# Patient Record
Sex: FEMALE | Race: WHITE | NOT HISPANIC OR LATINO | URBAN - METROPOLITAN AREA
[De-identification: names, ages, dates, MRNs, and addresses within clinical notes are randomized per-mention and may not be internally consistent; named-entity substitution may affect disease eponyms.]

---

## 2017-08-23 ENCOUNTER — OUTPATIENT (OUTPATIENT)
Dept: OUTPATIENT SERVICES | Facility: HOSPITAL | Age: 77
LOS: 1 days | Discharge: HOME | End: 2017-08-23

## 2017-08-23 DIAGNOSIS — H40.1120 PRIMARY OPEN-ANGLE GLAUCOMA, LEFT EYE, STAGE UNSPECIFIED: ICD-10-CM

## 2017-08-23 DIAGNOSIS — I10 ESSENTIAL (PRIMARY) HYPERTENSION: ICD-10-CM

## 2017-08-23 DIAGNOSIS — Z88.8 ALLERGY STATUS TO OTHER DRUGS, MEDICAMENTS AND BIOLOGICAL SUBSTANCES: ICD-10-CM

## 2017-08-23 DIAGNOSIS — H26.9 UNSPECIFIED CATARACT: ICD-10-CM

## 2017-08-23 DIAGNOSIS — E78.00 PURE HYPERCHOLESTEROLEMIA, UNSPECIFIED: ICD-10-CM

## 2017-08-23 DIAGNOSIS — Z88.0 ALLERGY STATUS TO PENICILLIN: ICD-10-CM

## 2018-06-14 PROBLEM — Z00.00 ENCOUNTER FOR PREVENTIVE HEALTH EXAMINATION: Status: ACTIVE | Noted: 2018-06-14

## 2018-06-22 ENCOUNTER — EMERGENCY (EMERGENCY)
Facility: HOSPITAL | Age: 78
LOS: 0 days | Discharge: HOME | End: 2018-06-22
Attending: EMERGENCY MEDICINE | Admitting: EMERGENCY MEDICINE

## 2018-06-22 VITALS
HEART RATE: 61 BPM | DIASTOLIC BLOOD PRESSURE: 73 MMHG | WEIGHT: 175.05 LBS | RESPIRATION RATE: 20 BRPM | SYSTOLIC BLOOD PRESSURE: 172 MMHG | OXYGEN SATURATION: 97 % | TEMPERATURE: 97 F

## 2018-06-22 DIAGNOSIS — M54.41 LUMBAGO WITH SCIATICA, RIGHT SIDE: ICD-10-CM

## 2018-06-22 DIAGNOSIS — E03.9 HYPOTHYROIDISM, UNSPECIFIED: ICD-10-CM

## 2018-06-22 DIAGNOSIS — Z95.1 PRESENCE OF AORTOCORONARY BYPASS GRAFT: ICD-10-CM

## 2018-06-22 DIAGNOSIS — Z91.018 ALLERGY TO OTHER FOODS: ICD-10-CM

## 2018-06-22 DIAGNOSIS — Z88.1 ALLERGY STATUS TO OTHER ANTIBIOTIC AGENTS STATUS: ICD-10-CM

## 2018-06-22 DIAGNOSIS — I25.10 ATHEROSCLEROTIC HEART DISEASE OF NATIVE CORONARY ARTERY WITHOUT ANGINA PECTORIS: ICD-10-CM

## 2018-06-22 DIAGNOSIS — M25.551 PAIN IN RIGHT HIP: ICD-10-CM

## 2018-06-22 RX ORDER — DEXAMETHASONE 0.5 MG/5ML
10 ELIXIR ORAL ONCE
Qty: 0 | Refills: 0 | Status: COMPLETED | OUTPATIENT
Start: 2018-06-22 | End: 2018-06-22

## 2018-06-22 RX ORDER — IBUPROFEN 200 MG
600 TABLET ORAL ONCE
Qty: 0 | Refills: 0 | Status: COMPLETED | OUTPATIENT
Start: 2018-06-22 | End: 2018-06-22

## 2018-06-22 RX ADMIN — Medication 600 MILLIGRAM(S): at 16:37

## 2018-06-22 RX ADMIN — Medication 10 MILLIGRAM(S): at 18:26

## 2018-06-22 NOTE — ED PROVIDER NOTE - OBJECTIVE STATEMENT
right low back pain radiating to right done right leg, Started 1 week ago while in exercise class, Felt sudden pain, With to chiropractor with some temporary relief. pain now getting  worse , radiating done leg,

## 2018-07-03 ENCOUNTER — APPOINTMENT (OUTPATIENT)
Dept: VASCULAR SURGERY | Facility: CLINIC | Age: 78
End: 2018-07-03
Payer: COMMERCIAL

## 2018-07-03 ENCOUNTER — OUTPATIENT (OUTPATIENT)
Dept: OUTPATIENT SERVICES | Facility: HOSPITAL | Age: 78
LOS: 1 days | Discharge: HOME | End: 2018-07-03

## 2018-07-03 VITALS
SYSTOLIC BLOOD PRESSURE: 120 MMHG | HEIGHT: 61 IN | DIASTOLIC BLOOD PRESSURE: 70 MMHG | BODY MASS INDEX: 30.21 KG/M2 | WEIGHT: 160 LBS

## 2018-07-03 DIAGNOSIS — Z86.39 PERSONAL HISTORY OF OTHER ENDOCRINE, NUTRITIONAL AND METABOLIC DISEASE: ICD-10-CM

## 2018-07-03 DIAGNOSIS — I65.23 OCCLUSION AND STENOSIS OF BILATERAL CAROTID ARTERIES: ICD-10-CM

## 2018-07-03 DIAGNOSIS — Z86.79 PERSONAL HISTORY OF OTHER DISEASES OF THE CIRCULATORY SYSTEM: ICD-10-CM

## 2018-07-03 PROCEDURE — 93880 EXTRACRANIAL BILAT STUDY: CPT

## 2018-07-03 PROCEDURE — 99203 OFFICE O/P NEW LOW 30 MIN: CPT

## 2018-07-15 ENCOUNTER — FORM ENCOUNTER (OUTPATIENT)
Age: 78
End: 2018-07-15

## 2018-07-16 ENCOUNTER — OTHER (OUTPATIENT)
Age: 78
End: 2018-07-16

## 2018-07-16 ENCOUNTER — OUTPATIENT (OUTPATIENT)
Dept: OUTPATIENT SERVICES | Facility: HOSPITAL | Age: 78
LOS: 1 days | Discharge: HOME | End: 2018-07-16

## 2018-07-16 DIAGNOSIS — I65.23 OCCLUSION AND STENOSIS OF BILATERAL CAROTID ARTERIES: ICD-10-CM

## 2018-07-16 LAB
BUN SERPL-MCNC: 20 MG/DL
CREAT SERPL-MCNC: 1 MG/DL

## 2018-07-18 PROBLEM — I25.10 ATHEROSCLEROTIC HEART DISEASE OF NATIVE CORONARY ARTERY WITHOUT ANGINA PECTORIS: Chronic | Status: ACTIVE | Noted: 2018-06-22

## 2018-07-21 ENCOUNTER — OUTPATIENT (OUTPATIENT)
Dept: OUTPATIENT SERVICES | Facility: HOSPITAL | Age: 78
LOS: 1 days | Discharge: HOME | End: 2018-07-21

## 2018-07-21 DIAGNOSIS — M54.31 SCIATICA, RIGHT SIDE: ICD-10-CM

## 2018-07-24 ENCOUNTER — APPOINTMENT (OUTPATIENT)
Dept: VASCULAR SURGERY | Facility: CLINIC | Age: 78
End: 2018-07-24
Payer: COMMERCIAL

## 2018-07-24 PROCEDURE — 99213 OFFICE O/P EST LOW 20 MIN: CPT

## 2018-07-31 ENCOUNTER — FORM ENCOUNTER (OUTPATIENT)
Age: 78
End: 2018-07-31

## 2018-08-01 ENCOUNTER — OUTPATIENT (OUTPATIENT)
Dept: OUTPATIENT SERVICES | Facility: HOSPITAL | Age: 78
LOS: 1 days | Discharge: HOME | End: 2018-08-01

## 2018-08-01 VITALS
HEIGHT: 61 IN | OXYGEN SATURATION: 98 % | DIASTOLIC BLOOD PRESSURE: 80 MMHG | SYSTOLIC BLOOD PRESSURE: 160 MMHG | RESPIRATION RATE: 20 BRPM | TEMPERATURE: 98 F | HEART RATE: 60 BPM | WEIGHT: 170.2 LBS

## 2018-08-01 DIAGNOSIS — I25.810 ATHEROSCLEROSIS OF CORONARY ARTERY BYPASS GRAFT(S) WITHOUT ANGINA PECTORIS: Chronic | ICD-10-CM

## 2018-08-01 DIAGNOSIS — Z96.641 PRESENCE OF RIGHT ARTIFICIAL HIP JOINT: Chronic | ICD-10-CM

## 2018-08-01 DIAGNOSIS — Z90.49 ACQUIRED ABSENCE OF OTHER SPECIFIED PARTS OF DIGESTIVE TRACT: Chronic | ICD-10-CM

## 2018-08-01 DIAGNOSIS — Z01.818 ENCOUNTER FOR OTHER PREPROCEDURAL EXAMINATION: ICD-10-CM

## 2018-08-01 DIAGNOSIS — I65.23 OCCLUSION AND STENOSIS OF BILATERAL CAROTID ARTERIES: ICD-10-CM

## 2018-08-01 DIAGNOSIS — E89.0 POSTPROCEDURAL HYPOTHYROIDISM: Chronic | ICD-10-CM

## 2018-08-01 LAB
ALBUMIN SERPL ELPH-MCNC: 4.5 G/DL — SIGNIFICANT CHANGE UP (ref 3.5–5.2)
ALP SERPL-CCNC: 69 U/L — SIGNIFICANT CHANGE UP (ref 30–115)
ALT FLD-CCNC: 13 U/L — SIGNIFICANT CHANGE UP (ref 0–41)
ANION GAP SERPL CALC-SCNC: 16 MMOL/L — HIGH (ref 7–14)
APPEARANCE UR: CLEAR — SIGNIFICANT CHANGE UP
APTT BLD: 34.2 SEC — SIGNIFICANT CHANGE UP (ref 27–39.2)
AST SERPL-CCNC: 18 U/L — SIGNIFICANT CHANGE UP (ref 0–41)
BASOPHILS # BLD AUTO: 0.06 K/UL — SIGNIFICANT CHANGE UP (ref 0–0.2)
BASOPHILS NFR BLD AUTO: 0.6 % — SIGNIFICANT CHANGE UP (ref 0–1)
BILIRUB SERPL-MCNC: 0.3 MG/DL — SIGNIFICANT CHANGE UP (ref 0.2–1.2)
BILIRUB UR-MCNC: NEGATIVE — SIGNIFICANT CHANGE UP
BLD GP AB SCN SERPL QL: SIGNIFICANT CHANGE UP
BUN SERPL-MCNC: 27 MG/DL — HIGH (ref 10–20)
CALCIUM SERPL-MCNC: 9.7 MG/DL — SIGNIFICANT CHANGE UP (ref 8.5–10.1)
CHLORIDE SERPL-SCNC: 98 MMOL/L — SIGNIFICANT CHANGE UP (ref 98–110)
CO2 SERPL-SCNC: 28 MMOL/L — SIGNIFICANT CHANGE UP (ref 17–32)
COLOR SPEC: YELLOW — SIGNIFICANT CHANGE UP
CREAT SERPL-MCNC: 1.1 MG/DL — SIGNIFICANT CHANGE UP (ref 0.7–1.5)
DIFF PNL FLD: ABNORMAL
EOSINOPHIL # BLD AUTO: 0.2 K/UL — SIGNIFICANT CHANGE UP (ref 0–0.7)
EOSINOPHIL NFR BLD AUTO: 2.1 % — SIGNIFICANT CHANGE UP (ref 0–8)
GLUCOSE SERPL-MCNC: 85 MG/DL — SIGNIFICANT CHANGE UP (ref 70–99)
GLUCOSE UR QL: NEGATIVE MG/DL — SIGNIFICANT CHANGE UP
HCT VFR BLD CALC: 35.6 % — LOW (ref 37–47)
HGB BLD-MCNC: 12 G/DL — SIGNIFICANT CHANGE UP (ref 12–16)
IMM GRANULOCYTES NFR BLD AUTO: 0.2 % — SIGNIFICANT CHANGE UP (ref 0.1–0.3)
INR BLD: 0.96 RATIO — SIGNIFICANT CHANGE UP (ref 0.65–1.3)
KETONES UR-MCNC: NEGATIVE — SIGNIFICANT CHANGE UP
LEUKOCYTE ESTERASE UR-ACNC: NEGATIVE — SIGNIFICANT CHANGE UP
LYMPHOCYTES # BLD AUTO: 2.81 K/UL — SIGNIFICANT CHANGE UP (ref 1.2–3.4)
LYMPHOCYTES # BLD AUTO: 29.6 % — SIGNIFICANT CHANGE UP (ref 20.5–51.1)
MCHC RBC-ENTMCNC: 29.9 PG — SIGNIFICANT CHANGE UP (ref 27–31)
MCHC RBC-ENTMCNC: 33.7 G/DL — SIGNIFICANT CHANGE UP (ref 32–37)
MCV RBC AUTO: 88.8 FL — SIGNIFICANT CHANGE UP (ref 81–99)
MONOCYTES # BLD AUTO: 0.56 K/UL — SIGNIFICANT CHANGE UP (ref 0.1–0.6)
MONOCYTES NFR BLD AUTO: 5.9 % — SIGNIFICANT CHANGE UP (ref 1.7–9.3)
NEUTROPHILS # BLD AUTO: 5.84 K/UL — SIGNIFICANT CHANGE UP (ref 1.4–6.5)
NEUTROPHILS NFR BLD AUTO: 61.6 % — SIGNIFICANT CHANGE UP (ref 42.2–75.2)
NITRITE UR-MCNC: NEGATIVE — SIGNIFICANT CHANGE UP
NRBC # BLD: 0 /100 WBCS — SIGNIFICANT CHANGE UP (ref 0–0)
PH UR: 6 — SIGNIFICANT CHANGE UP (ref 5–8)
PLATELET # BLD AUTO: 249 K/UL — SIGNIFICANT CHANGE UP (ref 130–400)
POTASSIUM SERPL-MCNC: 4.3 MMOL/L — SIGNIFICANT CHANGE UP (ref 3.5–5)
POTASSIUM SERPL-SCNC: 4.3 MMOL/L — SIGNIFICANT CHANGE UP (ref 3.5–5)
PROT SERPL-MCNC: 7 G/DL — SIGNIFICANT CHANGE UP (ref 6–8)
PROT UR-MCNC: NEGATIVE MG/DL — SIGNIFICANT CHANGE UP
PROTHROM AB SERPL-ACNC: 10.4 SEC — SIGNIFICANT CHANGE UP (ref 9.95–12.87)
RBC # BLD: 4.01 M/UL — LOW (ref 4.2–5.4)
RBC # FLD: 12.9 % — SIGNIFICANT CHANGE UP (ref 11.5–14.5)
SODIUM SERPL-SCNC: 142 MMOL/L — SIGNIFICANT CHANGE UP (ref 135–146)
SP GR SPEC: 1.02 — SIGNIFICANT CHANGE UP (ref 1.01–1.03)
TYPE + AB SCN PNL BLD: SIGNIFICANT CHANGE UP
UROBILINOGEN FLD QL: 0.2 MG/DL — SIGNIFICANT CHANGE UP (ref 0.2–0.2)
WBC # BLD: 9.49 K/UL — SIGNIFICANT CHANGE UP (ref 4.8–10.8)
WBC # FLD AUTO: 9.49 K/UL — SIGNIFICANT CHANGE UP (ref 4.8–10.8)
WBC UR QL: SIGNIFICANT CHANGE UP /HPF

## 2018-08-01 NOTE — H&P PST ADULT - PMH
CAD (coronary artery disease)    CAD (coronary artery disease) of bypass graft    Glaucoma    HTN (hypertension)    Hypothyroid CAD (coronary artery disease)    CAD (coronary artery disease) of bypass graft    Glaucoma    HTN (hypertension)    Hypothyroid    Macular degeneration

## 2018-08-01 NOTE — H&P PST ADULT - FAMILY HISTORY
Father  Still living? No  CAD (coronary artery disease) of bypass graft, Age at diagnosis: Age Unknown

## 2018-08-01 NOTE — H&P PST ADULT - PSH
CAD (coronary artery disease) of bypass graft  3v avr  History of hip replacement, total, right    S/P cholecystectomy    S/P partial thyroidectomy

## 2018-08-01 NOTE — H&P PST ADULT - HISTORY OF PRESENT ILLNESS
79 y/o female with h/o carotid stenosis scheduled for right cea with patch  reports no c/o cp,sob,palpitations,cough or dysuria  1-2 fos without sob

## 2018-08-06 ENCOUNTER — INPATIENT (INPATIENT)
Facility: HOSPITAL | Age: 78
LOS: 1 days | Discharge: HOME | End: 2018-08-08
Attending: SURGERY | Admitting: SURGERY
Payer: COMMERCIAL

## 2018-08-06 ENCOUNTER — APPOINTMENT (OUTPATIENT)
Dept: VASCULAR SURGERY | Facility: HOSPITAL | Age: 78
End: 2018-08-06
Payer: COMMERCIAL

## 2018-08-06 ENCOUNTER — RESULT REVIEW (OUTPATIENT)
Age: 78
End: 2018-08-06

## 2018-08-06 VITALS
HEIGHT: 61 IN | TEMPERATURE: 98 F | HEART RATE: 64 BPM | RESPIRATION RATE: 18 BRPM | WEIGHT: 160.94 LBS | DIASTOLIC BLOOD PRESSURE: 75 MMHG | SYSTOLIC BLOOD PRESSURE: 145 MMHG

## 2018-08-06 DIAGNOSIS — Z90.49 ACQUIRED ABSENCE OF OTHER SPECIFIED PARTS OF DIGESTIVE TRACT: Chronic | ICD-10-CM

## 2018-08-06 DIAGNOSIS — I25.810 ATHEROSCLEROSIS OF CORONARY ARTERY BYPASS GRAFT(S) WITHOUT ANGINA PECTORIS: Chronic | ICD-10-CM

## 2018-08-06 DIAGNOSIS — E89.0 POSTPROCEDURAL HYPOTHYROIDISM: Chronic | ICD-10-CM

## 2018-08-06 DIAGNOSIS — Z96.641 PRESENCE OF RIGHT ARTIFICIAL HIP JOINT: Chronic | ICD-10-CM

## 2018-08-06 DIAGNOSIS — I97.638 POSTPROCEDURAL HEMATOMA OF A CIRCULATORY SYSTEM ORGAN OR STRUCTURE FOLLOWING OTHER CIRCULATORY SYSTEM PROCEDURE: ICD-10-CM

## 2018-08-06 LAB
ANION GAP SERPL CALC-SCNC: 12 MMOL/L — SIGNIFICANT CHANGE UP (ref 7–14)
ANION GAP SERPL CALC-SCNC: 16 MMOL/L — HIGH (ref 7–14)
APTT BLD: >200 SEC — CRITICAL HIGH (ref 27–39.2)
BASOPHILS # BLD AUTO: 0.03 K/UL — SIGNIFICANT CHANGE UP (ref 0–0.2)
BASOPHILS NFR BLD AUTO: 0.4 % — SIGNIFICANT CHANGE UP (ref 0–1)
BUN SERPL-MCNC: 19 MG/DL — SIGNIFICANT CHANGE UP (ref 10–20)
BUN SERPL-MCNC: 20 MG/DL — SIGNIFICANT CHANGE UP (ref 10–20)
CALCIUM SERPL-MCNC: 8.4 MG/DL — LOW (ref 8.5–10.1)
CALCIUM SERPL-MCNC: 8.5 MG/DL — SIGNIFICANT CHANGE UP (ref 8.5–10.1)
CHLORIDE SERPL-SCNC: 102 MMOL/L — SIGNIFICANT CHANGE UP (ref 98–110)
CHLORIDE SERPL-SCNC: 97 MMOL/L — LOW (ref 98–110)
CK SERPL-CCNC: 74 U/L — SIGNIFICANT CHANGE UP (ref 0–225)
CO2 SERPL-SCNC: 25 MMOL/L — SIGNIFICANT CHANGE UP (ref 17–32)
CO2 SERPL-SCNC: 25 MMOL/L — SIGNIFICANT CHANGE UP (ref 17–32)
CREAT SERPL-MCNC: 0.9 MG/DL — SIGNIFICANT CHANGE UP (ref 0.7–1.5)
CREAT SERPL-MCNC: 0.9 MG/DL — SIGNIFICANT CHANGE UP (ref 0.7–1.5)
EOSINOPHIL # BLD AUTO: 0.2 K/UL — SIGNIFICANT CHANGE UP (ref 0–0.7)
EOSINOPHIL NFR BLD AUTO: 2.8 % — SIGNIFICANT CHANGE UP (ref 0–8)
GLUCOSE SERPL-MCNC: 113 MG/DL — HIGH (ref 70–99)
GLUCOSE SERPL-MCNC: 98 MG/DL — SIGNIFICANT CHANGE UP (ref 70–99)
HCT VFR BLD CALC: 29.9 % — LOW (ref 37–47)
HCT VFR BLD CALC: 30.2 % — LOW (ref 37–47)
HGB BLD-MCNC: 10.4 G/DL — LOW (ref 12–16)
HGB BLD-MCNC: 10.4 G/DL — LOW (ref 12–16)
IMM GRANULOCYTES NFR BLD AUTO: 0.3 % — SIGNIFICANT CHANGE UP (ref 0.1–0.3)
INR BLD: 1.15 RATIO — SIGNIFICANT CHANGE UP (ref 0.65–1.3)
LYMPHOCYTES # BLD AUTO: 2.73 K/UL — SIGNIFICANT CHANGE UP (ref 1.2–3.4)
LYMPHOCYTES # BLD AUTO: 37.6 % — SIGNIFICANT CHANGE UP (ref 20.5–51.1)
MAGNESIUM SERPL-MCNC: 1.9 MG/DL — SIGNIFICANT CHANGE UP (ref 1.8–2.4)
MAGNESIUM SERPL-MCNC: 2 MG/DL — SIGNIFICANT CHANGE UP (ref 1.8–2.4)
MCHC RBC-ENTMCNC: 30.2 PG — SIGNIFICANT CHANGE UP (ref 27–31)
MCHC RBC-ENTMCNC: 30.3 PG — SIGNIFICANT CHANGE UP (ref 27–31)
MCHC RBC-ENTMCNC: 34.4 G/DL — SIGNIFICANT CHANGE UP (ref 32–37)
MCHC RBC-ENTMCNC: 34.8 G/DL — SIGNIFICANT CHANGE UP (ref 32–37)
MCV RBC AUTO: 87.2 FL — SIGNIFICANT CHANGE UP (ref 81–99)
MCV RBC AUTO: 87.8 FL — SIGNIFICANT CHANGE UP (ref 81–99)
MONOCYTES # BLD AUTO: 0.44 K/UL — SIGNIFICANT CHANGE UP (ref 0.1–0.6)
MONOCYTES NFR BLD AUTO: 6.1 % — SIGNIFICANT CHANGE UP (ref 1.7–9.3)
NEUTROPHILS # BLD AUTO: 3.84 K/UL — SIGNIFICANT CHANGE UP (ref 1.4–6.5)
NEUTROPHILS NFR BLD AUTO: 52.8 % — SIGNIFICANT CHANGE UP (ref 42.2–75.2)
NRBC # BLD: 0 /100 WBCS — SIGNIFICANT CHANGE UP (ref 0–0)
PLATELET # BLD AUTO: 193 K/UL — SIGNIFICANT CHANGE UP (ref 130–400)
PLATELET # BLD AUTO: 199 K/UL — SIGNIFICANT CHANGE UP (ref 130–400)
POTASSIUM SERPL-MCNC: 3.4 MMOL/L — LOW (ref 3.5–5)
POTASSIUM SERPL-MCNC: 4.3 MMOL/L — SIGNIFICANT CHANGE UP (ref 3.5–5)
POTASSIUM SERPL-SCNC: 3.4 MMOL/L — LOW (ref 3.5–5)
POTASSIUM SERPL-SCNC: 4.3 MMOL/L — SIGNIFICANT CHANGE UP (ref 3.5–5)
PROTHROM AB SERPL-ACNC: 12.4 SEC — SIGNIFICANT CHANGE UP (ref 9.95–12.87)
RBC # BLD: 3.43 M/UL — LOW (ref 4.2–5.4)
RBC # BLD: 3.44 M/UL — LOW (ref 4.2–5.4)
RBC # FLD: 13.2 % — SIGNIFICANT CHANGE UP (ref 11.5–14.5)
RBC # FLD: 13.3 % — SIGNIFICANT CHANGE UP (ref 11.5–14.5)
SODIUM SERPL-SCNC: 138 MMOL/L — SIGNIFICANT CHANGE UP (ref 135–146)
SODIUM SERPL-SCNC: 139 MMOL/L — SIGNIFICANT CHANGE UP (ref 135–146)
TROPONIN T SERPL-MCNC: <0.01 NG/ML — SIGNIFICANT CHANGE UP
WBC # BLD: 10.22 K/UL — SIGNIFICANT CHANGE UP (ref 4.8–10.8)
WBC # BLD: 7.26 K/UL — SIGNIFICANT CHANGE UP (ref 4.8–10.8)
WBC # FLD AUTO: 10.22 K/UL — SIGNIFICANT CHANGE UP (ref 4.8–10.8)
WBC # FLD AUTO: 7.26 K/UL — SIGNIFICANT CHANGE UP (ref 4.8–10.8)

## 2018-08-06 PROCEDURE — 99233 SBSQ HOSP IP/OBS HIGH 50: CPT

## 2018-08-06 PROCEDURE — 35301 RECHANNELING OF ARTERY: CPT | Mod: RT

## 2018-08-06 PROCEDURE — 35800 EXPLORE NECK VESSELS: CPT | Mod: 78,RT

## 2018-08-06 RX ORDER — LOSARTAN POTASSIUM 100 MG/1
100 TABLET, FILM COATED ORAL DAILY
Qty: 0 | Refills: 0 | Status: DISCONTINUED | OUTPATIENT
Start: 2018-08-06 | End: 2018-08-06

## 2018-08-06 RX ORDER — ATORVASTATIN CALCIUM 80 MG/1
80 TABLET, FILM COATED ORAL AT BEDTIME
Qty: 0 | Refills: 0 | Status: DISCONTINUED | OUTPATIENT
Start: 2018-08-06 | End: 2018-08-06

## 2018-08-06 RX ORDER — BRIMONIDINE TARTRATE 2 MG/MG
1 SOLUTION/ DROPS OPHTHALMIC THREE TIMES A DAY
Qty: 0 | Refills: 0 | Status: DISCONTINUED | OUTPATIENT
Start: 2018-08-06 | End: 2018-08-06

## 2018-08-06 RX ORDER — ACETAMINOPHEN 500 MG
650 TABLET ORAL EVERY 4 HOURS
Qty: 0 | Refills: 0 | Status: DISCONTINUED | OUTPATIENT
Start: 2018-08-06 | End: 2018-08-06

## 2018-08-06 RX ORDER — HYDROMORPHONE HYDROCHLORIDE 2 MG/ML
1 INJECTION INTRAMUSCULAR; INTRAVENOUS; SUBCUTANEOUS
Qty: 0 | Refills: 0 | Status: DISCONTINUED | OUTPATIENT
Start: 2018-08-06 | End: 2018-08-06

## 2018-08-06 RX ORDER — SENNA PLUS 8.6 MG/1
2 TABLET ORAL AT BEDTIME
Qty: 0 | Refills: 0 | Status: DISCONTINUED | OUTPATIENT
Start: 2018-08-06 | End: 2018-08-08

## 2018-08-06 RX ORDER — SODIUM CHLORIDE 9 MG/ML
1000 INJECTION, SOLUTION INTRAVENOUS
Qty: 0 | Refills: 0 | Status: DISCONTINUED | OUTPATIENT
Start: 2018-08-06 | End: 2018-08-06

## 2018-08-06 RX ORDER — OXYCODONE HYDROCHLORIDE 5 MG/1
5 TABLET ORAL EVERY 4 HOURS
Qty: 0 | Refills: 0 | Status: DISCONTINUED | OUTPATIENT
Start: 2018-08-06 | End: 2018-08-08

## 2018-08-06 RX ORDER — ONDANSETRON 8 MG/1
4 TABLET, FILM COATED ORAL ONCE
Qty: 0 | Refills: 0 | Status: DISCONTINUED | OUTPATIENT
Start: 2018-08-06 | End: 2018-08-06

## 2018-08-06 RX ORDER — LATANOPROST 0.05 MG/ML
1 SOLUTION/ DROPS OPHTHALMIC; TOPICAL AT BEDTIME
Qty: 0 | Refills: 0 | Status: DISCONTINUED | OUTPATIENT
Start: 2018-08-06 | End: 2018-08-06

## 2018-08-06 RX ORDER — OXYCODONE HYDROCHLORIDE 5 MG/1
10 TABLET ORAL EVERY 6 HOURS
Qty: 0 | Refills: 0 | Status: DISCONTINUED | OUTPATIENT
Start: 2018-08-06 | End: 2018-08-06

## 2018-08-06 RX ORDER — OXYCODONE HYDROCHLORIDE 5 MG/1
10 TABLET ORAL EVERY 6 HOURS
Qty: 0 | Refills: 0 | Status: DISCONTINUED | OUTPATIENT
Start: 2018-08-06 | End: 2018-08-08

## 2018-08-06 RX ORDER — ASPIRIN/CALCIUM CARB/MAGNESIUM 324 MG
81 TABLET ORAL DAILY
Qty: 0 | Refills: 0 | Status: DISCONTINUED | OUTPATIENT
Start: 2018-08-06 | End: 2018-08-06

## 2018-08-06 RX ORDER — METOPROLOL TARTRATE 50 MG
5 TABLET ORAL EVERY 4 HOURS
Qty: 0 | Refills: 0 | Status: DISCONTINUED | OUTPATIENT
Start: 2018-08-06 | End: 2018-08-06

## 2018-08-06 RX ORDER — ASPIRIN/CALCIUM CARB/MAGNESIUM 324 MG
81 TABLET ORAL DAILY
Qty: 0 | Refills: 0 | Status: DISCONTINUED | OUTPATIENT
Start: 2018-08-08 | End: 2018-08-08

## 2018-08-06 RX ORDER — METOPROLOL TARTRATE 50 MG
25 TABLET ORAL DAILY
Qty: 0 | Refills: 0 | Status: DISCONTINUED | OUTPATIENT
Start: 2018-08-06 | End: 2018-08-06

## 2018-08-06 RX ORDER — MEPERIDINE HYDROCHLORIDE 50 MG/ML
12.5 INJECTION INTRAMUSCULAR; INTRAVENOUS; SUBCUTANEOUS
Qty: 0 | Refills: 0 | Status: DISCONTINUED | OUTPATIENT
Start: 2018-08-06 | End: 2018-08-07

## 2018-08-06 RX ORDER — LEVOTHYROXINE SODIUM 125 MCG
125 TABLET ORAL DAILY
Qty: 0 | Refills: 0 | Status: DISCONTINUED | OUTPATIENT
Start: 2018-08-06 | End: 2018-08-08

## 2018-08-06 RX ORDER — SODIUM CHLORIDE 9 MG/ML
1000 INJECTION, SOLUTION INTRAVENOUS
Qty: 0 | Refills: 0 | Status: DISCONTINUED | OUTPATIENT
Start: 2018-08-06 | End: 2018-08-07

## 2018-08-06 RX ORDER — METOPROLOL TARTRATE 50 MG
25 TABLET ORAL DAILY
Qty: 0 | Refills: 0 | Status: DISCONTINUED | OUTPATIENT
Start: 2018-08-06 | End: 2018-08-08

## 2018-08-06 RX ORDER — LATANOPROST 0.05 MG/ML
1 SOLUTION/ DROPS OPHTHALMIC; TOPICAL AT BEDTIME
Qty: 0 | Refills: 0 | Status: DISCONTINUED | OUTPATIENT
Start: 2018-08-06 | End: 2018-08-08

## 2018-08-06 RX ORDER — LEVOTHYROXINE SODIUM 125 MCG
125 TABLET ORAL DAILY
Qty: 0 | Refills: 0 | Status: DISCONTINUED | OUTPATIENT
Start: 2018-08-06 | End: 2018-08-06

## 2018-08-06 RX ORDER — BRIMONIDINE TARTRATE 2 MG/MG
1 SOLUTION/ DROPS OPHTHALMIC THREE TIMES A DAY
Qty: 0 | Refills: 0 | Status: DISCONTINUED | OUTPATIENT
Start: 2018-08-06 | End: 2018-08-08

## 2018-08-06 RX ORDER — HYDROMORPHONE HYDROCHLORIDE 2 MG/ML
0.5 INJECTION INTRAMUSCULAR; INTRAVENOUS; SUBCUTANEOUS
Qty: 0 | Refills: 0 | Status: DISCONTINUED | OUTPATIENT
Start: 2018-08-06 | End: 2018-08-07

## 2018-08-06 RX ORDER — SENNA PLUS 8.6 MG/1
2 TABLET ORAL AT BEDTIME
Qty: 0 | Refills: 0 | Status: DISCONTINUED | OUTPATIENT
Start: 2018-08-06 | End: 2018-08-06

## 2018-08-06 RX ORDER — LOSARTAN POTASSIUM 100 MG/1
100 TABLET, FILM COATED ORAL DAILY
Qty: 0 | Refills: 0 | Status: DISCONTINUED | OUTPATIENT
Start: 2018-08-06 | End: 2018-08-08

## 2018-08-06 RX ORDER — ACETAMINOPHEN 500 MG
650 TABLET ORAL EVERY 4 HOURS
Qty: 0 | Refills: 0 | Status: DISCONTINUED | OUTPATIENT
Start: 2018-08-06 | End: 2018-08-08

## 2018-08-06 RX ORDER — ONDANSETRON 8 MG/1
4 TABLET, FILM COATED ORAL EVERY 8 HOURS
Qty: 0 | Refills: 0 | Status: DISCONTINUED | OUTPATIENT
Start: 2018-08-06 | End: 2018-08-07

## 2018-08-06 RX ORDER — ATORVASTATIN CALCIUM 80 MG/1
80 TABLET, FILM COATED ORAL AT BEDTIME
Qty: 0 | Refills: 0 | Status: DISCONTINUED | OUTPATIENT
Start: 2018-08-06 | End: 2018-08-08

## 2018-08-06 RX ORDER — HYDROMORPHONE HYDROCHLORIDE 2 MG/ML
1 INJECTION INTRAMUSCULAR; INTRAVENOUS; SUBCUTANEOUS
Qty: 0 | Refills: 0 | Status: DISCONTINUED | OUTPATIENT
Start: 2018-08-06 | End: 2018-08-07

## 2018-08-06 RX ORDER — HYDROMORPHONE HYDROCHLORIDE 2 MG/ML
0.5 INJECTION INTRAMUSCULAR; INTRAVENOUS; SUBCUTANEOUS
Qty: 0 | Refills: 0 | Status: DISCONTINUED | OUTPATIENT
Start: 2018-08-06 | End: 2018-08-06

## 2018-08-06 RX ORDER — OXYCODONE HYDROCHLORIDE 5 MG/1
5 TABLET ORAL EVERY 4 HOURS
Qty: 0 | Refills: 0 | Status: DISCONTINUED | OUTPATIENT
Start: 2018-08-06 | End: 2018-08-06

## 2018-08-06 RX ORDER — METOPROLOL TARTRATE 50 MG
5 TABLET ORAL EVERY 4 HOURS
Qty: 0 | Refills: 0 | Status: DISCONTINUED | OUTPATIENT
Start: 2018-08-06 | End: 2018-08-08

## 2018-08-06 RX ADMIN — ATORVASTATIN CALCIUM 80 MILLIGRAM(S): 80 TABLET, FILM COATED ORAL at 21:08

## 2018-08-06 RX ADMIN — LATANOPROST 1 DROP(S): 0.05 SOLUTION/ DROPS OPHTHALMIC; TOPICAL at 21:11

## 2018-08-06 RX ADMIN — BRIMONIDINE TARTRATE 1 DROP(S): 2 SOLUTION/ DROPS OPHTHALMIC at 22:03

## 2018-08-06 RX ADMIN — SENNA PLUS 2 TABLET(S): 8.6 TABLET ORAL at 21:08

## 2018-08-06 RX ADMIN — HYDROMORPHONE HYDROCHLORIDE 0.5 MILLIGRAM(S): 2 INJECTION INTRAMUSCULAR; INTRAVENOUS; SUBCUTANEOUS at 20:38

## 2018-08-06 RX ADMIN — HYDROMORPHONE HYDROCHLORIDE 0.5 MILLIGRAM(S): 2 INJECTION INTRAMUSCULAR; INTRAVENOUS; SUBCUTANEOUS at 14:40

## 2018-08-06 RX ADMIN — SODIUM CHLORIDE 100 MILLILITER(S): 9 INJECTION, SOLUTION INTRAVENOUS at 13:53

## 2018-08-06 RX ADMIN — SODIUM CHLORIDE 100 MILLILITER(S): 9 INJECTION, SOLUTION INTRAVENOUS at 20:15

## 2018-08-06 RX ADMIN — HYDROMORPHONE HYDROCHLORIDE 0.5 MILLIGRAM(S): 2 INJECTION INTRAMUSCULAR; INTRAVENOUS; SUBCUTANEOUS at 15:12

## 2018-08-06 RX ADMIN — HYDROMORPHONE HYDROCHLORIDE 0.5 MILLIGRAM(S): 2 INJECTION INTRAMUSCULAR; INTRAVENOUS; SUBCUTANEOUS at 20:55

## 2018-08-06 RX ADMIN — SODIUM CHLORIDE 100 MILLILITER(S): 9 INJECTION, SOLUTION INTRAVENOUS at 15:43

## 2018-08-06 RX ADMIN — SODIUM CHLORIDE 100 MILLILITER(S): 9 INJECTION, SOLUTION INTRAVENOUS at 19:23

## 2018-08-06 NOTE — CONSULT NOTE ADULT - ASSESSMENT
ASSESSMENT:  78y Female with history of symptomatic b/l carotid stenosis R>L s/p right CEA. Patient tolerated procedure well    PLAN:   Neurologic: Continue to neurovascualr checks, pain control prn. Patient complaining of tooth pain, likely secondary to nerve retraction as per primary team, will continue to monitor.   Respiratory: Sating well on nasal cannula post operatively, no previous respiratory diagnoses, wean to room air as tolerated   Cardiovascular: Currently normotensive in sinus rhythm. systolic BP goal of 100-150, begin ASA and home meds tonight. hold HSQ   Gastrointestinal/Nutrition: Clear liquid diet for evening, advance in am  Renal/Genitourinary: No scott during case, monitor urine output and electrolyte status, replete prn  Hematologic: Holding HSQ, minimal EBL intraop but oozing at site post operatively, will continue to monitor and trend H&H  Infectious Disease: no active diagnosis, WNL  Lines/Tubes: left radial marcelino, b/l peripheral ivs  Endocrine: begin home synthroid  Disposition: ICU      Critical Care Diagnoses: S/P right CEA for b/l carotid stenosis ASSESSMENT:  78y Female with history of b/l carotid stenosis R>L s/p right CEA. Patient tolerated procedure well    PLAN:   Neurologic: Continue to neurovascualr checks, pain control prn. Patient complaining of tooth pain, likely secondary to nerve retraction as per primary team, will continue to monitor.   Respiratory: Sating well on nasal cannula post operatively, no previous respiratory diagnoses, wean to room air as tolerated   Cardiovascular: Currently normotensive in sinus rhythm. systolic BP goal of 100-150, begin ASA and home meds tonight. hold HSQ   Gastrointestinal/Nutrition: Clear liquid diet for evening, advance in am  Renal/Genitourinary: No scott during case, monitor urine output and electrolyte status, replete prn  Hematologic: Holding HSQ, minimal EBL intraop but oozing at site post operatively, will continue to monitor and trend H&H  Infectious Disease: no active diagnosis, WNL  Lines/Tubes: left radial marcelino, b/l peripheral ivs  Endocrine: begin home synthroid  Disposition: ICU      Critical Care Diagnoses: S/P right CEA for b/l carotid stenosis

## 2018-08-06 NOTE — BRIEF OPERATIVE NOTE - PROCEDURE
<<-----Click on this checkbox to enter Procedure Carotid endarterectomy  08/06/2018    Active  DAYDAY

## 2018-08-06 NOTE — CHART NOTE - NSCHARTNOTEFT_GEN_A_CORE
PACU ANESTHESIA ADMISSION NOTE      Procedure: Carotid endarterectomy    Post op diagnosis:  Carotid stenosis, asymptomatic, right      ____  Intubated  TV:______       Rate: ______      FiO2: ______    _x___  Patent Airway    _x___  Full return of protective reflexes    _x___  Full recovery from anesthesia / back to baseline status    Vitals  SPO2:-99% on 3 lnc  HR:-69  RR:-12  B.P:-154/60  TEMP:-97.7    Mental Status:  _x___ Awake   ___x_ Alert   _____ Drowsy   _____ Sedated    Nausea/Vomiting:  _x___  NO       ______Yes,   See Post - Op Orders         Pain Scale (0-10):  __0___    Treatment: _x___ None    __x__ See Post - Op/PCA Orders    Post - Operative Fluids:   ___ Oral   ____x See Post - Op Orders    Plan: Discharge:   ____Home       ____Floor     ___x__Critical Care    _____  Other:_________________    Comments:  Report endorsed to RN in PACU  Vitals stable  No anesthesia issues or complications noted.  Discharge to patient to ICU when criteria met.  ICU report endorsed to  ([id number])

## 2018-08-06 NOTE — CHART NOTE - NSCHARTNOTEFT_GEN_A_CORE
78y Female    for exploration of carotid wound post CEA today, c/o throat swelling without breathing issue    erythromycin (Rash)  latex (Rash)  parsley (Headache)  penicillins (Rash)      HPI:      PAST MEDICAL & SURGICAL HISTORY:  Macular degeneration  CAD (coronary artery disease) of bypass graft  Hypothyroid  HTN (hypertension)  Glaucoma  CAD (coronary artery disease)  History of hip replacement, total, right  S/P partial thyroidectomy  S/P cholecystectomy  CAD (coronary artery disease) of bypass graft: 3v avr        MEDICATIONS  (STANDING):  aspirin enteric coated 81 milliGRAM(s) Oral daily  atorvastatin 80 milliGRAM(s) Oral at bedtime  brimonidine 0.2% Ophthalmic Solution 1 Drop(s) Both EYES three times a day  lactated ringers. 1000 milliLiter(s) (100 mL/Hr) IV Continuous <Continuous>  lactated ringers. 1000 milliLiter(s) (100 mL/Hr) IV Continuous <Continuous>  latanoprost 0.005% Ophthalmic Solution 1 Drop(s) Both EYES at bedtime  levothyroxine 125 MICROGram(s) Oral daily  losartan 100 milliGRAM(s) Oral daily  metoprolol succinate ER 25 milliGRAM(s) Oral daily  senna 2 Tablet(s) Oral at bedtime    MEDICATIONS  (PRN):  acetaminophen   Tablet. 650 milliGRAM(s) Oral every 4 hours PRN Mild Pain (1 - 3)  HYDROmorphone  Injectable 0.5 milliGRAM(s) IV Push every 10 minutes PRN Moderate Pain (4 - 6)  HYDROmorphone  Injectable 1 milliGRAM(s) IV Push every 10 minutes PRN Severe Pain (7 - 10)  metoprolol tartrate Injectable 5 milliGRAM(s) IV Push every 4 hours PRN SBP > 150 mmHg  ondansetron Injectable 4 milliGRAM(s) IV Push once PRN Nausea and/or Vomiting  oxyCODONE    IR 5 milliGRAM(s) Oral every 4 hours PRN Mild Pain (1 - 3)  oxyCODONE    IR 10 milliGRAM(s) Oral every 6 hours PRN Severe Pain (7 - 10)    Home Medications:  Alphagan 0.2% ophthalmic solution: 1 drop(s) to each affected eye 3 times a day (06 Aug 2018 09:40)  aspirin 81 mg oral tablet: 1 tab(s) orally once a day (06 Aug 2018 09:40)  Crestor 20 mg oral tablet: 1 tab(s) orally once a day (at bedtime) (06 Aug 2018 09:40)  levothyroxine 125 mcg (0.125 mg) oral tablet: 1 tab(s) orally once a day (06 Aug 2018 09:40)  metoprolol succinate 25 mg oral tablet, extended release: 1 tab(s) orally once a day (06 Aug 2018 09:40)  valsartan 160 mg oral tablet: 1 tab(s) orally once a day (06 Aug 2018 09:40)  Xalatan 0.005% ophthalmic solution: 1 drop(s) to each affected eye once a day (in the evening) (06 Aug 2018 09:40)      Allergies    erythromycin (Rash)  latex (Rash)  parsley (Headache)  penicillins (Rash)    Intolerances        SOCIAL HISTORY: reviewed    FAMILY HISTORY:  CAD (coronary artery disease) of bypass graft (Father)      Vital Signs Last 24 Hrs  T(C): 36.4 (06 Aug 2018 17:00), Max: 36.8 (06 Aug 2018 13:52)  T(F): 97.5 (06 Aug 2018 17:00), Max: 98.2 (06 Aug 2018 13:52)  HR: 56 (06 Aug 2018 17:00) (56 - 92)  BP: 122/60 (06 Aug 2018 17:00) (122/60 - 154/60)  BP(mean): --  RR: 19 (06 Aug 2018 17:00) (12 - 22)  SpO2: 100% (06 Aug 2018 17:00) (97% - 100%)    NPO: ____ Yes  __x__ No    Exam:  Drug Dosing Weight  Height (cm): 154.94 (06 Aug 2018 10:45)  Weight (kg): 73 (06 Aug 2018 10:45)  BMI (kg/m2): 30.4 (06 Aug 2018 10:45)  BSA (m2): 1.72 (06 Aug 2018 10:45)    MP: II  Teeth: Intact  Mouth opening:     LABS:                        10.4   7.26  )-----------( 199      ( 06 Aug 2018 14:10 )             29.9                         12.0   9.49  )-----------( 249      ( 01 Aug 2018 19:04 )             35.6     CARDIAC MARKERS ( 06 Aug 2018 14:10 )  x     / <0.01 ng/mL / 74 U/L / x     / x              139  |  102  |  20  ----------------------------<  98  3.4<L>   |  25  |  0.9      142  |  98  |  27<H>  ----------------------------<  85  4.3   |  28  |  1.1    Ca    8.5      06 Aug 2018 14:10  Ca    9.7      01 Aug 2018 19:04  Mg     2.0         TPro  7.0  /  Alb  4.5  /  TBili  0.3  /  DBili  x   /  AST  18  /  ALT  13  /  AlkPhos  69      PT/INR - ( 06 Aug 2018 14:10 )   PT: 12.40 sec;   INR: 1.15 ratio         PTT - ( 06 Aug 2018 14:10 )  PTT:>200.0 sec  Urinalysis Basic - ( 01 Aug 2018 19:04 )    Color: Yellow / Appearance: Clear / S.020 / pH: x  Gluc: x / Ketone: Negative  / Bili: Negative / Urobili: 0.2 mg/dL   Blood: x / Protein: Negative mg/dL / Nitrite: Negative   Leuk Esterase: Negative / RBC: x / WBC 1-2 /HPF   Sq Epi: x / Non Sq Epi: x / Bacteria: x        RADIOLOGY & ADDITIONAL STUDIES:      ASA _IIIE___,  R/B/A discussed with: __x_ patient, ____ guardian, Understand and Accepts,  Question Answered.  Level emergency, note completed intra op

## 2018-08-06 NOTE — BRIEF OPERATIVE NOTE - PROCEDURE
<<-----Click on this checkbox to enter Procedure Neck hematoma drainage  08/06/2018    Active  DAYDAY

## 2018-08-06 NOTE — PRE-ANESTHESIA EVALUATION ADULT - NSANTHOSAYNRD_GEN_A_CORE
info provided/No. DEXTER screening performed.  STOP BANG Legend: 0-2 = LOW Risk; 3-4 = INTERMEDIATE Risk; 5-8 = HIGH Risk

## 2018-08-06 NOTE — ASU PATIENT PROFILE, ADULT - PMH
CAD (coronary artery disease)    CAD (coronary artery disease) of bypass graft    Glaucoma    HTN (hypertension)    Hypothyroid    Macular degeneration

## 2018-08-06 NOTE — CONSULT NOTE ADULT - SUBJECTIVE AND OBJECTIVE BOX
SICU Consultation Note  =====================================================  HPI: 78y Female with PMH below notable for history of symptomatic carotid stenosis, imaging performed revealed bilateral disease with greater than 70% occlusion b/l with R>L disease. Patient presented today for right sided CEA. Tolerated procedure well and is stable post operatively.    Surgery Information  OR time:  1.5h    EBL: 50         IV Fluids: 1600      Blood Products: none  UOP: NR - no sonia          PAST MEDICAL & SURGICAL HISTORY:  Macular degeneration  CAD (coronary artery disease) of bypass graft  Hypothyroid  HTN (hypertension)  Glaucoma  CAD (coronary artery disease)  History of hip replacement, total, right  S/P partial thyroidectomy  S/P cholecystectomy  CAD (coronary artery disease) of bypass graft: 3v avr    Home Meds: Home Medications:  Alphagan 0.2% ophthalmic solution: 1 drop(s) to each affected eye 3 times a day (06 Aug 2018 09:40)  aspirin 81 mg oral tablet: 1 tab(s) orally once a day (06 Aug 2018 09:40)  Crestor 20 mg oral tablet: 1 tab(s) orally once a day (at bedtime) (06 Aug 2018 09:40)  levothyroxine 125 mcg (0.125 mg) oral tablet: 1 tab(s) orally once a day (06 Aug 2018 09:40)  metoprolol succinate 25 mg oral tablet, extended release: 1 tab(s) orally once a day (06 Aug 2018 09:40)  valsartan 160 mg oral tablet: 1 tab(s) orally once a day (06 Aug 2018 09:40)  Xalatan 0.005% ophthalmic solution: 1 drop(s) to each affected eye once a day (in the evening) (06 Aug 2018 09:40)    Allergies: Allergies    erythromycin (Rash)  latex (Rash)  parsley (Headache)  penicillins (Rash)        Soc:   Advanced Directives: Presumed Full Code     ROS:    REVIEW OF SYSTEMS    [X] A ten-point review of systems was otherwise negative except as noted.  [ ] Due to altered mental status/intubation, subjective information were not able to be obtained from the patient. History was obtained, to the extent possible, from review of the chart and collateral sources of information.      CURRENT MEDICATIONS:   --------------------------------------------------------------------------------------  Neurologic Medications  acetaminophen   Tablet. 650 milliGRAM(s) Oral every 4 hours PRN Mild Pain (1 - 3)  HYDROmorphone  Injectable 0.5 milliGRAM(s) IV Push every 10 minutes PRN Moderate Pain (4 - 6)  HYDROmorphone  Injectable 1 milliGRAM(s) IV Push every 10 minutes PRN Severe Pain (7 - 10)  ondansetron Injectable 4 milliGRAM(s) IV Push once PRN Nausea and/or Vomiting  oxyCODONE    IR 5 milliGRAM(s) Oral every 4 hours PRN Mild Pain (1 - 3)  oxyCODONE    IR 10 milliGRAM(s) Oral every 6 hours PRN Severe Pain (7 - 10)    Respiratory Medications    Cardiovascular Medications  losartan 100 milliGRAM(s) Oral daily  metoprolol succinate ER 25 milliGRAM(s) Oral daily  metoprolol tartrate Injectable 5 milliGRAM(s) IV Push every 4 hours PRN SBP > 150 mmHg    Gastrointestinal Medications  lactated ringers. 1000 milliLiter(s) IV Continuous <Continuous>  senna 2 Tablet(s) Oral at bedtime    Genitourinary Medications    Hematologic/Oncologic Medications  aspirin enteric coated 81 milliGRAM(s) Oral daily    Antimicrobial/Immunologic Medications    Endocrine/Metabolic Medications  atorvastatin 80 milliGRAM(s) Oral at bedtime  levothyroxine 125 MICROGram(s) Oral daily    Topical/Other Medications  brimonidine 0.2% Ophthalmic Solution 1 Drop(s) Both EYES three times a day  latanoprost 0.005% Ophthalmic Solution 1 Drop(s) Both EYES at bedtime    --------------------------------------------------------------------------------------    VITAL SIGNS, INS/OUTS (last 24 hours):  --------------------------------------------------------------------------------------  ICU Vital Signs Last 24 Hrs  T(C): 36.8 (06 Aug 2018 13:52), Max: 36.8 (06 Aug 2018 13:52)  T(F): 98.2 (06 Aug 2018 13:52), Max: 98.2 (06 Aug 2018 13:52)  HR: 77 (06 Aug 2018 14:22) (64 - 92)  BP: 125/80 (06 Aug 2018 14:22) (125/80 - 154/60)  BP(mean): --  ABP: 148/57 (06 Aug 2018 14:22) (148/57 - 166/60)  ABP(mean): --  RR: 22 (06 Aug 2018 14:22) (15 - 22)  SpO2: 97% (06 Aug 2018 14:22) (97% - 100%)    I&O's Summary    --------------------------------------------------------------------------------------    EXAM:  General/Neuro NAD, A+Ox3  RASS: 0  GCS: 15  Exam: Normal, NAD, alert, oriented x 3, no focal deficits. PERRLA    Right sided dressing was saturated and changed, is currently CDI    Respiratory  Exam: Lungs clear to auscultation, Normal expansion/effort.        Cardiovascular  Exam: S1, S2.  Regular rate and rhythm.  no Peripheral edema        GI  Exam: Abdomen soft, Non-tender, Non-distended  Current Diet:  Clears      Tubes/Lines/Drains  ***  [x] Peripheral IV  [X] Arterial Line		[] R	[X] L	[] Fem	[X] Rad	[] Ax	Date Placed:  8/06    Extremities  Exam: Extremities warm, pink, well-perfused.        Derm:  Exam: Good skin turgor, no skin breakdown.      :   Exam: WNL    LABS  --------------------------------------------------------------------------------------  Labs:  CAPILLARY BLOOD GLUCOSE                          10.4   7.26  )-----------( 199      ( 06 Aug 2018 14:10 )             29.9       Auto Immature Granulocyte %: 0.3 % (08-06-18 @ 14:10)  Auto Neutrophil %: 52.8 % (08-06-18 @ 14:10)          --------------------------------------------------------------------------------------      IMAGING RESULTS < from: CT Angio Neck w/ IV Cont (07.16.18 @ 11:39) >  1.  Right: Short segment severe stenosis (greater than 70%) origin of the   internal carotid artery and external carotid artery and distal common   carotid artery. Mild vascular calcifications and soft tissue surrounding   the lumen representing noncalcified plaque and/or thrombus.    2.  Left: Short segment severe stenosis (greater than 70%) origin of the   internal carotid artery with extensive vascular calcifications.    < end of copied text > SICU Consultation Note  =====================================================  HPI: 78y Female with PMH below notable for b/l carotid stenosis, imaging performed revealed bilateral disease with greater than 70% occlusion b/l with R>L disease. Patient presented today for right sided CEA. Tolerated procedure well and is stable post operatively.    Surgery Information  OR time:  1.5h    EBL: 50         IV Fluids: 1600      Blood Products: none  UOP: NR - no sonia          PAST MEDICAL & SURGICAL HISTORY:  Macular degeneration  CAD (coronary artery disease) of bypass graft  Hypothyroid  HTN (hypertension)  Glaucoma  CAD (coronary artery disease)  History of hip replacement, total, right  S/P partial thyroidectomy  S/P cholecystectomy  CAD (coronary artery disease) of bypass graft: 3v avr    Home Meds: Home Medications:  Alphagan 0.2% ophthalmic solution: 1 drop(s) to each affected eye 3 times a day (06 Aug 2018 09:40)  aspirin 81 mg oral tablet: 1 tab(s) orally once a day (06 Aug 2018 09:40)  Crestor 20 mg oral tablet: 1 tab(s) orally once a day (at bedtime) (06 Aug 2018 09:40)  levothyroxine 125 mcg (0.125 mg) oral tablet: 1 tab(s) orally once a day (06 Aug 2018 09:40)  metoprolol succinate 25 mg oral tablet, extended release: 1 tab(s) orally once a day (06 Aug 2018 09:40)  valsartan 160 mg oral tablet: 1 tab(s) orally once a day (06 Aug 2018 09:40)  Xalatan 0.005% ophthalmic solution: 1 drop(s) to each affected eye once a day (in the evening) (06 Aug 2018 09:40)    Allergies: Allergies    erythromycin (Rash)  latex (Rash)  parsley (Headache)  penicillins (Rash)        Soc:   Advanced Directives: Presumed Full Code     ROS:    REVIEW OF SYSTEMS    [X] A ten-point review of systems was otherwise negative except as noted.  [ ] Due to altered mental status/intubation, subjective information were not able to be obtained from the patient. History was obtained, to the extent possible, from review of the chart and collateral sources of information.      CURRENT MEDICATIONS:   --------------------------------------------------------------------------------------  Neurologic Medications  acetaminophen   Tablet. 650 milliGRAM(s) Oral every 4 hours PRN Mild Pain (1 - 3)  HYDROmorphone  Injectable 0.5 milliGRAM(s) IV Push every 10 minutes PRN Moderate Pain (4 - 6)  HYDROmorphone  Injectable 1 milliGRAM(s) IV Push every 10 minutes PRN Severe Pain (7 - 10)  ondansetron Injectable 4 milliGRAM(s) IV Push once PRN Nausea and/or Vomiting  oxyCODONE    IR 5 milliGRAM(s) Oral every 4 hours PRN Mild Pain (1 - 3)  oxyCODONE    IR 10 milliGRAM(s) Oral every 6 hours PRN Severe Pain (7 - 10)    Respiratory Medications    Cardiovascular Medications  losartan 100 milliGRAM(s) Oral daily  metoprolol succinate ER 25 milliGRAM(s) Oral daily  metoprolol tartrate Injectable 5 milliGRAM(s) IV Push every 4 hours PRN SBP > 150 mmHg    Gastrointestinal Medications  lactated ringers. 1000 milliLiter(s) IV Continuous <Continuous>  senna 2 Tablet(s) Oral at bedtime    Genitourinary Medications    Hematologic/Oncologic Medications  aspirin enteric coated 81 milliGRAM(s) Oral daily    Antimicrobial/Immunologic Medications    Endocrine/Metabolic Medications  atorvastatin 80 milliGRAM(s) Oral at bedtime  levothyroxine 125 MICROGram(s) Oral daily    Topical/Other Medications  brimonidine 0.2% Ophthalmic Solution 1 Drop(s) Both EYES three times a day  latanoprost 0.005% Ophthalmic Solution 1 Drop(s) Both EYES at bedtime    --------------------------------------------------------------------------------------    VITAL SIGNS, INS/OUTS (last 24 hours):  --------------------------------------------------------------------------------------  ICU Vital Signs Last 24 Hrs  T(C): 36.8 (06 Aug 2018 13:52), Max: 36.8 (06 Aug 2018 13:52)  T(F): 98.2 (06 Aug 2018 13:52), Max: 98.2 (06 Aug 2018 13:52)  HR: 77 (06 Aug 2018 14:22) (64 - 92)  BP: 125/80 (06 Aug 2018 14:22) (125/80 - 154/60)  BP(mean): --  ABP: 148/57 (06 Aug 2018 14:22) (148/57 - 166/60)  ABP(mean): --  RR: 22 (06 Aug 2018 14:22) (15 - 22)  SpO2: 97% (06 Aug 2018 14:22) (97% - 100%)    I&O's Summary    --------------------------------------------------------------------------------------    EXAM:  General/Neuro NAD, A+Ox3  RASS: 0  GCS: 15  Exam: Normal, NAD, alert, oriented x 3, no focal deficits. PERRLA    Right sided dressing was saturated and changed, is currently CDI    Respiratory  Exam: Lungs clear to auscultation, Normal expansion/effort.        Cardiovascular  Exam: S1, S2.  Regular rate and rhythm.  no Peripheral edema        GI  Exam: Abdomen soft, Non-tender, Non-distended  Current Diet:  Clears      Tubes/Lines/Drains  ***  [x] Peripheral IV  [X] Arterial Line		[] R	[X] L	[] Fem	[X] Rad	[] Ax	Date Placed:  8/06    Extremities  Exam: Extremities warm, pink, well-perfused.        Derm:  Exam: Good skin turgor, no skin breakdown.      :   Exam: WNL    LABS  --------------------------------------------------------------------------------------  Labs:  CAPILLARY BLOOD GLUCOSE                          10.4   7.26  )-----------( 199      ( 06 Aug 2018 14:10 )             29.9       Auto Immature Granulocyte %: 0.3 % (08-06-18 @ 14:10)  Auto Neutrophil %: 52.8 % (08-06-18 @ 14:10)          --------------------------------------------------------------------------------------      IMAGING RESULTS < from: CT Angio Neck w/ IV Cont (07.16.18 @ 11:39) >  1.  Right: Short segment severe stenosis (greater than 70%) origin of the   internal carotid artery and external carotid artery and distal common   carotid artery. Mild vascular calcifications and soft tissue surrounding   the lumen representing noncalcified plaque and/or thrombus.    2.  Left: Short segment severe stenosis (greater than 70%) origin of the   internal carotid artery with extensive vascular calcifications.    < end of copied text >

## 2018-08-06 NOTE — BRIEF OPERATIVE NOTE - POST-OP DX
Carotid stenosis, asymptomatic, right  08/06/2018    Active  Junior Bonilla  Hematoma of neck, sequela  08/06/2018    Active  Junior Bonilla

## 2018-08-07 LAB
ANION GAP SERPL CALC-SCNC: 22 MMOL/L — HIGH (ref 7–14)
BASOPHILS # BLD AUTO: 0.02 K/UL — SIGNIFICANT CHANGE UP (ref 0–0.2)
BASOPHILS NFR BLD AUTO: 0.2 % — SIGNIFICANT CHANGE UP (ref 0–1)
BUN SERPL-MCNC: 18 MG/DL — SIGNIFICANT CHANGE UP (ref 10–20)
CALCIUM SERPL-MCNC: 7.9 MG/DL — LOW (ref 8.5–10.1)
CHLORIDE SERPL-SCNC: 97 MMOL/L — LOW (ref 98–110)
CK MB CFR SERPL CALC: 2.9 NG/ML — SIGNIFICANT CHANGE UP (ref 0.6–6.3)
CK MB CFR SERPL CALC: 2.9 NG/ML — SIGNIFICANT CHANGE UP (ref 0.6–6.3)
CK SERPL-CCNC: 129 U/L — SIGNIFICANT CHANGE UP (ref 0–225)
CK SERPL-CCNC: 132 U/L — SIGNIFICANT CHANGE UP (ref 0–225)
CO2 SERPL-SCNC: 20 MMOL/L — SIGNIFICANT CHANGE UP (ref 17–32)
CREAT SERPL-MCNC: 0.9 MG/DL — SIGNIFICANT CHANGE UP (ref 0.7–1.5)
EOSINOPHIL # BLD AUTO: 0 K/UL — SIGNIFICANT CHANGE UP (ref 0–0.7)
EOSINOPHIL NFR BLD AUTO: 0 % — SIGNIFICANT CHANGE UP (ref 0–8)
GLUCOSE SERPL-MCNC: 147 MG/DL — HIGH (ref 70–99)
HCT VFR BLD CALC: 30.4 % — LOW (ref 37–47)
HGB BLD-MCNC: 10.3 G/DL — LOW (ref 12–16)
IMM GRANULOCYTES NFR BLD AUTO: 0.3 % — SIGNIFICANT CHANGE UP (ref 0.1–0.3)
LYMPHOCYTES # BLD AUTO: 0.49 K/UL — LOW (ref 1.2–3.4)
LYMPHOCYTES # BLD AUTO: 5.1 % — LOW (ref 20.5–51.1)
MCHC RBC-ENTMCNC: 30 PG — SIGNIFICANT CHANGE UP (ref 27–31)
MCHC RBC-ENTMCNC: 33.9 G/DL — SIGNIFICANT CHANGE UP (ref 32–37)
MCV RBC AUTO: 88.6 FL — SIGNIFICANT CHANGE UP (ref 81–99)
MONOCYTES # BLD AUTO: 0.09 K/UL — LOW (ref 0.1–0.6)
MONOCYTES NFR BLD AUTO: 0.9 % — LOW (ref 1.7–9.3)
NEUTROPHILS # BLD AUTO: 9.03 K/UL — HIGH (ref 1.4–6.5)
NEUTROPHILS NFR BLD AUTO: 93.5 % — HIGH (ref 42.2–75.2)
NRBC # BLD: 0 /100 WBCS — SIGNIFICANT CHANGE UP (ref 0–0)
PHOSPHATE SERPL-MCNC: 4.1 MG/DL — SIGNIFICANT CHANGE UP (ref 2.1–4.9)
PLATELET # BLD AUTO: 188 K/UL — SIGNIFICANT CHANGE UP (ref 130–400)
POTASSIUM SERPL-MCNC: 4.3 MMOL/L — SIGNIFICANT CHANGE UP (ref 3.5–5)
POTASSIUM SERPL-SCNC: 4.3 MMOL/L — SIGNIFICANT CHANGE UP (ref 3.5–5)
RBC # BLD: 3.43 M/UL — LOW (ref 4.2–5.4)
RBC # FLD: 13 % — SIGNIFICANT CHANGE UP (ref 11.5–14.5)
SODIUM SERPL-SCNC: 139 MMOL/L — SIGNIFICANT CHANGE UP (ref 135–146)
TROPONIN T SERPL-MCNC: <0.01 NG/ML — SIGNIFICANT CHANGE UP
TROPONIN T SERPL-MCNC: <0.01 NG/ML — SIGNIFICANT CHANGE UP
WBC # BLD: 9.66 K/UL — SIGNIFICANT CHANGE UP (ref 4.8–10.8)
WBC # FLD AUTO: 9.66 K/UL — SIGNIFICANT CHANGE UP (ref 4.8–10.8)

## 2018-08-07 PROCEDURE — 99233 SBSQ HOSP IP/OBS HIGH 50: CPT

## 2018-08-07 RX ORDER — DIPHENHYDRAMINE HCL 50 MG
50 CAPSULE ORAL ONCE
Qty: 0 | Refills: 0 | Status: COMPLETED | OUTPATIENT
Start: 2018-08-07 | End: 2018-08-07

## 2018-08-07 RX ORDER — BENZOCAINE AND MENTHOL 5; 1 G/100ML; G/100ML
1 LIQUID ORAL ONCE
Qty: 0 | Refills: 0 | Status: COMPLETED | OUTPATIENT
Start: 2018-08-07 | End: 2018-08-07

## 2018-08-07 RX ADMIN — Medication 50 MILLIGRAM(S): at 06:11

## 2018-08-07 RX ADMIN — ATORVASTATIN CALCIUM 80 MILLIGRAM(S): 80 TABLET, FILM COATED ORAL at 21:32

## 2018-08-07 RX ADMIN — BRIMONIDINE TARTRATE 1 DROP(S): 2 SOLUTION/ DROPS OPHTHALMIC at 21:32

## 2018-08-07 RX ADMIN — BENZOCAINE AND MENTHOL 1 LOZENGE: 5; 1 LIQUID ORAL at 05:55

## 2018-08-07 RX ADMIN — Medication 100 MILLIGRAM(S): at 17:37

## 2018-08-07 RX ADMIN — SENNA PLUS 2 TABLET(S): 8.6 TABLET ORAL at 21:32

## 2018-08-07 RX ADMIN — BRIMONIDINE TARTRATE 1 DROP(S): 2 SOLUTION/ DROPS OPHTHALMIC at 05:54

## 2018-08-07 RX ADMIN — Medication 125 MICROGRAM(S): at 06:43

## 2018-08-07 RX ADMIN — LATANOPROST 1 DROP(S): 0.05 SOLUTION/ DROPS OPHTHALMIC; TOPICAL at 21:32

## 2018-08-07 NOTE — PROGRESS NOTE ADULT - ASSESSMENT
78y Female with history of b/l carotid stenosis R>L s/p right CEA. Patient tolerated procedure well    PLAN:   Neurologic: Continue to neurovascualr checks, pain control prn. Patient complaining of tooth pain, likely secondary to nerve retraction as per primary team, will continue to monitor.   Respiratory: wean to RA from NC as tolerated  Cardiovascular: Currently normotensive in sinus rhythm. systolic BP goal of 100-150, ASA, home meds hold HSQ   Gastrointestinal/Nutrition: Clear liquid diet overnight, plan to advance this morning  Renal/Genitourinary: No scott during case, monitor urine output and electrolyte status, replete prn  Hematologic: Holding HSQ, minimal EBL intraop but oozing at site post operatively, will continue to monitor and trend H&H  Infectious Disease: no active diagnosis, WNL  Lines/Tubes: left radial marcelino, b/l peripheral ivs  Endocrine: begin home synthroid  Disposition: ICU      Critical Care Diagnoses: S/P right CEA for b/l carotid stenosis 78y Female with history of b/l carotid stenosis R>L s/p right CEA. with return to OR for hematoma evacuation and placement of drain    PLAN:   Neurologic: Continue to neurovascualr checks, pain control prn. Patient initially complaining of tooth pain, likely secondary to nerve retraction resolved after return to OR  Respiratory: wean to RA from NC as tolerated  Cardiovascular: Currently normotensive in sinus rhythm. systolic BP goal of 100-150, ASA, home meds hold HSQ   Gastrointestinal/Nutrition: Clear liquid diet overnight, advance  as tolerated as per primary team  Renal/Genitourinary: No scott during case, monitor urine output and electrolyte status, replete prn  Hematologic: Holding HSQ, approximatley 100cc blood loss between two cases, continue to trend h&h  Infectious Disease: no active diagnosis, WNL. periop clinda  Lines/Tubes: left radial marcelino, b/l peripheral ivs  Endocrine: on home synthroid  Disposition: ICU, likely downgrade vs D/C today as per primary team 78y Female with history of b/l carotid stenosis R>L s/p right CEA. with return to OR for hematoma evacuation and placement of drain    PLAN:   Neurologic: Continue to neurovascualr checks, pain control prn. Patient initially complaining of tooth pain, likely secondary to nerve retraction resolved after return to OR  Respiratory: wean to RA from NC as tolerated  Cardiovascular: Currently normotensive in sinus rhythm. systolic BP goal of 100-150, ASA, home meds hold HSQ   Gastrointestinal/Nutrition: Clear liquid diet overnight, advance  as tolerated as per primary team  Renal/Genitourinary: No scott during case, monitor urine output and electrolyte status, replete prn  Hematologic: Holding HSQ, approximatley 100cc blood loss between two cases, continue to trend h&h  Infectious Disease: no active diagnosis, WNL. periop clinda  Lines/Tubes: left radial marcelino, b/l peripheral ivs  Endocrine: on home synthroid  Disposition: downgrade 78y Female with history of b/l carotid stenosis R>L s/p right CEA. with return to OR for hematoma evacuation and placement of drain    PLAN:   Neurologic: Continue to neurovascualr checks, pain control prn. Patient initially complaining of tooth pain, likely secondary to nerve retraction resolved after return to OR  Respiratory: wean to RA from NC as tolerated  Cardiovascular: Currently normotensive in sinus rhythm. systolic BP goal of 100-150, holding ASA and HSQ as per primary and in light of requiring take back  Gastrointestinal/Nutrition: Clear liquid diet overnight, advance  as tolerated as per primary team  Renal/Genitourinary: No scott during case, monitor urine output and electrolyte status, replete prn  Hematologic: Holding HSQ, approximatley 100cc blood loss between two cases, continue to trend h&h  Infectious Disease: no active diagnosis, WNL. periop clinda  Lines/Tubes: left radial marcelino, b/l peripheral ivs  Endocrine: on home synthroid  Disposition: downgrade

## 2018-08-07 NOTE — PROGRESS NOTE ADULT - SUBJECTIVE AND OBJECTIVE BOX
SHAE HENRIQUEZ  [id number]  78y Female    Indication for ICU admission: postop right CEA for neurovascular monitoring  Admit Date:08-06-18  ICU Date: 8/06/18  OR Date:8/06/18     erythromycin (Rash)  latex (Rash)  parsley (Headache)  penicillins (Rash)    PAST MEDICAL & SURGICAL HISTORY:  Macular degeneration  CAD (coronary artery disease) of bypass graft  Hypothyroid  HTN (hypertension)  Glaucoma  CAD (coronary artery disease)  History of hip replacement, total, right  S/P partial thyroidectomy  S/P cholecystectomy  CAD (coronary artery disease) of bypass graft: 3v avr    Home Medications:  Alphagan 0.2% ophthalmic solution: 1 drop(s) to each affected eye 3 times a day (06 Aug 2018 09:40)  aspirin 81 mg oral tablet: 1 tab(s) orally once a day (06 Aug 2018 09:40)  Crestor 20 mg oral tablet: 1 tab(s) orally once a day (at bedtime) (06 Aug 2018 09:40)  levothyroxine 125 mcg (0.125 mg) oral tablet: 1 tab(s) orally once a day (06 Aug 2018 09:40)  metoprolol succinate 25 mg oral tablet, extended release: 1 tab(s) orally once a day (06 Aug 2018 09:40)  valsartan 160 mg oral tablet: 1 tab(s) orally once a day (06 Aug 2018 09:40)  Xalatan 0.005% ophthalmic solution: 1 drop(s) to each affected eye once a day (in the evening) (06 Aug 2018 09:40)        24HRS EVENT:  POD#1 R CEA with   OR time 1.5h/ 50ml EBL/ 1600 IVF/ UOP not recorded    Neuro: pain control with tylenol, oxycodone,   Resp: On nasal cannula, wean off as tolerated  CVS: Noted in PACU to have saturated through dressing with noted slow ooze, not controlled by holding pressure or surgicel placed on suture line and steri-strips. Slowly expanding right neck hematoma and difficulty swallowing, brought back to OR by vascular surgery for neck exploration.  On metoprolol, losartan, statin. Trend troponins, first set negative  GI: CLD held for OR takeback. Difficulty swallowing with slowly expanding neck hematoma. Bowel regimen: senna  /Renal: pt voiding, no scott placed. resolved Hypokalemia  Heme: ASA, holding SQH  ID:  Endo: synthroid           DVT PTX: aspirin, SCDs. Holding SQH    GI PTX:    ***Tubes/Lines/Drains  ***  Peripheral IV  Arterial Line	Left radial  8/06           REVIEW OF SYSTEMS    [] A ten-point review of systems was otherwise negative except as noted. SHAE HENRIQUEZ  [id number]  78y Female    Indication for ICU admission: postop right CEA for neurovascular monitoring  Admit Date:08-06-18  ICU Date: 8/06/18  OR Date:8/06/18     erythromycin (Rash)  latex (Rash)  parsley (Headache)  penicillins (Rash)    PAST MEDICAL & SURGICAL HISTORY:  Macular degeneration  CAD (coronary artery disease) of bypass graft  Hypothyroid  HTN (hypertension)  Glaucoma  CAD (coronary artery disease)  History of hip replacement, total, right  S/P partial thyroidectomy  S/P cholecystectomy  CAD (coronary artery disease) of bypass graft: 3v avr    Home Medications:  Alphagan 0.2% ophthalmic solution: 1 drop(s) to each affected eye 3 times a day (06 Aug 2018 09:40)  aspirin 81 mg oral tablet: 1 tab(s) orally once a day (06 Aug 2018 09:40)  Crestor 20 mg oral tablet: 1 tab(s) orally once a day (at bedtime) (06 Aug 2018 09:40)  levothyroxine 125 mcg (0.125 mg) oral tablet: 1 tab(s) orally once a day (06 Aug 2018 09:40)  metoprolol succinate 25 mg oral tablet, extended release: 1 tab(s) orally once a day (06 Aug 2018 09:40)  valsartan 160 mg oral tablet: 1 tab(s) orally once a day (06 Aug 2018 09:40)  Xalatan 0.005% ophthalmic solution: 1 drop(s) to each affected eye once a day (in the evening) (06 Aug 2018 09:40)        24HRS EVENT:  POD#1 R CEA with return to OR for reexploration  OR time 1.5h/ 50ml EBL/ 1600 IVF/ UOP not recorded  OR2, time 1h EBL 50cc    Neuro: pain control with tylenol, oxycodone,   Resp: On nasal cannula, wean off as tolerated  CVS: Noted in PACU to have saturated through dressing with noted slow ooze, not controlled by holding pressure or surgicel placed on suture line and steri-strips. Slowly expanding right neck hematoma and difficulty swallowing, brought back to OR by vascular surgery for neck exploration.  On metoprolol, losartan, statin. Trend troponins,  negative x2  GI: CLD held for OR takeback. Difficulty swallowing with slowly expanding neck hematoma. Bowel regimen: senna  /Renal: pt voiding, no scott placed. resolved Hypokalemia  Heme: ASA, holding SQH  ID: periop clinda  Endo: synthroid           DVT PTX: aspirin, SCDs. Holding SQH    GI PTX:    ***Tubes/Lines/Drains  ***  Peripheral IV  Arterial Line	Left radial  8/06           REVIEW OF SYSTEMS    [X] A ten-point review of systems was otherwise negative except as noted.        Daily Height in cm: 154.94 (06 Aug 2018 10:45)    Daily     Diet, Clear Liquid (08-06-18 @ 19:56)      CURRENT MEDS:  Neurologic Medications  acetaminophen   Tablet. 650 milliGRAM(s) Oral every 4 hours PRN Mild Pain (1 - 3)  oxyCODONE    IR 10 milliGRAM(s) Oral every 6 hours PRN Severe Pain (7 - 10)  oxyCODONE    IR 5 milliGRAM(s) Oral every 4 hours PRN Mild Pain (1 - 3)    Respiratory Medications    Cardiovascular Medications  losartan 100 milliGRAM(s) Oral daily  metoprolol succinate ER 25 milliGRAM(s) Oral daily  metoprolol tartrate Injectable 5 milliGRAM(s) IV Push every 4 hours PRN SBP > 150 mmHg    Gastrointestinal Medications  lactated ringers. 1000 milliLiter(s) IV Continuous <Continuous>  senna 2 Tablet(s) Oral at bedtime    Genitourinary Medications    Hematologic/Oncologic Medications    Antimicrobial/Immunologic Medications  clindamycin IVPB 600 milliGRAM(s) IV Intermittent every 12 hours    Endocrine/Metabolic Medications  atorvastatin 80 milliGRAM(s) Oral at bedtime  levothyroxine 125 MICROGram(s) Oral daily    Topical/Other Medications  brimonidine 0.2% Ophthalmic Solution 1 Drop(s) Both EYES three times a day  latanoprost 0.005% Ophthalmic Solution 1 Drop(s) Both EYES at bedtime      ICU Vital Signs Last 24 Hrs  T(C): 36.9 (07 Aug 2018 04:00), Max: 37.1 (06 Aug 2018 22:30)  T(F): 98.4 (07 Aug 2018 04:00), Max: 98.7 (06 Aug 2018 22:30)  HR: 64 (07 Aug 2018 06:30) (56 - 92)  BP: 114/51 (06 Aug 2018 22:00) (108/67 - 154/60)  BP(mean): --  ABP: 112/54 (07 Aug 2018 06:30) (112/54 - 166/60)  ABP(mean): 78 (07 Aug 2018 06:30) (70 - 90)  RR: 13 (07 Aug 2018 06:30) (12 - 23)  SpO2: 99% (07 Aug 2018 06:30) (96% - 100%)      Adult Advanced Hemodynamics Last 24 Hrs  CVP(mm Hg): --  CVP(cm H2O): --  CO: --  CI: --  PA: --  PA(mean): --  PCWP: --  SVR: --  SVRI: --  PVR: --  PVRI: --          I&O's Summary    06 Aug 2018 07:01  -  07 Aug 2018 07:00  --------------------------------------------------------  IN: 1660 mL / OUT: 410 mL / NET: 1250 mL      I&O's Detail    06 Aug 2018 07:01  -  07 Aug 2018 07:00  --------------------------------------------------------  IN:    lactated ringers.: 1200 mL    lactated ringers.: 100 mL    lactated ringers.: 200 mL    lactated ringers.: 100 mL    Oral Fluid: 60 mL  Total IN: 1660 mL    OUT:    Bulb: 10 mL    Voided: 400 mL  Total OUT: 410 mL    Total NET: 1250 mL          PHYSICAL EXAM:    General/Neuro NAD  RASS:  0           GCS:     = E 4 / V 5  / M 6     Deficits:     alert & oriented x 3, no focal deficits  Pupils: Reactive bl  Right sided neck dressign CDI, sanguinous drainage from MEDINA, no hematoma at site but bruising seen around operative site    Lungs:      clear to auscultation, Normal expansion/effort.     Cardiovascular : S1, S2.  Regular rate and rhythm.  no Peripheral edema   Cardiac Rhythm: Normal Sinus Rhythm    GI: Abdomen soft, Non-tender, Non-distended.      Extremities: Extremities warm, pink, well-perfused.     Derm: Good skin turgor, no skin breakdown.      :       Freely voiding      Imaging:  CXR WNL, pending official read    LABS:  CAPILLARY BLOOD GLUCOSE                              10.3   9.66  )-----------( 188      ( 07 Aug 2018 00:29 )             30.4       08-07    139  |  97<L>  |  18  ----------------------------<  147<H>  4.3   |  20  |  0.9    Ca    7.9<L>      07 Aug 2018 00:29  Phos  4.1     08-07  Mg     1.9     08-06        PT/INR - ( 06 Aug 2018 14:10 )   PT: 12.40 sec;   INR: 1.15 ratio         PTT - ( 06 Aug 2018 14:10 )  PTT:>200.0 sec  CARDIAC MARKERS ( 07 Aug 2018 02:03 )  x     / <0.01 ng/mL / 129 U/L / x     / 2.9 ng/mL  CARDIAC MARKERS ( 06 Aug 2018 14:10 )  x     / <0.01 ng/mL / 74 U/L / x     / x

## 2018-08-07 NOTE — PROGRESS NOTE ADULT - SUBJECTIVE AND OBJECTIVE BOX
VASCULAR SURGERY PROGRESS NOTE    Post-Op Day # 1  Procedure: S/p right carotid endarterectomy 8/6, s/p return to OR for neck exploration 8/6  Events of past 24 hours: Patient was found to have saturated dressing, difficulty swallowing, slowly expanding right neck hematoma in the PACU. Brought back to OR for neck exploration. Patient reported feeling better s/p neck exploration. Patient seen and evaluated bedside this morning. States she is having neck swelling similar to previous allergic reactions. Denies itching, difficulty breathing, wheezing, stridor, or recent allergen exposure. Benadryl ordered for patient, continued monitoring in ICU. Dressings are CDI, MEDINA drain in place - 20cc output since OR.     PAST MEDICAL & SURGICAL HISTORY:  Macular degeneration  CAD (coronary artery disease) of bypass graft  Hypothyroid  HTN (hypertension)  Glaucoma  CAD (coronary artery disease)  History of hip replacement, total, right  S/P partial thyroidectomy  S/P cholecystectomy  CAD (coronary artery disease) of bypass graft: 3v avr    Vital Signs Last 24 Hrs  T(C): 36.9 (07 Aug 2018 04:00), Max: 37.1 (06 Aug 2018 22:30)  T(F): 98.4 (07 Aug 2018 04:00), Max: 98.7 (06 Aug 2018 22:30)  HR: 60 (07 Aug 2018 05:30) (56 - 92)  BP: 114/51 (06 Aug 2018 22:00) (108/67 - 154/60)  BP(mean): --  RR: 18 (07 Aug 2018 05:30) (12 - 23)  SpO2: 98% (07 Aug 2018 05:30) (96% - 100%)    Pain (0-10): 0           Pain Control Adequate: [X] YES [] N  Diet: Clear liquid    I&O's Detail    06 Aug 2018 07:01  -  07 Aug 2018 06:09  --------------------------------------------------------  IN:    lactated ringers.: 1100 mL    lactated ringers.: 100 mL    lactated ringers.: 200 mL    lactated ringers.: 100 mL    Oral Fluid: 60 mL  Total IN: 1560 mL    OUT:    Bulb: 10 mL    Voided: 400 mL  Total OUT: 410 mL    Total NET: 1150 mL    PHYSICAL EXAM    Appearance: Normal	  HEENT:   Normal oral mucosa, PERRL, EOMI	  Neck: Supple, dressing in place, no evidence of hematoma or saturation. MEDINA drain in place.   Cardiovascular: Normal S1 S2, No JVD, No murmurs,   Respiratory: Lungs clear to auscultation/Decreased Breath Sounds/No Rales, Rhonchi, Wheezing	  Gastrointestinal:  Soft, Non-tender, + BS	  Skin: No rashes, No ecchymoses, No cyanosis  Extremities: Normal range of motion, No clubbing, cyanosis or edema  Neurologic: Non-focal  Psychiatry: A & O x 3, Mood & affect appropriate  PULSES: Intact    MEDICATIONS:   MEDICATIONS  (STANDING):  atorvastatin 80 milliGRAM(s) Oral at bedtime  brimonidine 0.2% Ophthalmic Solution 1 Drop(s) Both EYES three times a day  diphenhydrAMINE   Injectable 50 milliGRAM(s) IV Push once  lactated ringers. 1000 milliLiter(s) (100 mL/Hr) IV Continuous <Continuous>  latanoprost 0.005% Ophthalmic Solution 1 Drop(s) Both EYES at bedtime  levothyroxine 125 MICROGram(s) Oral daily  losartan 100 milliGRAM(s) Oral daily  metoprolol succinate ER 25 milliGRAM(s) Oral daily  senna 2 Tablet(s) Oral at bedtime    MEDICATIONS  (PRN):  acetaminophen   Tablet. 650 milliGRAM(s) Oral every 4 hours PRN Mild Pain (1 - 3)  metoprolol tartrate Injectable 5 milliGRAM(s) IV Push every 4 hours PRN SBP > 150 mmHg  oxyCODONE    IR 10 milliGRAM(s) Oral every 6 hours PRN Severe Pain (7 - 10)  oxyCODONE    IR 5 milliGRAM(s) Oral every 4 hours PRN Mild Pain (1 - 3)    LAB/STUDIES:                        10.3   9.66  )-----------( 188      ( 07 Aug 2018 00:29 )             30.4     08-07    139  |  97<L>  |  18  ----------------------------<  147<H>  4.3   |  20  |  0.9    Ca    7.9<L>      07 Aug 2018 00:29  Phos  4.1     08-07  Mg     1.9     08-06      PT/INR - ( 06 Aug 2018 14:10 )   PT: 12.40 sec;   INR: 1.15 ratio    PTT - ( 06 Aug 2018 14:10 )  PTT:>200.0 sec    CARDIAC MARKERS ( 07 Aug 2018 02:03 )  x     / <0.01 ng/mL / 129 U/L / x     / 2.9 ng/mL  CARDIAC MARKERS ( 06 Aug 2018 14:10 )  x     / <0.01 ng/mL / 74 U/L / x     / x        ASSESSMENT/PLAN:  ICU for neurovascular monitoring  Pain control   Room air oxygen when able  Clears, advance diet as tolerated  Monitor H/H    SPECTRA: [id number] VASCULAR SURGERY PROGRESS NOTE    Post-Op Day # 1  Procedure: S/p right carotid endarterectomy 8/6, s/p return to OR for neck exploration 8/6  Events of past 24 hours: Patient was found to have saturated dressing, difficulty swallowing, slowly expanding right neck hematoma in the PACU. Brought back to OR for neck exploration. Patient reported feeling better s/p neck exploration. Patient seen and evaluated bedside this morning. States she is having neck swelling similar to previous allergic reactions. Denies itching, difficulty breathing, wheezing, stridor, or recent allergen exposure. Benadryl ordered for patient, continued monitoring in ICU. Dressings are CDI, MEDINA drain in place - 20cc output since OR.     PAST MEDICAL & SURGICAL HISTORY:  Macular degeneration  CAD (coronary artery disease) of bypass graft  Hypothyroid  HTN (hypertension)  Glaucoma  CAD (coronary artery disease)  History of hip replacement, total, right  S/P partial thyroidectomy  S/P cholecystectomy  CAD (coronary artery disease) of bypass graft: 3v avr    Vital Signs Last 24 Hrs  T(C): 36.9 (07 Aug 2018 04:00), Max: 37.1 (06 Aug 2018 22:30)  T(F): 98.4 (07 Aug 2018 04:00), Max: 98.7 (06 Aug 2018 22:30)  HR: 60 (07 Aug 2018 05:30) (56 - 92)  BP: 114/51 (06 Aug 2018 22:00) (108/67 - 154/60)  BP(mean): --  RR: 18 (07 Aug 2018 05:30) (12 - 23)  SpO2: 98% (07 Aug 2018 05:30) (96% - 100%)    Pain (0-10): 0           Pain Control Adequate: [X] YES [] N  Diet: Clear liquid    I&O's Detail    06 Aug 2018 07:01  -  07 Aug 2018 06:09  --------------------------------------------------------  IN:    lactated ringers.: 1100 mL    lactated ringers.: 100 mL    lactated ringers.: 200 mL    lactated ringers.: 100 mL    Oral Fluid: 60 mL  Total IN: 1560 mL    OUT:    Bulb: 10 mL    Voided: 400 mL  Total OUT: 410 mL    Total NET: 1150 mL    PHYSICAL EXAM    Appearance: Normal	  HEENT:   Normal oral mucosa, PERRL, EOMI	  Neck: Supple, dressing in place, no evidence of hematoma or saturation. MEDINA drain in place.   Cardiovascular: Normal S1 S2, No JVD, No murmurs,   Respiratory: Lungs clear to auscultation/Decreased Breath Sounds/No Rales, Rhonchi, Wheezing	  Gastrointestinal:  Soft, Non-tender, + BS	  Skin: No rashes, No ecchymoses, No cyanosis  Extremities: Normal range of motion, No clubbing, cyanosis or edema  Neurologic: Non-focal  Psychiatry: A & O x 3, Mood & affect appropriate  PULSES: Intact    MEDICATIONS:   MEDICATIONS  (STANDING):  atorvastatin 80 milliGRAM(s) Oral at bedtime  brimonidine 0.2% Ophthalmic Solution 1 Drop(s) Both EYES three times a day  diphenhydrAMINE   Injectable 50 milliGRAM(s) IV Push once  lactated ringers. 1000 milliLiter(s) (100 mL/Hr) IV Continuous <Continuous>  latanoprost 0.005% Ophthalmic Solution 1 Drop(s) Both EYES at bedtime  levothyroxine 125 MICROGram(s) Oral daily  losartan 100 milliGRAM(s) Oral daily  metoprolol succinate ER 25 milliGRAM(s) Oral daily  senna 2 Tablet(s) Oral at bedtime    MEDICATIONS  (PRN):  acetaminophen   Tablet. 650 milliGRAM(s) Oral every 4 hours PRN Mild Pain (1 - 3)  metoprolol tartrate Injectable 5 milliGRAM(s) IV Push every 4 hours PRN SBP > 150 mmHg  oxyCODONE    IR 10 milliGRAM(s) Oral every 6 hours PRN Severe Pain (7 - 10)  oxyCODONE    IR 5 milliGRAM(s) Oral every 4 hours PRN Mild Pain (1 - 3)    LAB/STUDIES:                        10.3   9.66  )-----------( 188      ( 07 Aug 2018 00:29 )             30.4     08-07    139  |  97<L>  |  18  ----------------------------<  147<H>  4.3   |  20  |  0.9    Ca    7.9<L>      07 Aug 2018 00:29  Phos  4.1     08-07  Mg     1.9     08-06      PT/INR - ( 06 Aug 2018 14:10 )   PT: 12.40 sec;   INR: 1.15 ratio    PTT - ( 06 Aug 2018 14:10 )  PTT:>200.0 sec    CARDIAC MARKERS ( 07 Aug 2018 02:03 )  x     / <0.01 ng/mL / 129 U/L / x     / 2.9 ng/mL  CARDIAC MARKERS ( 06 Aug 2018 14:10 )  x     / <0.01 ng/mL / 74 U/L / x     / x

## 2018-08-07 NOTE — PROGRESS NOTE ADULT - ATTENDING COMMENTS
Pt taken back to OR for evacuation of hematoma and neck washout and placement of drain yesterday.  She looks goo, minimal drain output.  Neck soft and supple.  Will remove drain today and plan on discharge tomorrow
Assessment and plan above were modified and discussed with residents, physician assistants, and nurses.  I have provided 25 min of Critical Care to this patient.

## 2018-08-07 NOTE — CHART NOTE - NSCHARTNOTEFT_GEN_A_CORE
called to bedside by rn, as pt reports feeling like her throat is swelling. pt is speaking in full sentences, maintaining po sats, lungs ctabl, managing secretions. 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 pt denies itching. pt denies exposure to known allergen. benadryl ordered, will continue to monitor.

## 2018-08-07 NOTE — CHART NOTE - NSCHARTNOTEFT_GEN_A_CORE
SICU DOWNGRADE NOTE    78y Female transferred to floor from SICU s/p right CEA with return to OR for hematoma evacuation and placement of tomy drain. Patient has been stable all night, taken off nasal cannula and on room air, voided post operatively, tolerated clear diet overnight and was advanced to regular diet in the morning. Patient started Asa and is pending primary team approval for HSQ for dvt prophylaxis. Patient reports no significant pain and has no complaints other than mild tenderness at incision site. She is being downgraded in stable condition    SUBJECTIVE:  -------------------------------------------------------------------------------------------  PMHx/PSHx: PAST MEDICAL & SURGICAL HISTORY:  Macular degeneration  CAD (coronary artery disease) of bypass graft  Hypothyroid  HTN (hypertension)  Glaucoma  CAD (coronary artery disease)  History of hip replacement, total, right  S/P partial thyroidectomy  S/P cholecystectomy  CAD (coronary artery disease) of bypass graft: 3v avr    Allergies: erythromycin (Rash)  latex (Rash)  parsley (Headache)  penicillins (Rash)    -------------------------------------------------------------------------------------------    OBJECTIVE:  -------------------------------------------------------------------------------------------  Vitals:  T(C): 36.4 (08-07-18 @ 08:00), Max: 37.1 (08-06-18 @ 22:30)  HR: 58 (08-07-18 @ 10:30) (56 - 92)  BP: 98/39 (08-07-18 @ 08:45) (98/39 - 154/60)  RR: 13 (08-07-18 @ 10:30) (12 - 29)  SpO2: 96% (08-07-18 @ 10:30) (92% - 100%)  Wt(kg): --    -------------------------------------------------------------------------------------------  I&O's Summary    06 Aug 2018 07:01  -  07 Aug 2018 07:00  --------------------------------------------------------  IN: 1660 mL / OUT: 425 mL / NET: 1235 mL    07 Aug 2018 07:01  -  07 Aug 2018 11:29  --------------------------------------------------------  IN: 645 mL / OUT: 400 mL / NET: 245 mL      -------------------------------------------------------------------------------------------  Labs:  Labs:  CAPILLARY BLOOD GLUCOSE                              10.3   9.66  )-----------( 188      ( 07 Aug 2018 00:29 )             30.4       Auto Immature Granulocyte %: 0.3 % (08-07-18 @ 00:29)  Auto Neutrophil %: 93.5 % (08-07-18 @ 00:29)  Auto Immature Granulocyte %: 0.3 % (08-06-18 @ 14:10)  Auto Neutrophil %: 52.8 % (08-06-18 @ 14:10)    08-07    139  |  97<L>  |  18  ----------------------------<  147<H>  4.3   |  20  |  0.9      Calcium, Total Serum: 7.9 mg/dL (08-07-18 @ 00:29)  Phosphorus Level, Serum: 4.1 mg/dL (08-07-18 @ 00:29)  Magnesium, Serum: 1.9 mg/dL (08-06-18 @ 20:50)    Coags:     12.40  ----< 1.15    ( 06 Aug 2018 14:10 )     >200.0       CARDIAC MARKERS ( 07 Aug 2018 08:00 )  x     / <0.01 ng/mL / 132 U/L / x     / 2.9 ng/mL  CARDIAC MARKERS ( 07 Aug 2018 02:03 )  x     / <0.01 ng/mL / 129 U/L / x     / 2.9 ng/mL  CARDIAC MARKERS ( 06 Aug 2018 14:10 )  x     / <0.01 ng/mL / 74 U/L / x     / x                -------------------------------------------------------------------------------------------  Active Medications:  MEDICATIONS  (STANDING):  atorvastatin 80 milliGRAM(s) Oral at bedtime  brimonidine 0.2% Ophthalmic Solution 1 Drop(s) Both EYES three times a day  clindamycin IVPB 600 milliGRAM(s) IV Intermittent every 12 hours  latanoprost 0.005% Ophthalmic Solution 1 Drop(s) Both EYES at bedtime  levothyroxine 125 MICROGram(s) Oral daily  losartan 100 milliGRAM(s) Oral daily  metoprolol succinate ER 25 milliGRAM(s) Oral daily  senna 2 Tablet(s) Oral at bedtime    MEDICATIONS  (PRN):  acetaminophen   Tablet. 650 milliGRAM(s) Oral every 4 hours PRN Mild Pain (1 - 3)  metoprolol tartrate Injectable 5 milliGRAM(s) IV Push every 4 hours PRN SBP > 150 mmHg  oxyCODONE    IR 10 milliGRAM(s) Oral every 6 hours PRN Severe Pain (7 - 10)  oxyCODONE    IR 5 milliGRAM(s) Oral every 4 hours PRN Mild Pain (1 - 3)    -------------------------------------------------------------------------------------------  SIGN OUT AT 08-07-18 @ 11:29 GIVEN TO: BRIDGET ACEVES

## 2018-08-07 NOTE — PROGRESS NOTE ADULT - ASSESSMENT
S/p right carotid endarterectomy 8/6, s/p return to OR for neck exploration 8/6    - advance diet, mechanical soft  - remove drain in the afternoon  - out of bed to chair, ambulate in the afternoon  - downgrade  - anticipate d/c home [facility] [id number]

## 2018-08-08 ENCOUNTER — TRANSCRIPTION ENCOUNTER (OUTPATIENT)
Age: 78
End: 2018-08-08

## 2018-08-08 VITALS
RESPIRATION RATE: 18 BRPM | HEART RATE: 60 BPM | DIASTOLIC BLOOD PRESSURE: 52 MMHG | SYSTOLIC BLOOD PRESSURE: 87 MMHG | TEMPERATURE: 96 F

## 2018-08-08 PROBLEM — H40.9 UNSPECIFIED GLAUCOMA: Chronic | Status: ACTIVE | Noted: 2018-08-01

## 2018-08-08 PROBLEM — E03.9 HYPOTHYROIDISM, UNSPECIFIED: Chronic | Status: ACTIVE | Noted: 2018-08-01

## 2018-08-08 PROBLEM — H35.30 UNSPECIFIED MACULAR DEGENERATION: Chronic | Status: ACTIVE | Noted: 2018-08-01

## 2018-08-08 PROBLEM — I10 ESSENTIAL (PRIMARY) HYPERTENSION: Chronic | Status: ACTIVE | Noted: 2018-08-01

## 2018-08-08 LAB
ANION GAP SERPL CALC-SCNC: 12 MMOL/L — SIGNIFICANT CHANGE UP (ref 7–14)
BASOPHILS # BLD AUTO: 0.02 K/UL — SIGNIFICANT CHANGE UP (ref 0–0.2)
BASOPHILS NFR BLD AUTO: 0.2 % — SIGNIFICANT CHANGE UP (ref 0–1)
BUN SERPL-MCNC: 25 MG/DL — HIGH (ref 10–20)
CALCIUM SERPL-MCNC: 8.5 MG/DL — SIGNIFICANT CHANGE UP (ref 8.5–10.1)
CHLORIDE SERPL-SCNC: 99 MMOL/L — SIGNIFICANT CHANGE UP (ref 98–110)
CO2 SERPL-SCNC: 27 MMOL/L — SIGNIFICANT CHANGE UP (ref 17–32)
CREAT SERPL-MCNC: 1.1 MG/DL — SIGNIFICANT CHANGE UP (ref 0.7–1.5)
EOSINOPHIL # BLD AUTO: 0.03 K/UL — SIGNIFICANT CHANGE UP (ref 0–0.7)
EOSINOPHIL NFR BLD AUTO: 0.2 % — SIGNIFICANT CHANGE UP (ref 0–8)
GLUCOSE SERPL-MCNC: 127 MG/DL — HIGH (ref 70–99)
HCT VFR BLD CALC: 27.5 % — LOW (ref 37–47)
HGB BLD-MCNC: 9.3 G/DL — LOW (ref 12–16)
IMM GRANULOCYTES NFR BLD AUTO: 0.8 % — HIGH (ref 0.1–0.3)
LYMPHOCYTES # BLD AUTO: 1.66 K/UL — SIGNIFICANT CHANGE UP (ref 1.2–3.4)
LYMPHOCYTES # BLD AUTO: 13.1 % — LOW (ref 20.5–51.1)
MAGNESIUM SERPL-MCNC: 2.1 MG/DL — SIGNIFICANT CHANGE UP (ref 1.8–2.4)
MCHC RBC-ENTMCNC: 30 PG — SIGNIFICANT CHANGE UP (ref 27–31)
MCHC RBC-ENTMCNC: 33.8 G/DL — SIGNIFICANT CHANGE UP (ref 32–37)
MCV RBC AUTO: 88.7 FL — SIGNIFICANT CHANGE UP (ref 81–99)
MONOCYTES # BLD AUTO: 0.77 K/UL — HIGH (ref 0.1–0.6)
MONOCYTES NFR BLD AUTO: 6.1 % — SIGNIFICANT CHANGE UP (ref 1.7–9.3)
NEUTROPHILS # BLD AUTO: 10.09 K/UL — HIGH (ref 1.4–6.5)
NEUTROPHILS NFR BLD AUTO: 79.6 % — HIGH (ref 42.2–75.2)
NRBC # BLD: 0 /100 WBCS — SIGNIFICANT CHANGE UP (ref 0–0)
PHOSPHATE SERPL-MCNC: 4.1 MG/DL — SIGNIFICANT CHANGE UP (ref 2.1–4.9)
PLATELET # BLD AUTO: 168 K/UL — SIGNIFICANT CHANGE UP (ref 130–400)
POTASSIUM SERPL-MCNC: 4.3 MMOL/L — SIGNIFICANT CHANGE UP (ref 3.5–5)
POTASSIUM SERPL-SCNC: 4.3 MMOL/L — SIGNIFICANT CHANGE UP (ref 3.5–5)
RBC # BLD: 3.1 M/UL — LOW (ref 4.2–5.4)
RBC # FLD: 13.4 % — SIGNIFICANT CHANGE UP (ref 11.5–14.5)
SODIUM SERPL-SCNC: 138 MMOL/L — SIGNIFICANT CHANGE UP (ref 135–146)
WBC # BLD: 12.67 K/UL — HIGH (ref 4.8–10.8)
WBC # FLD AUTO: 12.67 K/UL — HIGH (ref 4.8–10.8)

## 2018-08-08 RX ORDER — ACETAMINOPHEN 500 MG
2 TABLET ORAL
Qty: 0 | Refills: 0 | COMMUNITY
Start: 2018-08-08

## 2018-08-08 RX ADMIN — LOSARTAN POTASSIUM 100 MILLIGRAM(S): 100 TABLET, FILM COATED ORAL at 05:28

## 2018-08-08 RX ADMIN — Medication 81 MILLIGRAM(S): at 11:03

## 2018-08-08 RX ADMIN — BRIMONIDINE TARTRATE 1 DROP(S): 2 SOLUTION/ DROPS OPHTHALMIC at 05:27

## 2018-08-08 RX ADMIN — Medication 25 MILLIGRAM(S): at 05:28

## 2018-08-08 RX ADMIN — Medication 125 MICROGRAM(S): at 05:28

## 2018-08-08 RX ADMIN — Medication 100 MILLIGRAM(S): at 05:28

## 2018-08-08 NOTE — DISCHARGE NOTE ADULT - HOSPITAL COURSE
79 y/o female with h/o carotid stenosis, admitted for right carotid endarterectomy in OR 8/6. Postoperatively, patient was found to have saturated dressing, difficulty swallowing, slowly expanding right neck hematoma in the PACU. Brought back to OR for neck exploration, evacuation and placement of MEDINA drain. Postoperatively, patient was admitted to the ICU for neurovascular monitoring. MEDINA drain was subsequently removed, patient was downgraded from ICU to floors. Patient reports doing well postoperatively with no active complaints. Seen and examined this morning, patient is medically stable for discharge.

## 2018-08-08 NOTE — DISCHARGE NOTE ADULT - CARE PROVIDER_API CALL
Junior Bonilla), Vascular Surgery  [address] [place] [address]  Phone: [phone]  Fax: [phone]

## 2018-08-08 NOTE — DISCHARGE NOTE ADULT - ADDITIONAL INSTRUCTIONS
Continue all home medications, continue aspirin daily. Tylenol and motrin for pain control. No driving for 4 weeks. Follow up with Dr. Bonilla in vascular clinic in 1-2 weeks. If worsening symptoms, intractable bleeding, fever, nausea, chills, vomiting - return to ED.

## 2018-08-08 NOTE — DISCHARGE NOTE ADULT - CARE PLAN
Principal Discharge DX:	CAD (coronary artery disease)  Goal:	Full recovery  Assessment and plan of treatment:	S/p right carotid endarterectomy, RTOR for neck hematoma drainage

## 2018-08-08 NOTE — DISCHARGE NOTE ADULT - NS AS ACTIVITY OBS
Do not drive or operate machinery/No Heavy lifting/straining/Showering allowed/No driving for 4 weeks.

## 2018-08-08 NOTE — DISCHARGE NOTE ADULT - MEDICATION SUMMARY - MEDICATIONS TO TAKE
I will START or STAY ON the medications listed below when I get home from the hospital:    aspirin 81 mg oral tablet  -- 1 tab(s) by mouth once a day  -- Indication: For Continue home medications as before    acetaminophen 325 mg oral tablet  -- 2 tab(s) by mouth every 4 hours, As needed, Mild Pain (1 - 3)  -- Indication: For Continue home medications as before    valsartan 160 mg oral tablet  -- 1 tab(s) by mouth once a day  -- Indication: For Continue home medications as before    Crestor 20 mg oral tablet  -- 1 tab(s) by mouth once a day (at bedtime)  -- Indication: For Continue home medications as before    metoprolol succinate 25 mg oral tablet, extended release  -- 1 tab(s) by mouth once a day  -- Indication: For Continue home medications as before    Xalatan 0.005% ophthalmic solution  -- 1 drop(s) to each affected eye once a day (in the evening)  -- Indication: For Continue home medications as before    Alphagan 0.2% ophthalmic solution  -- 1 drop(s) to each affected eye 3 times a day  -- Indication: For Continue home medications as before    levothyroxine 125 mcg (0.125 mg) oral tablet  -- 1 tab(s) by mouth once a day  -- Indication: For Continue home medications as before

## 2018-08-08 NOTE — DISCHARGE NOTE ADULT - PATIENT PORTAL LINK FT
36.6 You can access the [facility], offered by [facility], by registering with the following website: [email or web address]

## 2018-08-08 NOTE — PROGRESS NOTE ADULT - SUBJECTIVE AND OBJECTIVE BOX
VASCULAR SURGERY PROGRESS NOTE  Post-Op Day #2    Procedure: s/p RT CEA    Events of past 24 hours: No acute event overnight, patient doing well and comfortable. Surgical site has mild swelling which is not progressing, no soakage.     12 point ROS otherwise negative except per subjective and HPI      PAST MEDICAL & SURGICAL HISTORY:  Macular degeneration  CAD (coronary artery disease) of bypass graft  Hypothyroid  HTN (hypertension)  Glaucoma  CAD (coronary artery disease)  History of hip replacement, total, right  S/P partial thyroidectomy  S/P cholecystectomy  CAD (coronary artery disease) of bypass graft: 3v avr      Vital Signs Last 24 Hrs  T(C): 36.3 (08 Aug 2018 00:00), Max: 36.9 (07 Aug 2018 16:25)  T(F): 97.3 (08 Aug 2018 00:00), Max: 98.4 (07 Aug 2018 16:25)  HR: 63 (08 Aug 2018 00:00) (58 - 92)  BP: 110/53 (08 Aug 2018 00:00) (92/52 - 110/53)  BP(mean): 66 (07 Aug 2018 08:45) (66 - 66)  RR: 18 (08 Aug 2018 00:00) (13 - 29)  SpO2: 95% (07 Aug 2018 12:00) (92% - 100%)    Pain control- Adequate    Diet:    I&O's Detail    06 Aug 2018 07:01  -  07 Aug 2018 07:00  --------------------------------------------------------  IN:    lactated ringers.: 1200 mL    lactated ringers.: 100 mL    lactated ringers.: 200 mL    lactated ringers.: 100 mL    Oral Fluid: 60 mL  Total IN: 1660 mL    OUT:    Bulb: 25 mL    Voided: 400 mL  Total OUT: 425 mL    Total NET: 1235 mL      07 Aug 2018 07:01  -  08 Aug 2018 06:10  --------------------------------------------------------  IN:    lactated ringers.: 225 mL    Oral Fluid: 740 mL  Total IN: 965 mL    OUT:    Bulb: 25 mL    Voided: 400 mL  Total OUT: 425 mL    Total NET: 540 mL          Bowel Movement: : NO  Flatus: : [] YES     PHYSICAL EXAM    Appearance: Normal	  HEENT:   Normal oral mucosa, PERRL, EOMI	  Neck: Supple, - JVD; Carotid Bruit   Cardiovascular: Normal S1 S2, No JVD, No murmurs,   Respiratory: Lungs clear to auscultation/Decreased Breath Sounds/No Rales, Rhonchi, Wheezing	  Gastrointestinal:  Soft, Non-tender, + BS	  Skin: No rashes, No ecchymoses, No cyanosis  Extremities: Normal range of motion, No clubbing, cyanosis or edema  Neurologic: Non-focal  Psychiatry: A & O x 3, Mood & affect appropriate    PULSES:  Femoral:palp  Popliteal:palp  Dorsal Pedal:palp  Posterior Tibial:palp        MEDICATIONS:   MEDICATIONS  (STANDING):  aspirin enteric coated 81 milliGRAM(s) Oral daily  atorvastatin 80 milliGRAM(s) Oral at bedtime  brimonidine 0.2% Ophthalmic Solution 1 Drop(s) Both EYES three times a day  latanoprost 0.005% Ophthalmic Solution 1 Drop(s) Both EYES at bedtime  levothyroxine 125 MICROGram(s) Oral daily  losartan 100 milliGRAM(s) Oral daily  metoprolol succinate ER 25 milliGRAM(s) Oral daily  senna 2 Tablet(s) Oral at bedtime    MEDICATIONS  (PRN):  acetaminophen   Tablet. 650 milliGRAM(s) Oral every 4 hours PRN Mild Pain (1 - 3)  metoprolol tartrate Injectable 5 milliGRAM(s) IV Push every 4 hours PRN SBP > 150 mmHg  oxyCODONE    IR 10 milliGRAM(s) Oral every 6 hours PRN Severe Pain (7 - 10)  oxyCODONE    IR 5 milliGRAM(s) Oral every 4 hours PRN Mild Pain (1 - 3)      DVT PROPHYLAXIS: [] YES [] NO  GI PROPHYLAXIS: [] YES [] NO  ANTIPLATELETS: [] YES [] NO  ANTICOAGULATION: [] YES [] NO  ANTIBIOTICS: [] YES [] NO    LAB/STUDIES:                        9.3    12.67 )-----------( 168      ( 08 Aug 2018 01:18 )             27.5     08-08    138  |  99  |  25<H>  ----------------------------<  127<H>  4.3   |  27  |  1.1    Ca    8.5      08 Aug 2018 01:18  Phos  4.1     08-08  Mg     2.1     08-08      PT/INR - ( 06 Aug 2018 14:10 )   PT: 12.40 sec;   INR: 1.15 ratio         PTT - ( 06 Aug 2018 14:10 )  PTT:>200.0 sec        CARDIAC MARKERS ( 07 Aug 2018 08:00 )  x     / <0.01 ng/mL / 132 U/L / x     / 2.9 ng/mL  CARDIAC MARKERS ( 07 Aug 2018 02:03 )  x     / <0.01 ng/mL / 129 U/L / x     / 2.9 ng/mL  CARDIAC MARKERS ( 06 Aug 2018 14:10 )  x     / <0.01 ng/mL / 74 U/L / x     / x              ASSESSMENT/PLAN:  78y/F s/p Rt CEA, doing well .  Plan:  1.ambulate  2.possible discharge to home.    [id number]

## 2018-08-09 LAB — SURGICAL PATHOLOGY STUDY: SIGNIFICANT CHANGE UP

## 2018-08-21 ENCOUNTER — APPOINTMENT (OUTPATIENT)
Dept: VASCULAR SURGERY | Facility: CLINIC | Age: 78
End: 2018-08-21
Payer: COMMERCIAL

## 2018-08-21 DIAGNOSIS — Z87.891 PERSONAL HISTORY OF NICOTINE DEPENDENCE: ICD-10-CM

## 2018-08-21 PROCEDURE — 99024 POSTOP FOLLOW-UP VISIT: CPT

## 2018-08-23 DIAGNOSIS — Z91.018 ALLERGY TO OTHER FOODS: ICD-10-CM

## 2018-08-23 DIAGNOSIS — Z96.641 PRESENCE OF RIGHT ARTIFICIAL HIP JOINT: ICD-10-CM

## 2018-08-23 DIAGNOSIS — H35.30 UNSPECIFIED MACULAR DEGENERATION: ICD-10-CM

## 2018-08-23 DIAGNOSIS — H40.9 UNSPECIFIED GLAUCOMA: ICD-10-CM

## 2018-08-23 DIAGNOSIS — I65.21 OCCLUSION AND STENOSIS OF RIGHT CAROTID ARTERY: ICD-10-CM
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
## 2018-08-23 DIAGNOSIS — Z88.1 ALLERGY STATUS TO OTHER ANTIBIOTIC AGENTS STATUS: ICD-10-CM

## 2018-08-23 DIAGNOSIS — I65.29 OCCLUSION AND STENOSIS OF UNSPECIFIED CAROTID ARTERY: ICD-10-CM

## 2018-08-23 DIAGNOSIS — Z91.040 LATEX ALLERGY STATUS: ICD-10-CM

## 2018-08-23 DIAGNOSIS — Z88.0 ALLERGY STATUS TO PENICILLIN: ICD-10-CM

## 2018-08-23 DIAGNOSIS — I25.810 ATHEROSCLEROSIS OF CORONARY ARTERY BYPASS GRAFT(S) WITHOUT ANGINA PECTORIS: ICD-10-CM

## 2018-08-23 DIAGNOSIS — I10 ESSENTIAL (PRIMARY) HYPERTENSION: ICD-10-CM

## 2018-08-23 DIAGNOSIS — Z79.82 LONG TERM (CURRENT) USE OF ASPIRIN: ICD-10-CM

## 2018-08-23 DIAGNOSIS — I25.10 ATHEROSCLEROTIC HEART DISEASE OF NATIVE CORONARY ARTERY WITHOUT ANGINA PECTORIS: ICD-10-CM

## 2018-08-23 DIAGNOSIS — I97.638 POSTPROCEDURAL HEMATOMA OF A CIRCULATORY SYSTEM ORGAN OR STRUCTURE FOLLOWING OTHER CIRCULATORY SYSTEM PROCEDURE: ICD-10-CM

## 2018-08-23 DIAGNOSIS — E89.0 POSTPROCEDURAL HYPOTHYROIDISM: ICD-10-CM

## 2018-09-11 ENCOUNTER — APPOINTMENT (OUTPATIENT)
Dept: VASCULAR SURGERY | Facility: CLINIC | Age: 78
End: 2018-09-11
Payer: COMMERCIAL

## 2018-09-11 PROCEDURE — 93880 EXTRACRANIAL BILAT STUDY: CPT

## 2018-09-11 PROCEDURE — 99024 POSTOP FOLLOW-UP VISIT: CPT

## 2018-09-18 LAB
BUN SERPL-MCNC: 30 MG/DL
CREAT SERPL-MCNC: 1.2 MG/DL

## 2018-10-05 NOTE — CHART NOTE - NSCHARTNOTEFT_GEN_A_CORE
PACU ANESTHESIA ADMISSION NOTE      Procedure: Neck hematoma drainage post  Carotid endarterectomy    Post op diagnosis:  Hematoma of neck, sequela  Carotid stenosis, asymptomatic, right      ____  Intubated  TV:______       Rate: ______      FiO2: ______    _x___  Patent Airway    _x___  Full return of protective reflexes    _x___  Full recovery from anesthesia / back to baseline status    Vitals:    see anesthesia chart    Mental Status:  _x___ Awake   _____ Alert   _____ Drowsy   _____ Sedated    Nausea/Vomiting:  _x___  NO       ______Yes,   See Post - Op Orders         Pain Scale (0-10):  _____    Treatment: ____ None    __x__ See Post - Op/PCA Orders    Post - Operative Fluids:   ____ Oral   ___x_ See Post - Op Orders    Plan: Discharge:   ____Home       _____Floor     ___x__Critical Care    _____  Other:_________________    Comments:  No anesthesia issues or complications noted.  Discharge when criteria met. alert

## 2018-10-16 ENCOUNTER — APPOINTMENT (OUTPATIENT)
Dept: VASCULAR SURGERY | Facility: CLINIC | Age: 78
End: 2018-10-16
Payer: COMMERCIAL

## 2018-10-16 PROCEDURE — 93880 EXTRACRANIAL BILAT STUDY: CPT

## 2018-10-16 PROCEDURE — 99024 POSTOP FOLLOW-UP VISIT: CPT

## 2018-10-22 ENCOUNTER — APPOINTMENT (OUTPATIENT)
Dept: OTOLARYNGOLOGY | Facility: CLINIC | Age: 78
End: 2018-10-22
Payer: COMMERCIAL

## 2018-10-22 VITALS
WEIGHT: 168 LBS | BODY MASS INDEX: 31.72 KG/M2 | HEIGHT: 61 IN | DIASTOLIC BLOOD PRESSURE: 80 MMHG | SYSTOLIC BLOOD PRESSURE: 124 MMHG

## 2018-10-22 DIAGNOSIS — R49.0 DYSPHONIA: ICD-10-CM

## 2018-10-22 PROCEDURE — 31575 DIAGNOSTIC LARYNGOSCOPY: CPT

## 2018-10-22 PROCEDURE — 99203 OFFICE O/P NEW LOW 30 MIN: CPT | Mod: 25

## 2018-10-28 ENCOUNTER — FORM ENCOUNTER (OUTPATIENT)
Age: 78
End: 2018-10-28

## 2018-10-29 ENCOUNTER — OUTPATIENT (OUTPATIENT)
Dept: OUTPATIENT SERVICES | Facility: HOSPITAL | Age: 78
LOS: 1 days | Discharge: HOME | End: 2018-10-29

## 2018-10-29 VITALS
WEIGHT: 166.01 LBS | HEART RATE: 60 BPM | SYSTOLIC BLOOD PRESSURE: 100 MMHG | DIASTOLIC BLOOD PRESSURE: 58 MMHG | RESPIRATION RATE: 16 BRPM | HEIGHT: 62 IN | TEMPERATURE: 97 F | OXYGEN SATURATION: 99 %

## 2018-10-29 DIAGNOSIS — I10 ESSENTIAL (PRIMARY) HYPERTENSION: ICD-10-CM

## 2018-10-29 DIAGNOSIS — E89.0 POSTPROCEDURAL HYPOTHYROIDISM: Chronic | ICD-10-CM

## 2018-10-29 DIAGNOSIS — Z01.818 ENCOUNTER FOR OTHER PREPROCEDURAL EXAMINATION: ICD-10-CM

## 2018-10-29 DIAGNOSIS — I65.23 OCCLUSION AND STENOSIS OF BILATERAL CAROTID ARTERIES: ICD-10-CM

## 2018-10-29 DIAGNOSIS — Z96.641 PRESENCE OF RIGHT ARTIFICIAL HIP JOINT: Chronic | ICD-10-CM

## 2018-10-29 DIAGNOSIS — Z90.49 ACQUIRED ABSENCE OF OTHER SPECIFIED PARTS OF DIGESTIVE TRACT: Chronic | ICD-10-CM

## 2018-10-29 DIAGNOSIS — I25.810 ATHEROSCLEROSIS OF CORONARY ARTERY BYPASS GRAFT(S) WITHOUT ANGINA PECTORIS: Chronic | ICD-10-CM

## 2018-10-29 DIAGNOSIS — E01.8 OTHER IODINE-DEFICIENCY RELATED THYROID DISORDERS AND ALLIED CONDITIONS: ICD-10-CM

## 2018-10-29 DIAGNOSIS — Z98.890 OTHER SPECIFIED POSTPROCEDURAL STATES: Chronic | ICD-10-CM

## 2018-10-29 DIAGNOSIS — Z95.5 PRESENCE OF CORONARY ANGIOPLASTY IMPLANT AND GRAFT: ICD-10-CM

## 2018-10-29 DIAGNOSIS — I25.10 ATHEROSCLEROTIC HEART DISEASE OF NATIVE CORONARY ARTERY WITHOUT ANGINA PECTORIS: ICD-10-CM

## 2018-10-29 DIAGNOSIS — H40.9 UNSPECIFIED GLAUCOMA: ICD-10-CM

## 2018-10-29 LAB
ALBUMIN SERPL ELPH-MCNC: 4 G/DL — SIGNIFICANT CHANGE UP (ref 3.5–5.2)
ALP SERPL-CCNC: 75 U/L — SIGNIFICANT CHANGE UP (ref 30–115)
ALT FLD-CCNC: 15 U/L — SIGNIFICANT CHANGE UP (ref 0–41)
ANION GAP SERPL CALC-SCNC: 9 MMOL/L — SIGNIFICANT CHANGE UP (ref 7–14)
APPEARANCE UR: CLEAR — SIGNIFICANT CHANGE UP
APTT BLD: 34.1 SEC — SIGNIFICANT CHANGE UP (ref 27–39.2)
AST SERPL-CCNC: 17 U/L — SIGNIFICANT CHANGE UP (ref 0–41)
BACTERIA # UR AUTO: ABNORMAL /HPF
BASOPHILS # BLD AUTO: 0.06 K/UL — SIGNIFICANT CHANGE UP (ref 0–0.2)
BASOPHILS NFR BLD AUTO: 0.8 % — SIGNIFICANT CHANGE UP (ref 0–1)
BILIRUB SERPL-MCNC: 0.2 MG/DL — SIGNIFICANT CHANGE UP (ref 0.2–1.2)
BILIRUB UR-MCNC: NEGATIVE — SIGNIFICANT CHANGE UP
BLD GP AB SCN SERPL QL: SIGNIFICANT CHANGE UP
BUN SERPL-MCNC: 28 MG/DL — HIGH (ref 10–20)
CALCIUM SERPL-MCNC: 9.1 MG/DL — SIGNIFICANT CHANGE UP (ref 8.5–10.1)
CHLORIDE SERPL-SCNC: 99 MMOL/L — SIGNIFICANT CHANGE UP (ref 98–110)
CO2 SERPL-SCNC: 28 MMOL/L — SIGNIFICANT CHANGE UP (ref 17–32)
COLOR SPEC: YELLOW — SIGNIFICANT CHANGE UP
CREAT SERPL-MCNC: 1 MG/DL — SIGNIFICANT CHANGE UP (ref 0.7–1.5)
DIFF PNL FLD: ABNORMAL
EOSINOPHIL # BLD AUTO: 0.38 K/UL — SIGNIFICANT CHANGE UP (ref 0–0.7)
EOSINOPHIL NFR BLD AUTO: 5.1 % — SIGNIFICANT CHANGE UP (ref 0–8)
EPI CELLS # UR: ABNORMAL /HPF
GLUCOSE SERPL-MCNC: 101 MG/DL — HIGH (ref 70–99)
GLUCOSE UR QL: NEGATIVE MG/DL — SIGNIFICANT CHANGE UP
HCT VFR BLD CALC: 35.2 % — LOW (ref 37–47)
HGB BLD-MCNC: 11.6 G/DL — LOW (ref 12–16)
IMM GRANULOCYTES NFR BLD AUTO: 0.4 % — HIGH (ref 0.1–0.3)
INR BLD: 0.88 RATIO — SIGNIFICANT CHANGE UP (ref 0.65–1.3)
KETONES UR-MCNC: NEGATIVE — SIGNIFICANT CHANGE UP
LEUKOCYTE ESTERASE UR-ACNC: ABNORMAL
LYMPHOCYTES # BLD AUTO: 2.08 K/UL — SIGNIFICANT CHANGE UP (ref 1.2–3.4)
LYMPHOCYTES # BLD AUTO: 28.1 % — SIGNIFICANT CHANGE UP (ref 20.5–51.1)
MCHC RBC-ENTMCNC: 29.5 PG — SIGNIFICANT CHANGE UP (ref 27–31)
MCHC RBC-ENTMCNC: 33 G/DL — SIGNIFICANT CHANGE UP (ref 32–37)
MCV RBC AUTO: 89.6 FL — SIGNIFICANT CHANGE UP (ref 81–99)
MONOCYTES # BLD AUTO: 0.52 K/UL — SIGNIFICANT CHANGE UP (ref 0.1–0.6)
MONOCYTES NFR BLD AUTO: 7 % — SIGNIFICANT CHANGE UP (ref 1.7–9.3)
NEUTROPHILS # BLD AUTO: 4.32 K/UL — SIGNIFICANT CHANGE UP (ref 1.4–6.5)
NEUTROPHILS NFR BLD AUTO: 58.6 % — SIGNIFICANT CHANGE UP (ref 42.2–75.2)
NITRITE UR-MCNC: NEGATIVE — SIGNIFICANT CHANGE UP
NRBC # BLD: 0 /100 WBCS — SIGNIFICANT CHANGE UP (ref 0–0)
PH UR: 6 — SIGNIFICANT CHANGE UP (ref 5–8)
PLATELET # BLD AUTO: 269 K/UL — SIGNIFICANT CHANGE UP (ref 130–400)
POTASSIUM SERPL-MCNC: 4.6 MMOL/L — SIGNIFICANT CHANGE UP (ref 3.5–5)
POTASSIUM SERPL-SCNC: 4.6 MMOL/L — SIGNIFICANT CHANGE UP (ref 3.5–5)
PROT SERPL-MCNC: 6.9 G/DL — SIGNIFICANT CHANGE UP (ref 6–8)
PROT UR-MCNC: NEGATIVE MG/DL — SIGNIFICANT CHANGE UP
PROTHROM AB SERPL-ACNC: 10.1 SEC — SIGNIFICANT CHANGE UP (ref 9.95–12.87)
RBC # BLD: 3.93 M/UL — LOW (ref 4.2–5.4)
RBC # FLD: 12.4 % — SIGNIFICANT CHANGE UP (ref 11.5–14.5)
RBC CASTS # UR COMP ASSIST: ABNORMAL /HPF
SODIUM SERPL-SCNC: 136 MMOL/L — SIGNIFICANT CHANGE UP (ref 135–146)
SP GR SPEC: 1.02 — SIGNIFICANT CHANGE UP (ref 1.01–1.03)
TYPE + AB SCN PNL BLD: SIGNIFICANT CHANGE UP
UROBILINOGEN FLD QL: 0.2 MG/DL — SIGNIFICANT CHANGE UP (ref 0.2–0.2)
WBC # BLD: 7.39 K/UL — SIGNIFICANT CHANGE UP (ref 4.8–10.8)
WBC # FLD AUTO: 7.39 K/UL — SIGNIFICANT CHANGE UP (ref 4.8–10.8)
WBC UR QL: SIGNIFICANT CHANGE UP /HPF

## 2018-10-29 RX ORDER — BRIMONIDINE TARTRATE 2 MG/MG
1 SOLUTION/ DROPS OPHTHALMIC
Qty: 0 | Refills: 0 | COMMUNITY

## 2018-10-29 NOTE — H&P PST ADULT - PMH
CAD (coronary artery disease)    CAD (coronary artery disease) of bypass graft    Glaucoma    HTN (hypertension)    Hypothyroid    Macular degeneration Asthma    CAD (coronary artery disease)    CAD (coronary artery disease) of bypass graft  + AVR pig  Glaucoma    HTN (hypertension)    Hypothyroid    Macular degeneration    Obstructive sleep apnea on CPAP Asthma  cant remember last flare up  CAD (coronary artery disease)    CAD (coronary artery disease) of bypass graft  + AVR pig  Glaucoma    HTN (hypertension)    Hypothyroid    Macular degeneration    Obstructive sleep apnea on CPAP

## 2018-10-29 NOTE — H&P PST ADULT - REASON FOR ADMISSION
pt for left CEA pt for left CEA-pt reports 80% occlusion  previously had right CEA 9/2018   pt denies current cp,palp,cough,dysuria   can walk 2 fos and several blocks without sob, at time sob with more than 2fos--pt reports slight cold will be seeing her PMD for evaluation  pt states this is her  complete med/surg history

## 2018-11-12 ENCOUNTER — APPOINTMENT (OUTPATIENT)
Dept: VASCULAR SURGERY | Facility: HOSPITAL | Age: 78
End: 2018-11-12
Payer: COMMERCIAL

## 2018-11-12 ENCOUNTER — INPATIENT (INPATIENT)
Facility: HOSPITAL | Age: 78
LOS: 0 days | Discharge: HOME | End: 2018-11-13
Attending: SURGERY | Admitting: SURGERY
Payer: COMMERCIAL

## 2018-11-12 ENCOUNTER — RESULT REVIEW (OUTPATIENT)
Age: 78
End: 2018-11-12

## 2018-11-12 VITALS
DIASTOLIC BLOOD PRESSURE: 68 MMHG | WEIGHT: 166.01 LBS | TEMPERATURE: 98 F | SYSTOLIC BLOOD PRESSURE: 131 MMHG | HEART RATE: 62 BPM | HEIGHT: 61.75 IN | RESPIRATION RATE: 18 BRPM

## 2018-11-12 DIAGNOSIS — I25.810 ATHEROSCLEROSIS OF CORONARY ARTERY BYPASS GRAFT(S) WITHOUT ANGINA PECTORIS: Chronic | ICD-10-CM

## 2018-11-12 DIAGNOSIS — Z90.49 ACQUIRED ABSENCE OF OTHER SPECIFIED PARTS OF DIGESTIVE TRACT: Chronic | ICD-10-CM

## 2018-11-12 DIAGNOSIS — Z96.641 PRESENCE OF RIGHT ARTIFICIAL HIP JOINT: Chronic | ICD-10-CM

## 2018-11-12 DIAGNOSIS — E89.0 POSTPROCEDURAL HYPOTHYROIDISM: Chronic | ICD-10-CM

## 2018-11-12 DIAGNOSIS — Z98.890 OTHER SPECIFIED POSTPROCEDURAL STATES: Chronic | ICD-10-CM

## 2018-11-12 LAB
ANION GAP SERPL CALC-SCNC: 13 MMOL/L — SIGNIFICANT CHANGE UP (ref 7–14)
ANION GAP SERPL CALC-SCNC: 15 MMOL/L — HIGH (ref 7–14)
APTT BLD: 165.9 SEC — CRITICAL HIGH (ref 27–39.2)
BASE EXCESS BLDA CALC-SCNC: 4.3 MMOL/L — HIGH (ref -2–2)
BASOPHILS # BLD AUTO: 0.05 K/UL — SIGNIFICANT CHANGE UP (ref 0–0.2)
BASOPHILS NFR BLD AUTO: 0.7 % — SIGNIFICANT CHANGE UP (ref 0–1)
BUN SERPL-MCNC: 25 MG/DL — HIGH (ref 10–20)
BUN SERPL-MCNC: 25 MG/DL — HIGH (ref 10–20)
CALCIUM SERPL-MCNC: 8.5 MG/DL — SIGNIFICANT CHANGE UP (ref 8.5–10.1)
CALCIUM SERPL-MCNC: 8.6 MG/DL — SIGNIFICANT CHANGE UP (ref 8.5–10.1)
CHLORIDE SERPL-SCNC: 96 MMOL/L — LOW (ref 98–110)
CHLORIDE SERPL-SCNC: 99 MMOL/L — SIGNIFICANT CHANGE UP (ref 98–110)
CK MB CFR SERPL CALC: 2.1 NG/ML — SIGNIFICANT CHANGE UP (ref 0.6–6.3)
CK MB CFR SERPL CALC: 2.2 NG/ML — SIGNIFICANT CHANGE UP (ref 0.6–6.3)
CK SERPL-CCNC: 68 U/L — SIGNIFICANT CHANGE UP (ref 0–225)
CK SERPL-CCNC: 70 U/L — SIGNIFICANT CHANGE UP (ref 0–225)
CO2 SERPL-SCNC: 27 MMOL/L — SIGNIFICANT CHANGE UP (ref 17–32)
CO2 SERPL-SCNC: 27 MMOL/L — SIGNIFICANT CHANGE UP (ref 17–32)
CREAT SERPL-MCNC: 0.9 MG/DL — SIGNIFICANT CHANGE UP (ref 0.7–1.5)
CREAT SERPL-MCNC: 1 MG/DL — SIGNIFICANT CHANGE UP (ref 0.7–1.5)
EOSINOPHIL # BLD AUTO: 0.39 K/UL — SIGNIFICANT CHANGE UP (ref 0–0.7)
EOSINOPHIL NFR BLD AUTO: 5.5 % — SIGNIFICANT CHANGE UP (ref 0–8)
GLUCOSE SERPL-MCNC: 108 MG/DL — HIGH (ref 70–99)
GLUCOSE SERPL-MCNC: 93 MG/DL — SIGNIFICANT CHANGE UP (ref 70–99)
HCO3 BLDA-SCNC: 29 MMOL/L — HIGH (ref 23–27)
HCT VFR BLD CALC: 29.7 % — LOW (ref 37–47)
HGB BLD-MCNC: 9.8 G/DL — LOW (ref 12–16)
IMM GRANULOCYTES NFR BLD AUTO: 0.3 % — SIGNIFICANT CHANGE UP (ref 0.1–0.3)
INR BLD: 1.06 RATIO — SIGNIFICANT CHANGE UP (ref 0.65–1.3)
LYMPHOCYTES # BLD AUTO: 2.31 K/UL — SIGNIFICANT CHANGE UP (ref 1.2–3.4)
LYMPHOCYTES # BLD AUTO: 32.5 % — SIGNIFICANT CHANGE UP (ref 20.5–51.1)
MAGNESIUM SERPL-MCNC: 1.9 MG/DL — SIGNIFICANT CHANGE UP (ref 1.8–2.4)
MCHC RBC-ENTMCNC: 29 PG — SIGNIFICANT CHANGE UP (ref 27–31)
MCHC RBC-ENTMCNC: 33 G/DL — SIGNIFICANT CHANGE UP (ref 32–37)
MCV RBC AUTO: 87.9 FL — SIGNIFICANT CHANGE UP (ref 81–99)
MONOCYTES # BLD AUTO: 0.44 K/UL — SIGNIFICANT CHANGE UP (ref 0.1–0.6)
MONOCYTES NFR BLD AUTO: 6.2 % — SIGNIFICANT CHANGE UP (ref 1.7–9.3)
NEUTROPHILS # BLD AUTO: 3.89 K/UL — SIGNIFICANT CHANGE UP (ref 1.4–6.5)
NEUTROPHILS NFR BLD AUTO: 54.8 % — SIGNIFICANT CHANGE UP (ref 42.2–75.2)
PCO2 BLDA: 46 MMHG — HIGH (ref 38–42)
PH BLDA: 7.42 — SIGNIFICANT CHANGE UP (ref 7.38–7.42)
PHOSPHATE SERPL-MCNC: 5 MG/DL — HIGH (ref 2.1–4.9)
PLATELET # BLD AUTO: 209 K/UL — SIGNIFICANT CHANGE UP (ref 130–400)
PO2 BLDA: 135 MMHG — HIGH (ref 78–95)
POTASSIUM SERPL-MCNC: 3.5 MMOL/L — SIGNIFICANT CHANGE UP (ref 3.5–5)
POTASSIUM SERPL-MCNC: 4.8 MMOL/L — SIGNIFICANT CHANGE UP (ref 3.5–5)
POTASSIUM SERPL-SCNC: 3.5 MMOL/L — SIGNIFICANT CHANGE UP (ref 3.5–5)
POTASSIUM SERPL-SCNC: 4.8 MMOL/L — SIGNIFICANT CHANGE UP (ref 3.5–5)
PROTHROM AB SERPL-ACNC: 12.2 SEC — SIGNIFICANT CHANGE UP (ref 9.95–12.87)
RBC # BLD: 3.38 M/UL — LOW (ref 4.2–5.4)
RBC # FLD: 12.6 % — SIGNIFICANT CHANGE UP (ref 11.5–14.5)
SAO2 % BLDA: 99 % — HIGH (ref 94–98)
SODIUM SERPL-SCNC: 136 MMOL/L — SIGNIFICANT CHANGE UP (ref 135–146)
SODIUM SERPL-SCNC: 141 MMOL/L — SIGNIFICANT CHANGE UP (ref 135–146)
TROPONIN T SERPL-MCNC: <0.01 NG/ML — SIGNIFICANT CHANGE UP
TROPONIN T SERPL-MCNC: <0.01 NG/ML — SIGNIFICANT CHANGE UP
WBC # BLD: 7.1 K/UL — SIGNIFICANT CHANGE UP (ref 4.8–10.8)
WBC # FLD AUTO: 7.1 K/UL — SIGNIFICANT CHANGE UP (ref 4.8–10.8)

## 2018-11-12 PROCEDURE — 99222 1ST HOSP IP/OBS MODERATE 55: CPT

## 2018-11-12 PROCEDURE — 35301 RECHANNELING OF ARTERY: CPT | Mod: LT

## 2018-11-12 RX ORDER — POTASSIUM CHLORIDE 20 MEQ
20 PACKET (EA) ORAL
Qty: 0 | Refills: 0 | Status: COMPLETED | OUTPATIENT
Start: 2018-11-12 | End: 2018-11-12

## 2018-11-12 RX ORDER — BRIMONIDINE TARTRATE 2 MG/MG
1 SOLUTION/ DROPS OPHTHALMIC
Qty: 0 | Refills: 0 | Status: DISCONTINUED | OUTPATIENT
Start: 2018-11-12 | End: 2018-11-13

## 2018-11-12 RX ORDER — LEVOTHYROXINE SODIUM 125 MCG
125 TABLET ORAL DAILY
Qty: 0 | Refills: 0 | Status: DISCONTINUED | OUTPATIENT
Start: 2018-11-12 | End: 2018-11-13

## 2018-11-12 RX ORDER — ASPIRIN/CALCIUM CARB/MAGNESIUM 324 MG
81 TABLET ORAL DAILY
Qty: 0 | Refills: 0 | Status: DISCONTINUED | OUTPATIENT
Start: 2018-11-12 | End: 2018-11-13

## 2018-11-12 RX ORDER — METOPROLOL TARTRATE 50 MG
25 TABLET ORAL DAILY
Qty: 0 | Refills: 0 | Status: DISCONTINUED | OUTPATIENT
Start: 2018-11-12 | End: 2018-11-13

## 2018-11-12 RX ORDER — TAMSULOSIN HYDROCHLORIDE 0.4 MG/1
0.4 CAPSULE ORAL AT BEDTIME
Qty: 0 | Refills: 0 | Status: DISCONTINUED | OUTPATIENT
Start: 2018-11-12 | End: 2018-11-13

## 2018-11-12 RX ORDER — OXYCODONE HYDROCHLORIDE 5 MG/1
5 TABLET ORAL EVERY 4 HOURS
Qty: 0 | Refills: 0 | Status: DISCONTINUED | OUTPATIENT
Start: 2018-11-12 | End: 2018-11-13

## 2018-11-12 RX ORDER — SODIUM CHLORIDE 9 MG/ML
1000 INJECTION, SOLUTION INTRAVENOUS
Qty: 0 | Refills: 0 | Status: DISCONTINUED | OUTPATIENT
Start: 2018-11-12 | End: 2018-11-12

## 2018-11-12 RX ORDER — ACETAMINOPHEN 500 MG
650 TABLET ORAL EVERY 4 HOURS
Qty: 0 | Refills: 0 | Status: DISCONTINUED | OUTPATIENT
Start: 2018-11-12 | End: 2018-11-13

## 2018-11-12 RX ORDER — DOCUSATE SODIUM 100 MG
100 CAPSULE ORAL EVERY 8 HOURS
Qty: 0 | Refills: 0 | Status: DISCONTINUED | OUTPATIENT
Start: 2018-11-12 | End: 2018-11-13

## 2018-11-12 RX ORDER — LOSARTAN POTASSIUM 100 MG/1
100 TABLET, FILM COATED ORAL DAILY
Qty: 0 | Refills: 0 | Status: DISCONTINUED | OUTPATIENT
Start: 2018-11-12 | End: 2018-11-13

## 2018-11-12 RX ORDER — ACETAMINOPHEN 500 MG
1000 TABLET ORAL ONCE
Qty: 0 | Refills: 0 | Status: COMPLETED | OUTPATIENT
Start: 2018-11-12 | End: 2018-11-12

## 2018-11-12 RX ORDER — MORPHINE SULFATE 50 MG/1
2 CAPSULE, EXTENDED RELEASE ORAL
Qty: 0 | Refills: 0 | Status: DISCONTINUED | OUTPATIENT
Start: 2018-11-12 | End: 2018-11-12

## 2018-11-12 RX ORDER — ATORVASTATIN CALCIUM 80 MG/1
80 TABLET, FILM COATED ORAL AT BEDTIME
Qty: 0 | Refills: 0 | Status: DISCONTINUED | OUTPATIENT
Start: 2018-11-12 | End: 2018-11-13

## 2018-11-12 RX ORDER — ONDANSETRON 8 MG/1
4 TABLET, FILM COATED ORAL ONCE
Qty: 0 | Refills: 0 | Status: DISCONTINUED | OUTPATIENT
Start: 2018-11-12 | End: 2018-11-12

## 2018-11-12 RX ORDER — LATANOPROST 0.05 MG/ML
1 SOLUTION/ DROPS OPHTHALMIC; TOPICAL AT BEDTIME
Qty: 0 | Refills: 0 | Status: DISCONTINUED | OUTPATIENT
Start: 2018-11-12 | End: 2018-11-13

## 2018-11-12 RX ORDER — SODIUM CHLORIDE 9 MG/ML
1000 INJECTION INTRAMUSCULAR; INTRAVENOUS; SUBCUTANEOUS
Qty: 0 | Refills: 0 | Status: DISCONTINUED | OUTPATIENT
Start: 2018-11-12 | End: 2018-11-13

## 2018-11-12 RX ADMIN — MORPHINE SULFATE 2 MILLIGRAM(S): 50 CAPSULE, EXTENDED RELEASE ORAL at 14:10

## 2018-11-12 RX ADMIN — LATANOPROST 1 DROP(S): 0.05 SOLUTION/ DROPS OPHTHALMIC; TOPICAL at 22:28

## 2018-11-12 RX ADMIN — Medication 400 MILLIGRAM(S): at 14:09

## 2018-11-12 RX ADMIN — ATORVASTATIN CALCIUM 80 MILLIGRAM(S): 80 TABLET, FILM COATED ORAL at 22:00

## 2018-11-12 RX ADMIN — Medication 100 MILLIGRAM(S): at 22:00

## 2018-11-12 RX ADMIN — SODIUM CHLORIDE 100 MILLILITER(S): 9 INJECTION, SOLUTION INTRAVENOUS at 14:44

## 2018-11-12 RX ADMIN — SODIUM CHLORIDE 100 MILLILITER(S): 9 INJECTION INTRAMUSCULAR; INTRAVENOUS; SUBCUTANEOUS at 15:12

## 2018-11-12 RX ADMIN — MORPHINE SULFATE 2 MILLIGRAM(S): 50 CAPSULE, EXTENDED RELEASE ORAL at 14:24

## 2018-11-12 RX ADMIN — TAMSULOSIN HYDROCHLORIDE 0.4 MILLIGRAM(S): 0.4 CAPSULE ORAL at 22:00

## 2018-11-12 RX ADMIN — MORPHINE SULFATE 2 MILLIGRAM(S): 50 CAPSULE, EXTENDED RELEASE ORAL at 14:47

## 2018-11-12 RX ADMIN — Medication 1000 MILLIGRAM(S): at 14:24

## 2018-11-12 RX ADMIN — Medication 50 MILLIEQUIVALENT(S): at 20:46

## 2018-11-12 RX ADMIN — MORPHINE SULFATE 2 MILLIGRAM(S): 50 CAPSULE, EXTENDED RELEASE ORAL at 14:29

## 2018-11-12 RX ADMIN — Medication 50 MILLIEQUIVALENT(S): at 16:10

## 2018-11-12 RX ADMIN — BRIMONIDINE TARTRATE 1 DROP(S): 2 SOLUTION/ DROPS OPHTHALMIC at 18:23

## 2018-11-12 NOTE — CHART NOTE - NSCHARTNOTEFT_GEN_A_CORE
GENERAL SURGERY PROGRESS NOTE     SHAE HENRIQUEZ  78y  Female  Hospital day :  POD:  Procedure: Carotid endarterectomy    Patient is doing well post operatively. Sterile dressing is in place and intact with mild sanguinous discharge. CN II-XII grossly intact. No swelling or erythema noted. Patient denies difficulty swallowing or breathing. Tolerating the liquids she has had thus far. Denies HA.    T(F): 98.6 (11-12-18 @ 14:34), Max: 98.6 (11-12-18 @ 14:34)  HR: 58 (11-12-18 @ 16:34) (58 - 82)  BP: 142/58 (11-12-18 @ 16:34) (104/41 - 162/90)  ABP: 149/59 (11-12-18 @ 14:34) (130/54 - 192/82)  ABP(mean): 91 (11-12-18 @ 14:34) (80 - 121)  RR: 13 (11-12-18 @ 16:34) (13 - 20)  SpO2: 98% (11-12-18 @ 16:34) (98% - 100%)    DIET/FLUIDS: lactated ringers. 1000 milliLiter(s) IV Continuous <Continuous>  potassium chloride  20 mEq/100 mL IVPB 20 milliEquivalent(s) IV Intermittent every 1 hour  sodium chloride 0.9%. 1000 milliLiter(s) IV Continuous <Continuous>    NG:                                                                                DRAINS:     BM:     EMESIS:     URINE:      GI proph:    AC/ proph: aspirin enteric coated 81 milliGRAM(s) Oral daily    ABx:       LABS  Labs:  CAPILLARY BLOOD GLUCOSE                              9.8    7.10  )-----------( 209      ( 12 Nov 2018 13:39 )             29.7       Auto Neutrophil %: 54.8 % (11-12-18 @ 13:39)  Auto Immature Granulocyte %: 0.3 % (11-12-18 @ 13:39)    11-12    136  |  96<L>  |  25<H>  ----------------------------<  93  3.5   |  27  |  0.9      Calcium, Total Serum: 8.6 mg/dL (11-12-18 @ 13:39)      LFTs:     Blood Gas Arterial, Lactate: 1.1 mmoL/L (11-12-18 @ 14:30)    ABG - ( 12 Nov 2018 14:30 )  pH: 7.42  /  pCO2: 46    /  pO2: 135   / HCO3: 29    / Base Excess: 4.3   /  SaO2: 99                Coags:     12.20  ----< 1.06    ( 12 Nov 2018 13:39 )     165.9       CARDIAC MARKERS ( 12 Nov 2018 13:39 )  x     / <0.01 ng/mL / 68 U/L / x     / 2.2 ng/mL            A/P  s/p L CEA  PLAN:   - ICU for q1 neuro checks    - clear diet this afternoon    - IS    - Monitor for HA, risk of reperfusion syndrome,    - goal sbp 100-140   - IS, ambulate   - ASA only

## 2018-11-12 NOTE — ASU PATIENT PROFILE, ADULT - PMH
Asthma  cant remember last flare up  CAD (coronary artery disease)    CAD (coronary artery disease) of bypass graft  + AVR pig  Glaucoma    HTN (hypertension)    Hypothyroid    Macular degeneration    Obstructive sleep apnea on CPAP

## 2018-11-12 NOTE — CHART NOTE - NSCHARTNOTEFT_GEN_A_CORE
PACU ANESTHESIA ADMISSION NOTE      Procedure: Carotid endarterectomy    Post op diagnosis:  Carotid stenosis, asymptomatic, left      ____  Intubated  TV:______       Rate: ______      FiO2: ______    ____  Patent Airway    ____  Full return of protective reflexes    ____  Full recovery from anesthesia / back to baseline     Vitals:   T:           R:                  BP:                  Sat:                   P:       Mental Status:  ____ Awake   _____ Alert   _____ Drowsy   _____ Sedated    Nausea/Vomiting:  ____ NO  ______Yes,   See Post - Op Orders          Pain Scale (0-10):  _____    Treatment: ____ None    ____ See Post - Op/PCA Orders    Post - Operative Fluids:   ____ Oral   ____ See Post - Op Orders    Plan: Discharge:   ____Home       _____Floor     _____Critical Care    _____  Other:_patient complains lot of pain and numbness at the arterial line site no vascular insufficiency discussed with dr muller and removed arterial line afterwards patient feels much better ________________    Comments:

## 2018-11-12 NOTE — CONSULT NOTE ADULT - ATTENDING COMMENTS
Assessment and plan above were modified and discussed with residents, physician assistants, and nurses.  I have provided 35  min of Critical Care to this patient.

## 2018-11-12 NOTE — CHART NOTE - NSCHARTNOTEFT_GEN_A_CORE
PACU ANESTHESIA ADMISSION NOTE      Procedure: Carotid endarterectomy    Post op diagnosis:  Carotid stenosis, asymptomatic, left      ____  Intubated  TV:______       Rate: ______      FiO2: ______    ____  Patent Airway    ____  Full return of protective reflexes    _x___  Full recovery from anesthesia / back to baseline     Vitals:   T: 97.4          R:   12               BP: 162/100                 Sat: 100                  P: 74  162/100    Mental Status:  _x___ Awake   _x____ Alert   _____ Drowsy   _____ Sedated    Nausea/Vomiting:  __x__ NO  ______Yes,   See Post - Op Orders          Pain Scale (0-10):  _____    Treatment: ____ None    __x__ See Post - Op/PCA Orders    Post - Operative Fluids:   ____ Oral   _x___ See Post - Op Orders    Plan: Discharge:   ____Home       _____Floor     _____Critical Care    _x____  Other:_alert and awake moving all four extremities no complications report given to I C U ________________    Comments:

## 2018-11-12 NOTE — CONSULT NOTE ADULT - SUBJECTIVE AND OBJECTIVE BOX
SICU Consultation Note  =====================================================  HPI:   78y female with a PMHx of CAD, stent placement 2010, DEXTER, hypothyroidism, HTN, asthma, and glaucoma found to have bilateral high grade stenosis on cardiology screening. Patient was asymptomatic. Patient came for elective R CEA in August 2018, patient had a hematoma after this procedure and was taken back to the OR for evacuation. Today she is s/p L CEA. Case was uneventful. She remained stable throughout the case. Patient will be upgraded to SICU for overnight neurovascular monitoring.     Surgery Information  OR time:  2.5 hours    EBL: 30cc      IV Fluids:1500cc               PAST MEDICAL & SURGICAL HISTORY:  Asthma: cant remember last flare up  Obstructive sleep apnea on CPAP  Macular degeneration  CAD (coronary artery disease) of bypass graft: + AVR pig  Hypothyroid  HTN (hypertension)  Glaucoma  CAD (coronary artery disease)  History of surgery: Right CEA 9-18  per pt complication post op bleeding  History of hip replacement, total, right  S/P partial thyroidectomy  S/P cholecystectomy  CAD (coronary artery disease) of bypass graft: 3v avr      Allergies  erythromycin (Rash)  latex (Rash)  parsley (Headache)  penicillins (Rash)      SH:  Home Medications:  Alphagan 0.2% ophthalmic solution: 1 drop(s) to each affected eye 2 times a day (12 Nov 2018 09:46)  aspirin 81 mg oral tablet: 1 tab(s) orally once a day (12 Nov 2018 09:46)  Crestor 20 mg oral tablet: 1 tab(s) orally once a day (at bedtime) (12 Nov 2018 09:46)  levothyroxine 125 mcg (0.125 mg) oral tablet: 1 tab(s) orally once a day (12 Nov 2018 09:46)  metoprolol succinate 25 mg oral tablet, extended release: 1 tab(s) orally once a day (12 Nov 2018 09:46)  valsartan 160 mg oral tablet: 1 tab(s) orally once a day (12 Nov 2018 09:46)  Xalatan 0.005% ophthalmic solution: 1 drop(s) to each affected eye once a day (in the evening) (12 Nov 2018 09:46)    Advanced Directives: Full Code     ROS:  [x] A ten-point review of systems was otherwise negative except as noted.        CURRENT MEDICATIONS:   --------------------------------------------------------------------------------------  Neurologic Medications  acetaminophen   Tablet .. 650 milliGRAM(s) Oral every 4 hours PRN Mild Pain (1 - 3)  morphine  - Injectable 2 milliGRAM(s) IV Push every 10 minutes PRN Severe Pain (7 - 10)  ondansetron Injectable 4 milliGRAM(s) IV Push once PRN Nausea and/or Vomiting  oxyCODONE    IR 5 milliGRAM(s) Oral every 4 hours PRN mod-severe pain      Cardiovascular Medications  losartan 100 milliGRAM(s) Oral daily  metoprolol succinate ER 25 milliGRAM(s) Oral daily    Gastrointestinal Medications  docusate sodium 100 milliGRAM(s) Oral every 8 hours  lactated ringers. 1000 milliLiter(s) IV Continuous <Continuous>  sodium chloride 0.9%. 1000 milliLiter(s) IV Continuous <Continuous>      Hematologic/Oncologic Medications  aspirin enteric coated 81 milliGRAM(s) Oral daily      Endocrine/Metabolic Medications  atorvastatin 80 milliGRAM(s) Oral at bedtime  levothyroxine 125 MICROGram(s) Oral daily    Topical/Other Medications  brimonidine 0.2% Ophthalmic Solution 1 Drop(s) Both EYES two times a day  latanoprost 0.005% Ophthalmic Solution 1 Drop(s) Both EYES at bedtime    --------------------------------------------------------------------------------------    Vital Signs Last 24 Hrs  T(C): 36.5 (12 Nov 2018 13:34), Max: 36.7 (12 Nov 2018 09:48)  T(F): 97.7 (12 Nov 2018 13:34), Max: 98.1 (12 Nov 2018 09:48)  HR: 67 (12 Nov 2018 14:19) (62 - 82)  BP: 135/61 (12 Nov 2018 14:19) (104/42 - 162/90)  BP(mean): --  RR: 20 (12 Nov 2018 14:19) (14 - 20)  SpO2: 98% (12 Nov 2018 14:19) (98% - 100%)  I&O's Detail    I&O's Summary      LABS:                      EXAM:  General/Neuro: A&Ox3. No focal deficits. Follows commands. PERRL.   Respiratory  Exam: Lungs clear to auscultation, Normal expansion/effort.      Cardiovascular  Exam: S1, S2.  Regular rate and rhythm.   Cardiac Rhythm: Normal Sinus Rhythm    GI  Exam: Abdomen soft, Non-tender, Non-distended. Wound:   ***  Current Diet:  Slowly advance to clears.     Extremities  Exam: Extremities warm, pink, well-perfused.  Pulses palpable throughout.     Derm:  Exam: Good skin turgor, no skin breakdown.       :   Exam: No scott in place, awaiting trial of void.     Tubes/Lines/Drains  ***  [x] Peripheral IV  [] Arterial Line		[] R	[] L	[] Fem	[] Rad	[] Ax	Date Placed: SICU Consultation Note  =====================================================  HPI:   78y female with a PMHx of CAD, stent placement 2010, DEXTER, hypothyroidism, HTN, asthma, and glaucoma found to have bilateral high grade stenosis on cardiology screening. Patient was asymptomatic. Patient came for elective R CEA in August 2018, patient had a hematoma after this procedure and was taken back to the OR for evacuation. Today she is s/p L CEA. Case was uneventful. She remained stable throughout the case. Patient will be upgraded to SICU for overnight neurovascular monitoring.     Surgery Information  OR time:  2.5 hours    EBL: 30cc      IV Fluids:1500cc               PAST MEDICAL & SURGICAL HISTORY:  Asthma: cant remember last flare up  Obstructive sleep apnea on CPAP  Macular degeneration  CAD (coronary artery disease) of bypass graft: + AVR pig  Hypothyroid  HTN (hypertension)  Glaucoma  CAD (coronary artery disease)  History of surgery: Right CEA 9-18  per pt complication post op bleeding  History of hip replacement, total, right  S/P partial thyroidectomy  S/P cholecystectomy  CAD (coronary artery disease) of bypass graft: 3v avr      Allergies  erythromycin (Rash)  latex (Rash)  parsley (Headache)  penicillins (Rash)      SH:  Home Medications:  Alphagan 0.2% ophthalmic solution: 1 drop(s) to each affected eye 2 times a day (12 Nov 2018 09:46)  aspirin 81 mg oral tablet: 1 tab(s) orally once a day (12 Nov 2018 09:46)  Crestor 20 mg oral tablet: 1 tab(s) orally once a day (at bedtime) (12 Nov 2018 09:46)  levothyroxine 125 mcg (0.125 mg) oral tablet: 1 tab(s) orally once a day (12 Nov 2018 09:46)  metoprolol succinate 25 mg oral tablet, extended release: 1 tab(s) orally once a day (12 Nov 2018 09:46)  valsartan 160 mg oral tablet: 1 tab(s) orally once a day (12 Nov 2018 09:46)  Xalatan 0.005% ophthalmic solution: 1 drop(s) to each affected eye once a day (in the evening) (12 Nov 2018 09:46)    Advanced Directives: Full Code     ROS:  [x] A ten-point review of systems was otherwise negative except as noted.        CURRENT MEDICATIONS:   --------------------------------------------------------------------------------------  Neurologic Medications  acetaminophen   Tablet .. 650 milliGRAM(s) Oral every 4 hours PRN Mild Pain (1 - 3)  morphine  - Injectable 2 milliGRAM(s) IV Push every 10 minutes PRN Severe Pain (7 - 10)  ondansetron Injectable 4 milliGRAM(s) IV Push once PRN Nausea and/or Vomiting  oxyCODONE    IR 5 milliGRAM(s) Oral every 4 hours PRN mod-severe pain      Cardiovascular Medications  losartan 100 milliGRAM(s) Oral daily  metoprolol succinate ER 25 milliGRAM(s) Oral daily    Gastrointestinal Medications  docusate sodium 100 milliGRAM(s) Oral every 8 hours  lactated ringers. 1000 milliLiter(s) IV Continuous <Continuous>  sodium chloride 0.9%. 1000 milliLiter(s) IV Continuous <Continuous>      Hematologic/Oncologic Medications  aspirin enteric coated 81 milliGRAM(s) Oral daily      Endocrine/Metabolic Medications  atorvastatin 80 milliGRAM(s) Oral at bedtime  levothyroxine 125 MICROGram(s) Oral daily    Topical/Other Medications  brimonidine 0.2% Ophthalmic Solution 1 Drop(s) Both EYES two times a day  latanoprost 0.005% Ophthalmic Solution 1 Drop(s) Both EYES at bedtime    --------------------------------------------------------------------------------------    Vital Signs Last 24 Hrs  T(C): 36.5 (12 Nov 2018 13:34), Max: 36.7 (12 Nov 2018 09:48)  T(F): 97.7 (12 Nov 2018 13:34), Max: 98.1 (12 Nov 2018 09:48)  HR: 67 (12 Nov 2018 14:19) (62 - 82)  BP: 135/61 (12 Nov 2018 14:19) (104/42 - 162/90)  BP(mean): --  RR: 20 (12 Nov 2018 14:19) (14 - 20)  SpO2: 98% (12 Nov 2018 14:19) (98% - 100%)  I&O's Detail    I&O's Summary      LABS:                      EXAM:  General/Neuro: A&Ox3. No focal deficits. Follows commands. PERRL.   Respiratory  Exam: Lungs clear to auscultation, Normal expansion/effort.      Cardiovascular  Exam: S1, S2.  Regular rate and rhythm.   Cardiac Rhythm: Normal Sinus Rhythm  Wound: L CEA. Dressing is clean dry and intact. No evidence of hematoma.   GI  Exam: Abdomen soft, Non-tender, Non-distended. Wound:   ***  Current Diet:  Slowly advance to clears.     Extremities  Exam: Extremities warm, pink, well-perfused.  Pulses palpable throughout.     Derm:  Exam: Good skin turgor, no skin breakdown.       :   Exam: No scott in place, awaiting trial of void.     Tubes/Lines/Drains  ***  [x] Peripheral IV SICU Consultation Note  =====================================================  HPI:   78y female with a PMHx of CAD, stent placement 2010, DEXTER, hypothyroidism, HTN, asthma, and glaucoma found to have bilateral high grade stenosis on cardiology screening. Patient was asymptomatic. Patient came for elective R CEA in August 2018, patient had a hematoma after this procedure and was taken back to the OR for evacuation. Today she is s/p L CEA. Case was uneventful. She remained stable throughout the case. Patient will be upgraded to SICU for overnight neurovascular monitoring.     Surgery Information  OR time:  2.5 hours    EBL: 30cc      IV Fluids:1500cc               PAST MEDICAL & SURGICAL HISTORY:  Asthma: cant remember last flare up  Obstructive sleep apnea on CPAP  Macular degeneration  CAD (coronary artery disease) of bypass graft: + AVR pig  Hypothyroid  HTN (hypertension)  Glaucoma  CAD (coronary artery disease)  History of surgery: Right CEA 9-18  per pt complication post op bleeding  History of hip replacement, total, right  S/P partial thyroidectomy  S/P cholecystectomy  CAD (coronary artery disease) of bypass graft: 3v avr      Allergies  erythromycin (Rash)  latex (Rash)  parsley (Headache)  penicillins (Rash)      SH:  Home Medications:  Alphagan 0.2% ophthalmic solution: 1 drop(s) to each affected eye 2 times a day (12 Nov 2018 09:46)  aspirin 81 mg oral tablet: 1 tab(s) orally once a day (12 Nov 2018 09:46)  Crestor 20 mg oral tablet: 1 tab(s) orally once a day (at bedtime) (12 Nov 2018 09:46)  levothyroxine 125 mcg (0.125 mg) oral tablet: 1 tab(s) orally once a day (12 Nov 2018 09:46)  metoprolol succinate 25 mg oral tablet, extended release: 1 tab(s) orally once a day (12 Nov 2018 09:46)  valsartan 160 mg oral tablet: 1 tab(s) orally once a day (12 Nov 2018 09:46)  Xalatan 0.005% ophthalmic solution: 1 drop(s) to each affected eye once a day (in the evening) (12 Nov 2018 09:46)    Advanced Directives: Full Code     ROS:  [x] A ten-point review of systems was otherwise negative except as noted.        CURRENT MEDICATIONS:   --------------------------------------------------------------------------------------  Neurologic Medications  acetaminophen   Tablet .. 650 milliGRAM(s) Oral every 4 hours PRN Mild Pain (1 - 3)  morphine  - Injectable 2 milliGRAM(s) IV Push every 10 minutes PRN Severe Pain (7 - 10)  ondansetron Injectable 4 milliGRAM(s) IV Push once PRN Nausea and/or Vomiting  oxyCODONE    IR 5 milliGRAM(s) Oral every 4 hours PRN mod-severe pain      Cardiovascular Medications  losartan 100 milliGRAM(s) Oral daily  metoprolol succinate ER 25 milliGRAM(s) Oral daily    Gastrointestinal Medications  docusate sodium 100 milliGRAM(s) Oral every 8 hours  lactated ringers. 1000 milliLiter(s) IV Continuous <Continuous>  sodium chloride 0.9%. 1000 milliLiter(s) IV Continuous <Continuous>      Hematologic/Oncologic Medications  aspirin enteric coated 81 milliGRAM(s) Oral daily      Endocrine/Metabolic Medications  atorvastatin 80 milliGRAM(s) Oral at bedtime  levothyroxine 125 MICROGram(s) Oral daily    Topical/Other Medications  brimonidine 0.2% Ophthalmic Solution 1 Drop(s) Both EYES two times a day  latanoprost 0.005% Ophthalmic Solution 1 Drop(s) Both EYES at bedtime    --------------------------------------------------------------------------------------    Vital Signs Last 24 Hrs  T(C): 36.5 (12 Nov 2018 13:34), Max: 36.7 (12 Nov 2018 09:48)  T(F): 97.7 (12 Nov 2018 13:34), Max: 98.1 (12 Nov 2018 09:48)  HR: 67 (12 Nov 2018 14:19) (62 - 82)  BP: 135/61 (12 Nov 2018 14:19) (104/42 - 162/90)  BP(mean): --  RR: 20 (12 Nov 2018 14:19) (14 - 20)  SpO2: 98% (12 Nov 2018 14:19) (98% - 100%)  I&O's Detail    I&O's Summary      LABS                        9.8    7.10  )-----------( 209      ( 12 Nov 2018 13:39 )             29.7       Auto Neutrophil %: 54.8 % (11-12-18 @ 13:39)  Auto Immature Granulocyte %: 0.3 % (11-12-18 @ 13:39)    11-12    136  |  96<L>  |  25<H>  ----------------------------<  93  3.5   |  27  |  0.9      Calcium, Total Serum: 8.6 mg/dL (11-12-18 @ 13:39)      LFTs:     Blood Gas Arterial, Lactate: 1.1 mmoL/L (11-12-18 @ 14:30)    ABG - ( 12 Nov 2018 14:30 )  pH: 7.42  /  pCO2: 46    /  pO2: 135   / HCO3: 29    / Base Excess: 4.3   /  SaO2: 99                Coags:     12.20  ----< 1.06    ( 12 Nov 2018 13:39 )     165.9       CARDIAC MARKERS ( 12 Nov 2018 13:39 )  x     / <0.01 ng/mL / 68 U/L / x     / 2.2 ng/mL        EXAM:  General/Neuro: A&Ox3. No focal deficits. Follows commands. PERRL.   Respiratory  Exam: Lungs clear to auscultation, Normal expansion/effort.      Cardiovascular  Exam: S1, S2.  Regular rate and rhythm.   Cardiac Rhythm: Normal Sinus Rhythm  Wound: L CEA. Dressing is clean dry and intact. No evidence of hematoma.   GI  Exam: Abdomen soft, Non-tender, Non-distended. Wound:   ***  Current Diet:  Slowly advance to clears.     Extremities  Exam: Extremities warm, pink, well-perfused.  Pulses palpable throughout.     Derm:  Exam: Good skin turgor, no skin breakdown.       :   Exam: No scott in place, awaiting trial of void.     Tubes/Lines/Drains  ***  [x] Peripheral IV

## 2018-11-12 NOTE — BRIEF OPERATIVE NOTE - PROCEDURE
<<-----Click on this checkbox to enter Procedure Carotid endarterectomy  11/12/2018    Active  DAYDAY

## 2018-11-12 NOTE — CONSULT NOTE ADULT - ASSESSMENT
ASSESSMENT/PLAN: 78yFemale      Neurologic:    Respiratory:    Cardiovascular:    Gastrointestinal/Nutrition:    Genitourinary/Renal:    Hematologic:    Infectious Disease:    Endocrine:    Disposition: ASSESSMENT/PLAN: 78yFemale with PMHx HTN, CAD, stent placement 2010, hypothyroid, asthma, DEXTER found to have BL carotid stenosis on cardiology workup, patient was asymptomatic. R CEA completed in August 2018. S/p L CEA      Neurologic: Monitor for changes in mental status. Pain control with Tylenol and Oxycodone PRN for breakthrough pain.     Respiratory: RA satting well.     Cardiovascular: Ddx HTN, CAD, HLD. Continue home ASA, losartan, metoprolol succinate and simvastatin.  3 sets of cardiac enzyme ordered, pending all three. EKG post op unchanged from pre-up.     Gastrointestinal/Nutrition: Advance to clears. PPI ppx. Bowel regimen. Zofran PRN for nausea. NS @ 100cc/hr.     Genitourinary/Renal: No scott, awaiting trial of void. Monitor lytes and replete as needed.     Hematologic: DVT ppx: SCD. No Hep Sub Q as per primary team.     Infectious Disease: Milagros-op Ancef given. Monitor for fever/chills.     Endocrine: Ddx: hypothyroid. Continue home levothyroxine.     Disposition: SICU     Discussed with attending ASSESSMENT/PLAN: 78yFemale with PMHx HTN, CAD, stent placement 2010, hypothyroid, asthma, DEXTER found to have BL carotid stenosis on cardiology workup, patient was asymptomatic. R CEA completed in August 2018. S/p L CEA      Neurologic: Monitor for changes in mental status. Pain control with Tylenol and Oxycodone PRN for breakthrough pain.     Respiratory: RA satting well.     Cardiovascular: Ddx HTN, CAD, HLD. Continue home ASA, losartan, metoprolol succinate and simvastatin.  First set of cardiac enzymes negative, pending the second sets. EKG post op unchanged from pre-up.     Gastrointestinal/Nutrition: Advance to clears. PPI ppx. Bowel regimen. Zofran PRN for nausea. NS @ 100cc/hr.     Genitourinary/Renal: No scott, awaiting trial of void. Monitor lytes and replete as needed.     Hematologic: DVT ppx: SCD. No Hep Sub Q as per primary team.     Infectious Disease: Milagros-op Ancef given. Monitor for fever/chills.     Endocrine: Ddx: hypothyroid. Continue home levothyroxine.     Disposition: SICU     Discussed with attending ASSESSMENT/PLAN: 78yFemale with PMHx HTN, CAD, stent placement 2010, hypothyroid, asthma, DEXTER found to have BL carotid stenosis on cardiology workup, patient was asymptomatic. R CEA completed in August 2018. S/p L CEA      Neurologic: Monitor for changes in mental status. Pain control with Tylenol and Oxycodone PRN for breakthrough pain.     Respiratory: RA satting well.     Cardiovascular: Ddx HTN, CAD, HLD. Continue home ASA, losartan, metoprolol succinate and simvastatin.  First set of cardiac enzymes negative, pending the second sets. EKG post op unchanged from pre-up.     Gastrointestinal/Nutrition: Advance to clears. PPI ppx. Bowel regimen. Zofran PRN for nausea. NS @ 100cc/hr.     Genitourinary/Renal: No scott, awaiting trial of void. Monitor lytes and replete as needed. K 3.5 repleted with 3 Kriders.     Hematologic: DVT ppx: SCD. No Hep Sub Q as per primary team.     Infectious Disease: Milagros-op Ancef given. Monitor for fever/chills.     Endocrine: Ddx: hypothyroid. Continue home levothyroxine.     Disposition: SICU     Discussed with attending ASSESSMENT/PLAN: 78yFemale with PMHx HTN, CAD, stent placement 2010, hypothyroid, asthma, DEXTER found to have BL carotid stenosis on cardiology workup, patient was asymptomatic. R CEA completed in August 2018. S/p L CEA      Neurologic: Monitor for changes in mental status. Pain control with Tylenol and Oxycodone PRN for breakthrough pain.     Respiratory: RA satting well.     Cardiovascular: Ddx HTN, CAD, HLD. Continue home ASA, losartan, metoprolol succinate and simvastatin.  First set of cardiac enzymes negative, pending the second sets. EKG post op unchanged from pre-up. Neurovascular checks Q 1 hour.     Gastrointestinal/Nutrition: Advance to clears. PPI ppx. Bowel regimen. Zofran PRN for nausea. NS @ 100cc/hr.     Genitourinary/Renal: No scott, awaiting trial of void. Monitor lytes and replete as needed. K 3.5 repleted with 3 Kriders.     Hematologic: DVT ppx: SCD. No Hep Sub Q as per primary team.     Infectious Disease: Milagros-op Ancef given. Monitor for fever/chills.     Endocrine: Ddx: hypothyroid. Continue home levothyroxine.     Disposition: SICU     Discussed with attending

## 2018-11-13 ENCOUNTER — TRANSCRIPTION ENCOUNTER (OUTPATIENT)
Age: 78
End: 2018-11-13

## 2018-11-13 VITALS
HEART RATE: 60 BPM | SYSTOLIC BLOOD PRESSURE: 97 MMHG | RESPIRATION RATE: 16 BRPM | DIASTOLIC BLOOD PRESSURE: 47 MMHG | OXYGEN SATURATION: 85 %

## 2018-11-13 LAB
ANION GAP SERPL CALC-SCNC: 14 MMOL/L — SIGNIFICANT CHANGE UP (ref 7–14)
APTT BLD: 23.8 SEC — CRITICAL LOW (ref 27–39.2)
BASOPHILS # BLD AUTO: 0.02 K/UL — SIGNIFICANT CHANGE UP (ref 0–0.2)
BASOPHILS NFR BLD AUTO: 0.3 % — SIGNIFICANT CHANGE UP (ref 0–1)
BUN SERPL-MCNC: 22 MG/DL — HIGH (ref 10–20)
CALCIUM SERPL-MCNC: 8.5 MG/DL — SIGNIFICANT CHANGE UP (ref 8.5–10.1)
CHLORIDE SERPL-SCNC: 100 MMOL/L — SIGNIFICANT CHANGE UP (ref 98–110)
CK MB CFR SERPL CALC: 3.4 NG/ML — SIGNIFICANT CHANGE UP (ref 0.6–6.3)
CK SERPL-CCNC: 130 U/L — SIGNIFICANT CHANGE UP (ref 0–225)
CO2 SERPL-SCNC: 26 MMOL/L — SIGNIFICANT CHANGE UP (ref 17–32)
CREAT SERPL-MCNC: 1 MG/DL — SIGNIFICANT CHANGE UP (ref 0.7–1.5)
EOSINOPHIL # BLD AUTO: 0.17 K/UL — SIGNIFICANT CHANGE UP (ref 0–0.7)
EOSINOPHIL NFR BLD AUTO: 2.1 % — SIGNIFICANT CHANGE UP (ref 0–8)
GLUCOSE SERPL-MCNC: 105 MG/DL — HIGH (ref 70–99)
HCT VFR BLD CALC: 31.1 % — LOW (ref 37–47)
HGB BLD-MCNC: 10.1 G/DL — LOW (ref 12–16)
IMM GRANULOCYTES NFR BLD AUTO: 0.4 % — HIGH (ref 0.1–0.3)
INR BLD: 0.96 RATIO — SIGNIFICANT CHANGE UP (ref 0.65–1.3)
LYMPHOCYTES # BLD AUTO: 1.39 K/UL — SIGNIFICANT CHANGE UP (ref 1.2–3.4)
LYMPHOCYTES # BLD AUTO: 17.6 % — LOW (ref 20.5–51.1)
MAGNESIUM SERPL-MCNC: 1.9 MG/DL — SIGNIFICANT CHANGE UP (ref 1.8–2.4)
MCHC RBC-ENTMCNC: 29.3 PG — SIGNIFICANT CHANGE UP (ref 27–31)
MCHC RBC-ENTMCNC: 32.5 G/DL — SIGNIFICANT CHANGE UP (ref 32–37)
MCV RBC AUTO: 90.1 FL — SIGNIFICANT CHANGE UP (ref 81–99)
MONOCYTES # BLD AUTO: 0.54 K/UL — SIGNIFICANT CHANGE UP (ref 0.1–0.6)
MONOCYTES NFR BLD AUTO: 6.8 % — SIGNIFICANT CHANGE UP (ref 1.7–9.3)
NEUTROPHILS # BLD AUTO: 5.76 K/UL — SIGNIFICANT CHANGE UP (ref 1.4–6.5)
NEUTROPHILS NFR BLD AUTO: 72.8 % — SIGNIFICANT CHANGE UP (ref 42.2–75.2)
PHOSPHATE SERPL-MCNC: 4.8 MG/DL — SIGNIFICANT CHANGE UP (ref 2.1–4.9)
PLATELET # BLD AUTO: 180 K/UL — SIGNIFICANT CHANGE UP (ref 130–400)
POTASSIUM SERPL-MCNC: 4.9 MMOL/L — SIGNIFICANT CHANGE UP (ref 3.5–5)
POTASSIUM SERPL-SCNC: 4.9 MMOL/L — SIGNIFICANT CHANGE UP (ref 3.5–5)
PROTHROM AB SERPL-ACNC: 11.1 SEC — SIGNIFICANT CHANGE UP (ref 9.95–12.87)
RBC # BLD: 3.45 M/UL — LOW (ref 4.2–5.4)
RBC # FLD: 12.6 % — SIGNIFICANT CHANGE UP (ref 11.5–14.5)
SODIUM SERPL-SCNC: 140 MMOL/L — SIGNIFICANT CHANGE UP (ref 135–146)
TROPONIN T SERPL-MCNC: <0.01 NG/ML — SIGNIFICANT CHANGE UP
WBC # BLD: 7.91 K/UL — SIGNIFICANT CHANGE UP (ref 4.8–10.8)
WBC # FLD AUTO: 7.91 K/UL — SIGNIFICANT CHANGE UP (ref 4.8–10.8)

## 2018-11-13 PROCEDURE — 99233 SBSQ HOSP IP/OBS HIGH 50: CPT

## 2018-11-13 RX ORDER — BENZOCAINE AND MENTHOL 5; 1 G/100ML; G/100ML
1 LIQUID ORAL ONCE
Qty: 0 | Refills: 0 | Status: COMPLETED | OUTPATIENT
Start: 2018-11-13 | End: 2018-11-13

## 2018-11-13 RX ORDER — PANTOPRAZOLE SODIUM 20 MG/1
40 TABLET, DELAYED RELEASE ORAL
Qty: 0 | Refills: 0 | Status: DISCONTINUED | OUTPATIENT
Start: 2018-11-13 | End: 2018-11-13

## 2018-11-13 RX ORDER — ACETAMINOPHEN 500 MG
2 TABLET ORAL
Qty: 0 | Refills: 0 | DISCHARGE
Start: 2018-11-13

## 2018-11-13 RX ORDER — CHLORHEXIDINE GLUCONATE 213 G/1000ML
1 SOLUTION TOPICAL
Qty: 0 | Refills: 0 | Status: DISCONTINUED | OUTPATIENT
Start: 2018-11-13 | End: 2018-11-13

## 2018-11-13 RX ORDER — SENNA PLUS 8.6 MG/1
1 TABLET ORAL DAILY
Qty: 0 | Refills: 0 | Status: DISCONTINUED | OUTPATIENT
Start: 2018-11-13 | End: 2018-11-13

## 2018-11-13 RX ADMIN — Medication 100 MILLIGRAM(S): at 05:05

## 2018-11-13 RX ADMIN — Medication 81 MILLIGRAM(S): at 11:58

## 2018-11-13 RX ADMIN — OXYCODONE HYDROCHLORIDE 5 MILLIGRAM(S): 5 TABLET ORAL at 01:10

## 2018-11-13 RX ADMIN — Medication 25 MILLIGRAM(S): at 06:08

## 2018-11-13 RX ADMIN — LOSARTAN POTASSIUM 100 MILLIGRAM(S): 100 TABLET, FILM COATED ORAL at 05:05

## 2018-11-13 RX ADMIN — Medication 125 MICROGRAM(S): at 05:05

## 2018-11-13 RX ADMIN — OXYCODONE HYDROCHLORIDE 5 MILLIGRAM(S): 5 TABLET ORAL at 08:12

## 2018-11-13 RX ADMIN — PANTOPRAZOLE SODIUM 40 MILLIGRAM(S): 20 TABLET, DELAYED RELEASE ORAL at 06:08

## 2018-11-13 RX ADMIN — BRIMONIDINE TARTRATE 1 DROP(S): 2 SOLUTION/ DROPS OPHTHALMIC at 05:06

## 2018-11-13 RX ADMIN — SENNA PLUS 1 TABLET(S): 8.6 TABLET ORAL at 11:58

## 2018-11-13 RX ADMIN — BENZOCAINE AND MENTHOL 1 LOZENGE: 5; 1 LIQUID ORAL at 03:01

## 2018-11-13 NOTE — DISCHARGE NOTE ADULT - CARE PLAN
Principal Discharge DX:	S/P carotid endarterectomy  Goal:	Complete Recovery  Assessment and plan of treatment:	1. Continue using all home medications. Use Tylenols for mild pain. Do not drive for 4 weeks.  2. Continue regular diet and fluid intake as tolerated.  3. Ambulate as tolerated and use incentive spirometer (10x/hour) when awake.  4. Dressing can be off on Saturday. Leave Steri-strips and let them fall off.  5. Shower is okay to take.  6. Do not lift heavy weight (10 lbs or more) for 4-6 weeks.  7. Follow-up with Dr. Bonilla in his Clinic in 1-2 weeks and call [phone] to schedule your appointment.  8. In case of any worsening of symptoms/signs, please go to nearby Emergency Department or call [phone].

## 2018-11-13 NOTE — DISCHARGE NOTE ADULT - PATIENT PORTAL LINK FT
You can access the [facility] Patient Portal, offered by [facility], by registering with the following website: [email or web address]

## 2018-11-13 NOTE — PROGRESS NOTE ADULT - ASSESSMENT
ASSESSMENT/PLAN:   78yFemale with PMHx HTN, CAD, stent placement 2010, hypothyroid, asthma, DEXTER found to have BL carotid stenosis on cardiology workup, patient was asymptomatic. R CEA completed in August 2018. S/p L CEA      Neurologic: Monitor for changes in mental status. Pain control with Tylenol and Oxycodone PRN for breakthrough pain.     Respiratory: RA satting well.     Cardiovascular: Ddx HTN, CAD, HLD. Continue home ASA, losartan, metoprolol succinate and simvastatin.  First set of cardiac enzymes negative, pending the second sets. EKG post op unchanged from pre-up. Neurovascular checks Q 1 hour.     Gastrointestinal/Nutrition: Advance to clears. PPI ppx. Bowel regimen. Zofran PRN for nausea. NS @ 100cc/hr.     Genitourinary/Renal: No scott, awaiting trial of void. Monitor lytes and replete as needed. K 3.5 repleted with 3 Kriders.     Hematologic: DVT ppx: SCD. No Hep Sub Q as per primary team.     Infectious Disease: Milagros-op Ancef given. Monitor for fever/chills.     Endocrine: Ddx: hypothyroid. Continue home levothyroxine.     Disposition: SICU     Discussed with attending ASSESSMENT:  78yFemale with PMHx HTN, CAD, stent placement 2010, hypothyroid, asthma, DEXTER found to have BL carotid stenosis on cardiology workup, patient was asymptomatic. R CEA completed in August 2018. S/p L CEA        PLAN:    Neurologic: Monitor for changes in mental status. Pain control with Tylenol and Oxycodone PRN for breakthrough pain.     Respiratory: RA satting well.     Cardiovascular: Ddx HTN, CAD, HLD. Continue home ASA, losartan, metoprolol succinate and simvastatin.  First set of cardiac enzymes negative, pending the second sets. EKG post op unchanged from pre-up. Neurovascular checks Q 1 hour.     Gastrointestinal/Nutrition: Advance to clears. PPI ppx. Bowel regimen. Zofran PRN for nausea. NS @ 100cc/hr.     Genitourinary/Renal: Postop urinary retention: No scott intraop, failed TOV w/ bladder scan >500cc, Scott placed. started flomax    Hematologic: DVT ppx: SCD. No Hep Sub Q as per primary team.     Infectious Disease: Milagros-op Ancef given. Monitor for fever/chills.     Endocrine: Ddx: hypothyroid. Continue home levothyroxine.     Disposition: Downgrade from SICU ASSESSMENT:  78yFemale with PMHx HTN, CAD, stent placement 2010, hypothyroid, asthma, DEXTER found to have BL carotid stenosis on cardiology workup, patient was asymptomatic. R CEA completed in August 2018. S/p L CEA        PLAN:    Neurologic: s/p Left CEA for High grade stenosis asymptomatic. AAO x 3, no neuro deficits. Monitor for changes in mental status. Pain control with Tylenol and Oxycodone PRN for breakthrough pain.   Hx of Rt CEA in 08/2018    Neurologic Medications  acetaminophen   Tablet .. 650 milliGRAM(s) Oral every 4 hours PRN Mild Pain (1 - 3)  oxyCODONE    IR 5 milliGRAM(s) Oral every 4 hours PRN mod-severe pain    Respiratory: Saturating well on RA. (98% - 100%)  Hx of Obstructive sleep apnea on CPAP.  Hx of Asthma  RR: 20 (11-13-18 @ 03:00) (13 - 21)  SpO2: 99% (11-13-18 @ 03:00) (98% - 100%)    Cardiovascular: Ddx HTN, CAD, HLD. Continue home ASA, losartan, metoprolol succinate and simvastatin.  CE x 2 neg,  EKG post op unchanged from pre-up. Neurovascular checks Q 1 hour.     HR: 66 (11-13-18 @ 03:00) (58 - 82)  BP: 123/66 (11-13-18 @ 03:00) (96/74 - 162/90)  BP(mean): 99 (11-13-18 @ 03:00) (63 - 99)    Cardiovascular Medications  losartan 100 milliGRAM(s) Oral daily  metoprolol succinate ER 25 milliGRAM(s) Oral daily    EKG:  (11-12-18 @ 14:06): NSR    Troponin:   <0.01 (11-12-18 @ 20:25)  <0.01 (11-12-18 @ 13:39)    Gastrointestinal/Nutrition: Advance to clears. PPI ppx. Bowel regimen. Zofran PRN for nausea. NS @ 100cc/hr.     Gastrointestinal Medications  docusate sodium 100 milliGRAM(s) Oral every 8 hours    Genitourinary/Renal: Postop urinary retention: No scott intraop, failed TOV w/ bladder scan >500cc, Scott placed. started flomax    tamsulosin 0.4 milliGRAM(s) Oral at bedtime  Hematologic: DVT ppx: SCD. No Hep Sub Q as per primary team.     Infectious Disease: Milagros-op Ancef given. Monitor for fever/chills.     Endocrine: Ddx: hypothyroid. Continue home levothyroxine.     Disposition: Downgrade from SICU ASSESSMENT:  78F with PMHx HTN, CAD, stent placement , hypothyroid, asthma, DEXTER found to have BL carotid stenosis on cardiology workup, patient was asymptomatic. R CEA completed in 2018. S/p L CEA     PLAN:    Neurologic: s/p Left CEA for High grade stenosis asymptomatic. AAO x 3, no neuro deficits. Monitor for changes in mental status. Pain control with Tylenol and Oxycodone PRN for breakthrough pain.   Hx of Rt CEA in 2018    Neurologic Medications  acetaminophen   Tablet .. 650 milliGRAM(s) Oral every 4 hours PRN Mild Pain (1 - 3)  oxyCODONE    IR 5 milliGRAM(s) Oral every 4 hours PRN mod-severe pain    Respiratory: Saturating well on RA. (98% - 100%)  Hx of Obstructive sleep apnea on CPAP.  Hx of Asthma  RR: 20 (18 @ 03:00) (13 - 21)  SpO2: 99% (18 @ 03:00) (98% - 100%)    Cardiovascular: Ddx HTN, CAD, HLD. Continue home ASA, losartan, metoprolol succinate and simvastatin.  CE x 2 neg,  EKG post op unchanged from pre-up. Neurovascular checks Q 1 hour.     HR: 66 (18 @ 03:00) (58 - 82)  BP: 123/66 (18 @ 03:00) (96/74 - 162/90)  BP(mean): 99 (18 @ 03:00) (63 - 99)    Cardiovascular Medications  losartan 100 milliGRAM(s) Oral daily  metoprolol succinate ER 25 milliGRAM(s) Oral daily    EKG:  (18 @ 14:06): NSR    Troponin:   <0.01 (18 @ 20:25)  <0.01 (18 @ 13:39)    Gastrointestinal/Nutrition: Advance to clears. PPI ppx. Bowel regimen. Zofran PRN for nausea. NS @ 100cc/hr.     Gastrointestinal Medications  docusate sodium 100 milliGRAM(s) Oral every 8 hours  pantoprazole    Tablet 40 milliGRAM(s) Oral before breakfast  senna 1 Tablet(s) Oral daily    Genitourinary/Renal: Postop urinary retention: No scott intraop, failed TOV w/ bladder scan >500cc, Scott placed. UOP:  Indwelling Catheter - Urethral: 970 mL, (70-120cc/hr), Started Flomax  -Strict I&O  -Replete electrolytes PRN    Genitourinary Medications  tamsulosin 0.4 milliGRAM(s) Oral at bedtime    BUN/Creat: 22/1.0 (18 @ 00:32)  Na:140 (18 @ 00:32)  K:4.9 (18 @ 00:32)  M.9 (18 @ 00:32)  Phos: 4.8 (18 @ 00:32)    Hematologic: DVT ppx: SCD. No Hep Sub Q as per primary team.     Hematologic/Oncologic Medications  aspirin enteric coated 81 milliGRAM(s) Oral daily    Hb:10.1 (18 @ 00:32)  Plt:180 (18 @ 00:32)  INR:0.96 (18 @ 00:32)  PTT:23.8 (18 @ 00:32)  Lactate (ABG):1.1 (18 @ 14:30)    Infectious Disease: Milagros-op Ancef given. Afebrile Max: 98.6, WBC 7.9<--7.1. Monitor for fever/chills.     Endocrine: Ddx: Hx of hypothyroidism . Continue home levothyroxine. Hx of HLD on Atorvastatin.     Endocrine/Metabolic Medications  atorvastatin 80 milliGRAM(s) Oral at bedtime  levothyroxine 125 MICROGram(s) Oral daily    Disposition: Downgrade from SICU ASSESSMENT:  78F with PMHx HTN, CAD, stent placement , hypothyroid, asthma, DEXTER found to have BL carotid stenosis on cardiology workup, patient was asymptomatic. R CEA completed in 2018. S/p L CEA     PLAN:    Neurologic: s/p Left CEA for High grade stenosis asymptomatic. AAO x 3, no neuro deficits. Monitor for changes in mental status. Pain control with Tylenol and Oxycodone PRN for breakthrough pain.   Hx of Rt CEA in 2018    Neurologic Medications  acetaminophen   Tablet .. 650 milliGRAM(s) Oral every 4 hours PRN Mild Pain (1 - 3)  oxyCODONE    IR 5 milliGRAM(s) Oral every 4 hours PRN mod-severe pain    Respiratory: Saturating well on RA. (98% - 100%)  Hx of Obstructive sleep apnea on CPAP at night.  Hx of Asthma  RR: 20 (18 @ 03:00) (13 - 21)  SpO2: 99% (18 @ 03:00) (98% - 100%)    Cardiovascular: Ddx HTN, CAD, HLD. Continue home ASA, losartan, metoprolol succinate and simvastatin.  CE x 2 neg,  EKG post op unchanged from pre-up. Neurovascular checks Q 1 hour.     HR: 66 (18 @ 03:00) (58 - 82)  BP: 123/66 (18 @ 03:00) (96/74 - 162/90)  BP(mean): 99 (18 @ 03:00) (63 - 99)    Cardiovascular Medications  losartan 100 milliGRAM(s) Oral daily  metoprolol succinate ER 25 milliGRAM(s) Oral daily    EKG:  (18 @ 14:06): NSR    Troponin:   <0.01 (18 @ 20:25)  <0.01 (18 @ 13:39)    Gastrointestinal/Nutrition: Advance to clears. PPI ppx. Bowel regimen. Zofran PRN for nausea. NS @ 100cc/hr.     Gastrointestinal Medications  docusate sodium 100 milliGRAM(s) Oral every 8 hours  pantoprazole    Tablet 40 milliGRAM(s) Oral before breakfast  senna 1 Tablet(s) Oral daily    Genitourinary/Renal: Postop urinary retention: No sctot intraop, failed TOV w/ bladder scan >500cc, Scott placed. UOP:  Indwelling Catheter - Urethral: 970 mL, (70-120cc/hr), Started Flomax  -Strict I&O  -Replete electrolytes PRN    Genitourinary Medications  tamsulosin 0.4 milliGRAM(s) Oral at bedtime    BUN/Creat: 22/1.0 (18 @ 00:32)  Na:140 (18 @ 00:32)  K:4.9 (18 @ 00:32)  M.9 (18 @ 00:32)  Phos: 4.8 (18 @ 00:32)    Hematologic: DVT ppx: SCD. No Hep Sub Q as per primary team.     Hematologic/Oncologic Medications  aspirin enteric coated 81 milliGRAM(s) Oral daily    Hb:10.1 (18 @ 00:32)  Plt:180 (18 @ 00:32)  INR:0.96 (18 @ 00:32)  PTT:23.8 (18 @ 00:32)  Lactate (ABG):1.1 (18 @ 14:30)    Infectious Disease: Milagros-op Ancef given. Afebrile Max: 98.6, WBC 7.9<--7.1. Monitor for fever/chills.     Endocrine: Ddx: Hx of hypothyroidism . Continue home levothyroxine. Hx of HLD on Atorvastatin.     Endocrine/Metabolic Medications  atorvastatin 80 milliGRAM(s) Oral at bedtime  levothyroxine 125 MICROGram(s) Oral daily    Disposition: Downgrade from SICU

## 2018-11-13 NOTE — PROGRESS NOTE ADULT - SUBJECTIVE AND OBJECTIVE BOX
GENERAL SURGERY PROGRESS NOTE     SHAE HENRIQUEZ  78y  Female  Hospital day :1d  POD:  Procedure: Carotid endarterectomy    OVERNIGHT EVENTS:  Patient is doing well post operatively. Sterile dressing is in place and intact with mild sanguinous discharge. CN II-XII grossly intact. No swelling or erythema noted. Patient denies difficulty swallowing or breathing. Tolerating the liquids she has had thus far. Denies HA.  T(F): 97.4 (11-13-18 @ 04:00), Max: 98.6 (11-12-18 @ 14:34)  HR: 60 (11-13-18 @ 04:00) (58 - 82)  BP: 127/58 (11-13-18 @ 04:00) (96/74 - 162/90)  ABP: 149/59 (11-12-18 @ 14:34) (130/54 - 192/82)  ABP(mean): 91 (11-12-18 @ 14:34) (80 - 121)  RR: 18 (11-13-18 @ 04:00) (13 - 21)  SpO2: 99% (11-13-18 @ 04:00) (98% - 100%)    DIET/FLUIDS: potassium chloride  20 mEq/100 mL IVPB 20 milliEquivalent(s) IV Intermittent every 1 hour  sodium chloride 0.9%. 1000 milliLiter(s) IV Continuous <Continuous>    NG:                                                                                DRAINS:     BM:     EMESIS:     URINE:    Indwelling Urethral Catheter:     Connect To:  Straight Drainage/[place]    Indication:  Urinary Retention / Obstruction    Additional Instructions:  bladder scan w/ >500cc @ 21:00 (11-12-18 @ 21:14)    GI proph:  pantoprazole    Tablet 40 milliGRAM(s) Oral before breakfast    AC/ proph: aspirin enteric coated 81 milliGRAM(s) Oral daily    ABx:     PHYSICAL EXAM:  GENERAL: NAD, well-appearing  CHEST/LUNG:  no obvious increased wob   NECK: sterile dressing in place with mild serosanguinous fluid on it, intact. No swelling or erythema noted  NEURO: CN II-XII grossly intact       LABS  Labs:  CAPILLARY BLOOD GLUCOSE                              10.1   7.91  )-----------( 180      ( 13 Nov 2018 00:32 )             31.1       Auto Immature Granulocyte %: 0.4 % (11-13-18 @ 00:32)  Auto Neutrophil %: 72.8 % (11-13-18 @ 00:32)  Auto Neutrophil %: 54.8 % (11-12-18 @ 13:39)  Auto Immature Granulocyte %: 0.3 % (11-12-18 @ 13:39)    11-13    140  |  100  |  22<H>  ----------------------------<  105<H>  4.9   |  26  |  1.0      Calcium, Total Serum: 8.5 mg/dL (11-13-18 @ 00:32)      LFTs:     Blood Gas Arterial, Lactate: 1.1 mmoL/L (11-12-18 @ 14:30)    ABG - ( 12 Nov 2018 14:30 )  pH: 7.42  /  pCO2: 46    /  pO2: 135   / HCO3: 29    / Base Excess: 4.3   /  SaO2: 99                Coags:     11.10  ----< 0.96    ( 13 Nov 2018 00:32 )     23.8        CARDIAC MARKERS ( 12 Nov 2018 20:25 )  x     / <0.01 ng/mL / 70 U/L / x     / 2.1 ng/mL  CARDIAC MARKERS ( 12 Nov 2018 13:39 )  x     / <0.01 ng/mL / 68 U/L / x     / 2.2 ng/mL

## 2018-11-13 NOTE — DISCHARGE NOTE ADULT - ADDITIONAL INSTRUCTIONS
1. Continue using all home medications. Use Tylenols for mild pain. Do not drive for 4 weeks.  2. Continue regular diet and fluid intake as tolerated.  3. Ambulate as tolerated and use incentive spirometer (10x/hour) when awake.  4. Dressing can be off on Saturday. Leave Steri-strips and let them fall off.  5. Shower is okay to take.  6. Do not lift heavy weight (10 lbs or more) for 4-6 weeks.  7. Follow-up with Dr. Bonilla in his Clinic in 1-2 weeks and call [phone] to schedule your appointment.  8. In case of any worsening of symptoms/signs, please go to nearby Emergency Department or call [phone].

## 2018-11-13 NOTE — DISCHARGE NOTE ADULT - HOSPITAL COURSE
78y female with a PMHx of CAD, stent placement 2010, DEXTER, hypothyroidism, HTN, asthma, and glaucoma found to have bilateral high grade stenosis on cardiology screening. Patient was asymptomatic. Patient came for elective R CEA in August 2018, patient had a hematoma after this procedure and was taken back to the OR for evacuation. On 11/12, she is s/p L CEA. Case was uneventful. She remained stable throughout the case. Patient was upgraded to SICU for overnight neurovascular monitoring. Pt did well, was out of bed to chair and tolerated the diet. Vascular surgery and Dr. Bonilla saw the pt this morning and was seemed ready to be discharged home and clinic follow-up with Dr. Bonilal in 2 weeks. All instructions were given both verbally and in written.

## 2018-11-13 NOTE — PROGRESS NOTE ADULT - ATTENDING COMMENTS
Assessment and plan above were modified and discussed with residents, physician assistants, and nurses.  I have provided  25 min of Critical Care to this patient.

## 2018-11-13 NOTE — DISCHARGE NOTE ADULT - MEDICATION SUMMARY - MEDICATIONS TO TAKE
I will START or STAY ON the medications listed below when I get home from the hospital:    aspirin 81 mg oral tablet  -- 1 tab(s) by mouth once a day  -- Indication: For CAD    acetaminophen 325 mg oral tablet  -- 2 tab(s) by mouth every 4 hours, As needed, Mild Pain (1 - 3)  -- Indication: For Pain    valsartan 160 mg oral tablet  -- 1 tab(s) by mouth once a day  -- Indication: For Blood Pressure    Crestor 20 mg oral tablet  -- 1 tab(s) by mouth once a day (at bedtime)  -- Indication: For Dyslipidemia    metoprolol succinate 25 mg oral tablet, extended release  -- 1 tab(s) by mouth once a day  -- Indication: For CAD    Xalatan 0.005% ophthalmic solution  -- 1 drop(s) to each affected eye once a day (in the evening)  -- Indication: For Glaucoma    Alphagan 0.2% ophthalmic solution  -- 1 drop(s) to each affected eye 2 times a day  -- Indication: For Glaucoma    levothyroxine 125 mcg (0.125 mg) oral tablet  -- 1 tab(s) by mouth once a day  -- Indication: For Hypothyroidism

## 2018-11-13 NOTE — PROGRESS NOTE ADULT - SUBJECTIVE AND OBJECTIVE BOX
SHAE HENRIQUEZ  [id number]  78y Female    Indication for ICU admission: s/p L CEA     78y female with a PMHx of CAD, stent placement 2010, DEXTER, hypothyroidism, HTN, asthma, and glaucoma found to have bilateral high grade stenosis on cardiology screening. Patient was asymptomatic. Patient came for elective R CEA in August 2018, patient had a hematoma after this procedure and was taken back to the OR for evacuation. Today she is s/p L CEA. Case was uneventful. She remained stable throughout the case. Patient will be upgraded to SICU for overnight neurovascular monitoring.     Admit Date:11-12-18  ICU Date: 11-  OR Date: 11-    erythromycin (Rash)  latex (Rash)  parsley (Headache)  penicillins (Rash)    PAST MEDICAL & SURGICAL HISTORY:  Asthma: cant remember last flare up  Obstructive sleep apnea on CPAP  Macular degeneration  CAD (coronary artery disease) of bypass graft: + AVR pig  Hypothyroid  HTN (hypertension)  Glaucoma  CAD (coronary artery disease)  History of surgery: Right CEA 9-18  per pt complication post op bleeding  History of hip replacement, total, right  S/P partial thyroidectomy  S/P cholecystectomy  CAD (coronary artery disease) of bypass graft: 3v avr    Home Medications:  Alphagan 0.2% ophthalmic solution: 1 drop(s) to each affected eye 2 times a day (12 Nov 2018 09:46)  aspirin 81 mg oral tablet: 1 tab(s) orally once a day (12 Nov 2018 09:46)  Crestor 20 mg oral tablet: 1 tab(s) orally once a day (at bedtime) (12 Nov 2018 09:46)  levothyroxine 125 mcg (0.125 mg) oral tablet: 1 tab(s) orally once a day (12 Nov 2018 09:46)  metoprolol succinate 25 mg oral tablet, extended release: 1 tab(s) orally once a day (12 Nov 2018 09:46)  valsartan 160 mg oral tablet: 1 tab(s) orally once a day (12 Nov 2018 09:46)  Xalatan 0.005% ophthalmic solution: 1 drop(s) to each affected eye once a day (in the evening) (12 Nov 2018 09:46)    24HRS EVENT:  Neuro: Pain control with Tylenol and Oxycodone PRN.   Repiratory: RA satting well   Cardio: Restarted home meds: Metorprolol, losartan, ASA and simvastatin. CE x1 negative. Pending 2 more sets. EKG unchanged from pre op. Patient had a right radial a line post op, it was bothering her causing patient a lot of pain. A line was removed and not replaced.    GI: Advanced to clear diet. Once tolerating Po patient can be IV locked. PPI ppx. Bowel regimen. Zofran PRN  : No scott intraop, failed TOV w/ bladder scan >500cc, Scott placed. started flomax.K 3.5, 3 K riders given. Follow up 23:30 BMP   Heme: DVT ppx with SCD. Neurovascular checks @ 1 hour. No hep sub q as per primary team  Endocrine: Continue home levothyroxine     DVT PTX: SCD     GI PTX: PPI    ***Tubes/Lines/Drains  ***  Peripheral IV    REVIEW OF SYSTEMS    [x] A ten-point review of systems was otherwise negative except as noted. SHAE HENRIQUEZ  [id number]  78y Female    Indication for ICU admission: s/p L CEA   Admit Date:11-12-18  ICU Date: 11-  OR Date: 11-    HPI  78y female with a PMHx of CAD, stent placement 2010, DEXTER, hypothyroidism, HTN, asthma, and glaucoma found to have bilateral high grade stenosis on cardiology screening. Patient was asymptomatic. Patient came for elective R CEA in August 2018, patient had a hematoma after this procedure and was taken back to the OR for evacuation. Today she is s/p L CEA. Case was uneventful. She remained stable throughout the case. Patient will be upgraded to SICU for overnight neurovascular monitoring.     erythromycin (Rash)  latex (Rash)  parsley (Headache)  penicillins (Rash)    PAST MEDICAL & SURGICAL HISTORY:  Asthma: cant remember last flare up  Obstructive sleep apnea on CPAP  Macular degeneration  CAD (coronary artery disease) of bypass graft: + AVR pig  Hypothyroid  HTN (hypertension)  Glaucoma  CAD (coronary artery disease)  History of surgery: Right CEA 9-18  per pt complication post op bleeding  History of hip replacement, total, right  S/P partial thyroidectomy  S/P cholecystectomy  CAD (coronary artery disease) of bypass graft: 3v avr    Home Medications:  Alphagan 0.2% ophthalmic solution: 1 drop(s) to each affected eye 2 times a day (12 Nov 2018 09:46)  aspirin 81 mg oral tablet: 1 tab(s) orally once a day (12 Nov 2018 09:46)  Crestor 20 mg oral tablet: 1 tab(s) orally once a day (at bedtime) (12 Nov 2018 09:46)  levothyroxine 125 mcg (0.125 mg) oral tablet: 1 tab(s) orally once a day (12 Nov 2018 09:46)  metoprolol succinate 25 mg oral tablet, extended release: 1 tab(s) orally once a day (12 Nov 2018 09:46)  valsartan 160 mg oral tablet: 1 tab(s) orally once a day (12 Nov 2018 09:46)  Xalatan 0.005% ophthalmic solution: 1 drop(s) to each affected eye once a day (in the evening) (12 Nov 2018 09:46)    24HRS EVENT:  Neuro: Pain control with Tylenol and Oxycodone PRN.   Repiratory: RA satting well   Cardio: Restarted home meds: Metorprolol, losartan, ASA and simvastatin. CE x1 negative. Pending 2 more sets. EKG unchanged from pre op. Patient had a right radial a line post op, it was bothering her causing patient a lot of pain. A line was removed and not replaced.    GI: Advanced to clear diet. Once tolerating Po patient can be IV locked. PPI ppx. Bowel regimen. Zofran PRN  : No scott intraop, failed TOV w/ bladder scan >500cc, Scott placed. started flomax.K 3.5, 3 K riders given. Follow up 23:30 BMP   Heme: DVT ppx with SCD. Neurovascular checks @ 1 hour. No hep sub q as per primary team  Endocrine: Continue home levothyroxine     DVT PTX: SCD     GI PTX: PPI    ***Tubes/Lines/Drains  ***  Peripheral IV    REVIEW OF SYSTEMS  [x] A ten-point review of systems was otherwise negative except as noted. SHAE HENRIQUEZ  [id number]  78y Female    Indication for ICU admission: s/p L CEA   Admit Date:18  ICU Date: 2018  OR Date: 2018    HPI  78y female with a PMHx of CAD, stent placement , DEXTRE, hypothyroidism, HTN, asthma, and glaucoma found to have bilateral high grade stenosis on cardiology screening. Patient was asymptomatic. Patient came for elective R CEA in 2018, patient had a hematoma after this procedure and was taken back to the OR for evacuation. Today she is s/p L CEA. Case was uneventful. She remained stable throughout the case. Patient will be upgraded to SICU for overnight neurovascular monitoring.     erythromycin (Rash)  latex (Rash)  parsley (Headache)  penicillins (Rash)    PAST MEDICAL & SURGICAL HISTORY:  Asthma: cant remember last flare up  Obstructive sleep apnea on CPAP  Macular degeneration  CAD (coronary artery disease) of bypass graft: + AVR pig  Hypothyroid  HTN (hypertension)  Glaucoma  CAD (coronary artery disease)  History of surgery: Right CEA 9-18  per pt complication post op bleeding  History of hip replacement, total, right  S/P partial thyroidectomy  S/P cholecystectomy  CAD (coronary artery disease) of bypass graft: 3v avr    Home Medications:  Alphagan 0.2% ophthalmic solution: 1 drop(s) to each affected eye 2 times a day (2018 09:46)  aspirin 81 mg oral tablet: 1 tab(s) orally once a day (2018 09:46)  Crestor 20 mg oral tablet: 1 tab(s) orally once a day (at bedtime) (2018 09:46)  levothyroxine 125 mcg (0.125 mg) oral tablet: 1 tab(s) orally once a day (2018 09:46)  metoprolol succinate 25 mg oral tablet, extended release: 1 tab(s) orally once a day (2018 09:46)  valsartan 160 mg oral tablet: 1 tab(s) orally once a day (2018 09:46)  Xalatan 0.005% ophthalmic solution: 1 drop(s) to each affected eye once a day (in the evening) (2018 09:46)    24HRS EVENT:  Neuro: Pain control with Tylenol and Oxycodone PRN.   Repiratory: RA satting well   Cardio: Restarted home meds: Metorprolol, losartan, ASA and simvastatin. CE x1 negative. Pending 2 more sets. EKG unchanged from pre op. Patient had a right radial a line post op, it was bothering her causing patient a lot of pain. A line was removed and not replaced.    GI: Advanced to clear diet. Once tolerating Po patient can be IV locked. PPI ppx. Bowel regimen. Zofran PRN  : No scott intraop, failed TOV w/ bladder scan >500cc, Scott placed. started flomax.K 3.5, 3 K riders given. Follow up 23:30 BMP   Heme: DVT ppx with SCD. Neurovascular checks @ 1 hour. No hep sub q as per primary team  Endocrine: Continue home levothyroxine     DVT PTX: SCD     GI PTX: PPI    ***Tubes/Lines/Drains  ***  Peripheral IV    REVIEW OF SYSTEMS  [x] A ten-point review of systems was otherwise negative except as noted.  Daily Height in cm: 154.94 (2018 20:30)    Daily Weight in k.3 (2018 20:30)    Diet, DASH/TLC:   Sodium & Cholesterol Restricted (18 @ 08:22)      CURRENT MEDS:  Neurologic Medications  acetaminophen   Tablet .. 650 milliGRAM(s) Oral every 4 hours PRN Mild Pain (1 - 3)  oxyCODONE    IR 5 milliGRAM(s) Oral every 4 hours PRN mod-severe pain    Respiratory Medications    Cardiovascular Medications  losartan 100 milliGRAM(s) Oral daily  metoprolol succinate ER 25 milliGRAM(s) Oral daily  tamsulosin 0.4 milliGRAM(s) Oral at bedtime    Gastrointestinal Medications  docusate sodium 100 milliGRAM(s) Oral every 8 hours  pantoprazole    Tablet 40 milliGRAM(s) Oral before breakfast  senna 1 Tablet(s) Oral daily    Genitourinary Medications    Hematologic/Oncologic Medications  aspirin enteric coated 81 milliGRAM(s) Oral daily    Antimicrobial/Immunologic Medications    Endocrine/Metabolic Medications  atorvastatin 80 milliGRAM(s) Oral at bedtime  levothyroxine 125 MICROGram(s) Oral daily    Topical/Other Medications  brimonidine 0.2% Ophthalmic Solution 1 Drop(s) Both EYES two times a day  chlorhexidine 4% Liquid 1 Application(s) Topical <User Schedule>  latanoprost 0.005% Ophthalmic Solution 1 Drop(s) Both EYES at bedtime      ICU Vital Signs Last 24 Hrs  T(C): 36.6 (2018 08:00), Max: 37 (2018 14:34)  T(F): 97.9 (2018 08:00), Max: 98.6 (2018 14:34)  HR: 58 (2018 08:00) (58 - 82)  BP: 116/46 (2018 08:00) (96/74 - 162/90)  BP(mean): 73 (2018 08:00) (63 - 99)  ABP: 149/59 (2018 14:34) (130/54 - 192/82)  ABP(mean): 91 (2018 14:34) (80 - 121)  RR: 16 (2018 08:00) (13 - 21)  SpO2: 94% (2018 08:20) (94% - 100%)      Adult Advanced Hemodynamics Last 24 Hrs  CVP(mm Hg): --  CVP(cm H2O): --  CO: --  CI: --  PA: --  PA(mean): --  PCWP: --  SVR: --  SVRI: --  PVR: --  PVRI: --      ABG - ( 2018 14:30 )  pH, Arterial: 7.42  pH, Blood: x     /  pCO2: 46    /  pO2: 135   / HCO3: 29    / Base Excess: 4.3   /  SaO2: 99                  I&O's Summary    2018 07:  -  2018 07:00  --------------------------------------------------------  IN: 2005 mL / OUT: 1230 mL / NET: 775 mL    2018 07:  -  2018 08:23  --------------------------------------------------------  IN: 100 mL / OUT: 0 mL / NET: 100 mL      I&O's Detail    2018 07:  -  2018 07:00  --------------------------------------------------------  IN:    IV PiggyBack: 100 mL    lactated ringers.: 150 mL    Oral Fluid: 155 mL    sodium chloride 0.9%: 1600 mL  Total IN: 2005 mL    OUT:    Indwelling Catheter - Urethral: 1230 mL  Total OUT: 1230 mL    Total NET: 775 mL      2018 07:01  -  2018 08:23  --------------------------------------------------------  IN:    sodium chloride 0.9%: 100 mL  Total IN: 100 mL    OUT:  Total OUT: 0 mL    Total NET: 100 mL          PHYSICAL EXAM:  General/Neuro: A&Ox3. No focal deficits. Follows commands. PERRL.     Respiratory  Exam: Lungs clear to auscultation, Normal expansion/effort.      Cardiovascular  Exam: S1, S2.  Regular rate and rhythm.   Cardiac Rhythm: Normal Sinus Rhythm  Wound: L CEA. Dressing is clean dry and intact. No evidence of hematoma.   GI  Exam: Abdomen soft, Non-tender, Non-distended. Wound:   ***  Current Diet:  Slowly advance to clears.     Extremities  Exam: Extremities warm, pink, well-perfused.  Pulses palpable throughout.     Derm:  Exam: Good skin turgor, no skin breakdown.       :   Exam: Scott in place, for Urinary retention.    LABS:  CAPILLARY BLOOD GLUCOSE               10.1   7.91  )-----------( 180      ( 2018 00:32 )             31.1       11-13    140  |  100  |  22<H>  ----------------------------<  105<H>  4.9   |  26  |  1.0    Ca    8.5      2018 00:32  Phos  4.8     11-13  Mg     1.9         PT/INR - ( 2018 00:32 )   PT: 11.10 sec;   INR: 0.96 ratio      PTT - ( 2018 00:32 )  PTT:23.8 sec  CARDIAC MARKERS ( 2018 04:37 )  x     / <0.01 ng/mL / 130 U/L / x     / 3.4 ng/mL  CARDIAC MARKERS ( 2018 20:25 )  x     / <0.01 ng/mL / 70 U/L / x     / 2.1 ng/mL  CARDIAC MARKERS ( 2018 13:39 )  x     / <0.01 ng/mL / 68 U/L / x     / 2.2 ng/mL

## 2018-11-13 NOTE — PROGRESS NOTE ADULT - ASSESSMENT
s/p L CEA    - ICU for q1 neuro checks    - clear diet this afternoon    - IS    - Monitor for HA, risk of reperfusion syndrome,    - goal sbp 100-140   - IS, ambulate   - ASA only.

## 2018-11-13 NOTE — DISCHARGE NOTE ADULT - CARE PROVIDER_API CALL
Junior Bonilla), Vascular Surgery  [address] [place] [address]  Phone: [phone]  Fax: [phone]

## 2018-11-13 NOTE — DISCHARGE NOTE ADULT - PLAN OF CARE
Complete Recovery 1. Continue using all home medications. Use Tylenols for mild pain. Do not drive for 4 weeks.  2. Continue regular diet and fluid intake as tolerated.  3. Ambulate as tolerated and use incentive spirometer (10x/hour) when awake.  4. Dressing can be off on Saturday. Leave Steri-strips and let them fall off.  5. Shower is okay to take.  6. Do not lift heavy weight (10 lbs or more) for 4-6 weeks.  7. Follow-up with Dr. Bonilla in his Clinic in 1-2 weeks and call [phone] to schedule your appointment.  8. In case of any worsening of symptoms/signs, please go to nearby Emergency Department or call [phone].

## 2018-11-14 LAB — SURGICAL PATHOLOGY STUDY: SIGNIFICANT CHANGE UP

## 2018-11-20 DIAGNOSIS — J45.909 UNSPECIFIED ASTHMA, UNCOMPLICATED: ICD-10-CM

## 2018-11-20 DIAGNOSIS — I65.22 OCCLUSION AND STENOSIS OF LEFT CAROTID ARTERY: ICD-10-CM

## 2018-11-20 DIAGNOSIS — Z91.018 ALLERGY TO OTHER FOODS: ICD-10-CM

## 2018-11-20 DIAGNOSIS — I10 ESSENTIAL (PRIMARY) HYPERTENSION: ICD-10-CM

## 2018-11-20 DIAGNOSIS — I25.10 ATHEROSCLEROTIC HEART DISEASE OF NATIVE CORONARY ARTERY WITHOUT ANGINA PECTORIS: ICD-10-CM

## 2018-11-20 DIAGNOSIS — Z91.040 LATEX ALLERGY STATUS: ICD-10-CM

## 2018-11-20 DIAGNOSIS — Z88.1 ALLERGY STATUS TO OTHER ANTIBIOTIC AGENTS STATUS: ICD-10-CM

## 2018-11-20 DIAGNOSIS — Z96.641 PRESENCE OF RIGHT ARTIFICIAL HIP JOINT: ICD-10-CM

## 2018-11-20 DIAGNOSIS — G47.33 OBSTRUCTIVE SLEEP APNEA (ADULT) (PEDIATRIC): ICD-10-CM

## 2018-11-20 DIAGNOSIS — Z95.1 PRESENCE OF AORTOCORONARY BYPASS GRAFT: ICD-10-CM

## 2018-11-20 DIAGNOSIS — E03.9 HYPOTHYROIDISM, UNSPECIFIED: ICD-10-CM

## 2018-11-20 DIAGNOSIS — Z88.0 ALLERGY STATUS TO PENICILLIN: ICD-10-CM

## 2018-11-20 DIAGNOSIS — H40.9 UNSPECIFIED GLAUCOMA: ICD-10-CM

## 2018-11-20 PROBLEM — I25.810 ATHEROSCLEROSIS OF CORONARY ARTERY BYPASS GRAFT(S) WITHOUT ANGINA PECTORIS: Chronic | Status: ACTIVE | Noted: 2018-08-01

## 2018-11-27 ENCOUNTER — APPOINTMENT (OUTPATIENT)
Dept: VASCULAR SURGERY | Facility: CLINIC | Age: 78
End: 2018-11-27
Payer: COMMERCIAL

## 2018-11-27 PROCEDURE — 99024 POSTOP FOLLOW-UP VISIT: CPT

## 2019-01-08 ENCOUNTER — APPOINTMENT (OUTPATIENT)
Dept: VASCULAR SURGERY | Facility: CLINIC | Age: 79
End: 2019-01-08
Payer: COMMERCIAL

## 2019-01-08 PROCEDURE — 93880 EXTRACRANIAL BILAT STUDY: CPT

## 2019-01-08 PROCEDURE — 99024 POSTOP FOLLOW-UP VISIT: CPT

## 2019-01-08 NOTE — DATA REVIEWED
[FreeTextEntry1] : carotid duplex carotid duplex on the [facility] endarterectomy site widely patent on the left peak systolic velocitis to 29 over end-diastolic velocity 92

## 2019-01-08 NOTE — HISTORY OF PRESENT ILLNESS
[FreeTextEntry1] : The patient is a 78-year-old female with high-grade bilateral ICA stenosis who underwent a right carotid endarterectomy in August of 2018 and most recently in November 12, 2018 underwent a left carotid endarterectomy. Patient presents for a postoperative followup duplex. She denies any new symptoms. She tolerated the procedure well.

## 2019-01-08 NOTE — ASSESSMENT
[FreeTextEntry1] : The patient is a 78-year-old female who is 2 months status post left internal carotid artery endarterectomy in 6 months followup right internal carotid endarterectomy. The patient has some intimal hyperplasia visualized in her left internal carotid artery. I would like to monitor closely in followup with serial duplex exam in 2 months time for repeat carotid duplex.

## 2019-04-02 ENCOUNTER — APPOINTMENT (OUTPATIENT)
Dept: VASCULAR SURGERY | Facility: CLINIC | Age: 79
End: 2019-04-02
Payer: COMMERCIAL

## 2019-04-02 VITALS
HEIGHT: 61 IN | SYSTOLIC BLOOD PRESSURE: 116 MMHG | DIASTOLIC BLOOD PRESSURE: 70 MMHG | WEIGHT: 175 LBS | BODY MASS INDEX: 33.04 KG/M2

## 2019-04-02 PROCEDURE — 93880 EXTRACRANIAL BILAT STUDY: CPT

## 2019-04-02 PROCEDURE — 99213 OFFICE O/P EST LOW 20 MIN: CPT

## 2019-04-02 NOTE — DATA REVIEWED
[FreeTextEntry1] : I performed a carotid duplex which was medically necessary to evaluate for patency of the carotid endarterectomy site. It showed patent right CEA site and flow disturbance at the left carotid endarterectomy site with velocity of 190 cm/s that is improved from 2 months ago ( 230 cm/s in January 2019).\par

## 2019-04-02 NOTE — ASSESSMENT
[FreeTextEntry1] : 78 year-old female with high-grade bilateral ICA stenosis who underwent a right carotid endarterectomy in August of 2018 and in November 12, 2018, she underwent a left carotid endarterectomy. She had a postoperative  duplex that showed intimal hyperplasia of the left carotid endarterectomy site.\par She presents today for a repeat Carotid duplex to monitor her disease, denies any CVA or TIA symptoms. I performed a carotid duplex which was medically necessary to evaluate for patency of the carotid endarterectomy sites. It showed patent right CEA site and flow disturbance at the left carotid endarterectomy site with velocity of 190 cm/s that is improved from 2 months ago (230 cm/s in January 2019).\par  I have informed her of the test results, no surgical intervention is needed at this time. I will see her back in six months time for a repeat carotid duplex.

## 2019-04-02 NOTE — HISTORY OF PRESENT ILLNESS
[FreeTextEntry1] : 78 year-old female with high-grade bilateral ICA stenosis who underwent a right carotid endarterectomy in August of 2018 and in November 12, 2018, she underwent a left carotid endarterectomy. She had a postoperative  duplex that showed intimal hyperplasia of the left carotid endarterectomy site.\par She presents today for a repeat Carotid duplex to monitor her disease, denies any CVA or TIA symptoms.

## 2019-07-27 ENCOUNTER — OUTPATIENT (OUTPATIENT)
Dept: OUTPATIENT SERVICES | Facility: HOSPITAL | Age: 79
LOS: 1 days | Discharge: HOME | End: 2019-07-27

## 2019-07-27 DIAGNOSIS — Z98.890 OTHER SPECIFIED POSTPROCEDURAL STATES: Chronic | ICD-10-CM

## 2019-07-27 DIAGNOSIS — I25.810 ATHEROSCLEROSIS OF CORONARY ARTERY BYPASS GRAFT(S) WITHOUT ANGINA PECTORIS: Chronic | ICD-10-CM

## 2019-07-27 DIAGNOSIS — Z96.641 PRESENCE OF RIGHT ARTIFICIAL HIP JOINT: Chronic | ICD-10-CM

## 2019-07-27 DIAGNOSIS — I10 ESSENTIAL (PRIMARY) HYPERTENSION: ICD-10-CM

## 2019-07-27 DIAGNOSIS — E89.0 POSTPROCEDURAL HYPOTHYROIDISM: Chronic | ICD-10-CM

## 2019-07-27 DIAGNOSIS — E78.5 HYPERLIPIDEMIA, UNSPECIFIED: ICD-10-CM

## 2019-07-27 DIAGNOSIS — E03.9 HYPOTHYROIDISM, UNSPECIFIED: ICD-10-CM

## 2019-07-27 DIAGNOSIS — Z90.49 ACQUIRED ABSENCE OF OTHER SPECIFIED PARTS OF DIGESTIVE TRACT: Chronic | ICD-10-CM

## 2019-07-27 DIAGNOSIS — E11.9 TYPE 2 DIABETES MELLITUS WITHOUT COMPLICATIONS: ICD-10-CM

## 2019-10-08 ENCOUNTER — APPOINTMENT (OUTPATIENT)
Dept: VASCULAR SURGERY | Facility: CLINIC | Age: 79
End: 2019-10-08
Payer: COMMERCIAL

## 2019-10-08 PROCEDURE — 99213 OFFICE O/P EST LOW 20 MIN: CPT

## 2019-10-08 PROCEDURE — 93880 EXTRACRANIAL BILAT STUDY: CPT

## 2019-10-08 NOTE — DATA REVIEWED
[FreeTextEntry1] : I performed a carotid duplex which was medically necessary to evaluate for patency of the carotid endarterectomy site. It showed patent right CEA site and flow disturbance at the left carotid endarterectomy site with velocity of 204  cm/s that is improved from 6 months ago ( 230 cm/s in January 2019).\par

## 2019-10-31 NOTE — ED ADULT NURSE NOTE - NS ED NURSE LEVEL OF CONSCIOUSNESS AFFECT
Refill request for warfarin.  Last seen 1/22/19;  Last filled 7/30/19.  Refilled per protocol.       Calm

## 2019-11-14 NOTE — PATIENT PROFILE ADULT. - EXTENSIONS OF SELF_ADULT
Eyeglasses Chonodrocutaneous Helical Advancement Flap Text: The defect edges were debeveled with a #15 scalpel blade.  Given the location of the defect and the proximity to free margins a chondrocutaneous helical advancement flap was deemed most appropriate.  Using a sterile surgical marker, the appropriate advancement flap was drawn incorporating the defect and placing the expected incisions within the relaxed skin tension lines where possible.    The area thus outlined was incised deep to adipose tissue with a #15 scalpel blade.  The skin margins were undermined to an appropriate distance in all directions utilizing iris scissors.

## 2019-11-30 NOTE — PATIENT PROFILE ADULT. - URINARY CATHETER
Sallie is a 44 year old female, complaining of tightness under right shoulder blade and throbbing pain that started a couple of days ago while she was at work. No trauma to the area that patient is aware of. Shoulder hurts worse when reaching upward.     Remedies attempted by patient include: heating pad, Ibuprofen         no

## 2019-12-16 NOTE — H&P PST ADULT - BACK
No deformity or limitation of movement Full Thickness Lip Wedge Repair (Flap) Text: Given the location of the defect and the proximity to free margins a full thickness wedge repair was deemed most appropriate.  Using a sterile surgical marker, the appropriate repair was drawn incorporating the defect and placing the expected incisions perpendicular to the vermillion border.  The vermillion border was also meticulously outlined to ensure appropriate reapproximation during the repair.  The area thus outlined was incised through and through with a #15 scalpel blade.  The muscularis and dermis were reaproximated with deep sutures following hemostasis. Care was taken to realign the vermillion border before proceeding with the superficial closure.  Once the vermillion was realigned the superfical and mucosal closure was finished.

## 2020-01-21 ENCOUNTER — APPOINTMENT (OUTPATIENT)
Dept: PLASTIC SURGERY | Facility: CLINIC | Age: 80
End: 2020-01-21
Payer: MEDICARE

## 2020-01-21 VITALS — HEIGHT: 61 IN | BODY MASS INDEX: 33.04 KG/M2 | WEIGHT: 175 LBS

## 2020-01-21 DIAGNOSIS — M25.531 PAIN IN RIGHT WRIST: ICD-10-CM

## 2020-01-21 DIAGNOSIS — Z86.69 PERSONAL HISTORY OF OTHER DISEASES OF THE NERVOUS SYSTEM AND SENSE ORGANS: ICD-10-CM

## 2020-01-21 PROCEDURE — 99203 OFFICE O/P NEW LOW 30 MIN: CPT

## 2020-01-21 NOTE — ASSESSMENT
[FreeTextEntry1] : 80 y/o F with right dorsal wrist subcutaneous lesion\par Hx Dupuytren's s/p needle fasciotomy, asymptomatic\par \par -Right wrist XR\par \par as above\par also w very intermittent right 5th finger triggering--not in a few weeks\par offered steroid injection should sxs occur more frequently but for time being steroid inj not indicated\par \par f/u p xrays

## 2020-01-21 NOTE — HISTORY OF PRESENT ILLNESS
[FreeTextEntry1] : Pt is an 78 y/o F, RHD, with PMH of CAD s/p CABG x3 and AVR, HTN, HLD, subtotal thyroidectomy (benign mass), and BL CEA who presents for evaluation of dorsal right wrist mass x6 months. Feels it began after accidentally hitting wrist. Denies pain unless touched. Denies paraesthesias. No XRs.\par \par Reports hx of right hand Dupuytren's s/p needle fasciotomy by Dr. Jovel 5 years ago, no asymptomatic.\par \par Nonsmoker, nondiabetic

## 2020-01-21 NOTE — PHYSICAL EXAM
[de-identified] : well-appearing, NAD [de-identified] : Right dorsal wrist with firm 0.5cm subcutaneous mass, slightly mobile, tender upon deep palpation, SILT r/u/m, FROM\par right palm with cord over 4th MC, normal tabletop test

## 2020-02-10 NOTE — ASU PATIENT PROFILE, ADULT - NSSUBSTANCEUSE_GEN_ALL_CORE_SD
Unable to get patient off bipap. Patient wearing almost continuous. abg slightly worse. Blood count dropped. Call Dr. Lucía Manzo with above and asked for transfer order. Received. Called Dr. Florence Alonso and made aware. never used

## 2020-03-10 ENCOUNTER — FORM ENCOUNTER (OUTPATIENT)
Age: 80
End: 2020-03-10

## 2020-03-11 ENCOUNTER — OUTPATIENT (OUTPATIENT)
Dept: OUTPATIENT SERVICES | Facility: HOSPITAL | Age: 80
LOS: 1 days | Discharge: HOME | End: 2020-03-11
Payer: MEDICARE

## 2020-03-11 DIAGNOSIS — Z98.890 OTHER SPECIFIED POSTPROCEDURAL STATES: Chronic | ICD-10-CM

## 2020-03-11 DIAGNOSIS — M25.531 PAIN IN RIGHT WRIST: ICD-10-CM

## 2020-03-11 DIAGNOSIS — E89.0 POSTPROCEDURAL HYPOTHYROIDISM: Chronic | ICD-10-CM

## 2020-03-11 DIAGNOSIS — I25.810 ATHEROSCLEROSIS OF CORONARY ARTERY BYPASS GRAFT(S) WITHOUT ANGINA PECTORIS: Chronic | ICD-10-CM

## 2020-03-11 DIAGNOSIS — Z96.641 PRESENCE OF RIGHT ARTIFICIAL HIP JOINT: Chronic | ICD-10-CM

## 2020-03-11 DIAGNOSIS — Z90.49 ACQUIRED ABSENCE OF OTHER SPECIFIED PARTS OF DIGESTIVE TRACT: Chronic | ICD-10-CM

## 2020-03-11 PROCEDURE — 73110 X-RAY EXAM OF WRIST: CPT | Mod: 26,RT

## 2020-06-18 ENCOUNTER — APPOINTMENT (OUTPATIENT)
Dept: PLASTIC SURGERY | Facility: CLINIC | Age: 80
End: 2020-06-18
Payer: MEDICARE

## 2020-06-18 PROCEDURE — 99212 OFFICE O/P EST SF 10 MIN: CPT

## 2020-06-18 NOTE — HISTORY OF PRESENT ILLNESS
[FreeTextEntry1] : Pt is an 80 y/o F, RHD, with PMH of CAD s/p CABG x3 and AVR, HTN, HLD, subtotal thyroidectomy (benign mass), and BL CEA who presents for evaluation of dorsal right wrist mass x6 months. Feels it began after accidentally hitting wrist. Denies pain unless touched. Denies paraesthesias. No XRs.\par \par Reports hx of right hand Dupuytren's s/p needle fasciotomy by Dr. Jovel 5 years ago, no asymptomatic.\par \par Nonsmoker, nondiabetic

## 2020-06-18 NOTE — PHYSICAL EXAM
[de-identified] : well-appearing, NAD [de-identified] : Right dorsal wrist with firm 0.5cm subcutaneous mass, slightly mobile, tender upon deep palpation, SILT r/u/m, FROM\par right palm with cord over 4th MC, normal tabletop test

## 2020-06-18 NOTE — ASSESSMENT
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

## 2020-06-30 ENCOUNTER — APPOINTMENT (OUTPATIENT)
Dept: VASCULAR SURGERY | Facility: CLINIC | Age: 80
End: 2020-06-30
Payer: MEDICARE

## 2020-06-30 VITALS — SYSTOLIC BLOOD PRESSURE: 118 MMHG | DIASTOLIC BLOOD PRESSURE: 78 MMHG

## 2020-06-30 PROCEDURE — 99213 OFFICE O/P EST LOW 20 MIN: CPT

## 2020-06-30 PROCEDURE — 93880 EXTRACRANIAL BILAT STUDY: CPT

## 2020-06-30 NOTE — DATA REVIEWED
[FreeTextEntry1] : I performed a carotid duplex which was medically necessary to evaluate for patency of the carotid endarterectomy site. It showed patent right CEA site and flow disturbance at the left carotid endarterectomy site with velocity of 152 cm/s.

## 2020-06-30 NOTE — ASSESSMENT
[FreeTextEntry1] : 80 year-old female with high-grade bilateral ICA stenosis who underwent a right carotid endarterectomy in August of 2018 and in November 12, 2018, she underwent a left carotid endarterectomy. She had a postoperative  duplex that showed intimal hyperplasia of the left carotid endarterectomy site.\par Carotid duplex today showed patent right CEA site and flow disturbance at the left carotid endarterectomy site with peak systolic velocity of 152 cm/s.\par  I have informed her of the test results, no surgical intervention is needed at this time. I will see her back in six months time for a repeat carotid duplex.

## 2020-06-30 NOTE — HISTORY OF PRESENT ILLNESS
[FreeTextEntry1] : 80 year-old female with high-grade bilateral ICA stenosis who underwent a right carotid endarterectomy in August of 2018 and in November 12, 2018, she underwent a left carotid endarterectomy. She had a postoperative  duplex that showed intimal hyperplasia of the left carotid endarterectomy site.\par She presents today for a repeat Carotid duplex to monitor her disease, denies any CVA or TIA symptoms.

## 2020-07-22 NOTE — H&P PST ADULT - NS PRO FEM REPRO HEALTH SCREEN
Quality 130: Documentation Of Current Medications In The Medical Record: Current Medications Documented
Detail Level: Detailed
mammogram

## 2021-01-12 ENCOUNTER — APPOINTMENT (OUTPATIENT)
Dept: VASCULAR SURGERY | Facility: CLINIC | Age: 81
End: 2021-01-12
Payer: MEDICARE

## 2021-01-12 VITALS — DIASTOLIC BLOOD PRESSURE: 62 MMHG | SYSTOLIC BLOOD PRESSURE: 133 MMHG

## 2021-01-12 VITALS — WEIGHT: 170 LBS | BODY MASS INDEX: 32.12 KG/M2

## 2021-01-12 VITALS — TEMPERATURE: 97 F

## 2021-01-12 DIAGNOSIS — I65.23 OCCLUSION AND STENOSIS OF BILATERAL CAROTID ARTERIES: ICD-10-CM

## 2021-01-12 PROCEDURE — 99072 ADDL SUPL MATRL&STAF TM PHE: CPT

## 2021-01-12 PROCEDURE — 93880 EXTRACRANIAL BILAT STUDY: CPT

## 2021-01-12 PROCEDURE — 99213 OFFICE O/P EST LOW 20 MIN: CPT

## 2021-03-23 ENCOUNTER — APPOINTMENT (OUTPATIENT)
Dept: CARDIOLOGY | Facility: CLINIC | Age: 81
End: 2021-03-23
Payer: MEDICARE

## 2021-03-23 VITALS
WEIGHT: 176 LBS | SYSTOLIC BLOOD PRESSURE: 100 MMHG | DIASTOLIC BLOOD PRESSURE: 64 MMHG | TEMPERATURE: 97.2 F | HEIGHT: 61 IN | HEART RATE: 56 BPM | BODY MASS INDEX: 33.23 KG/M2

## 2021-03-23 DIAGNOSIS — Z13.6 ENCOUNTER FOR SCREENING FOR CARDIOVASCULAR DISORDERS: ICD-10-CM

## 2021-03-23 PROCEDURE — 93000 ELECTROCARDIOGRAM COMPLETE: CPT

## 2021-03-23 PROCEDURE — 99204 OFFICE O/P NEW MOD 45 MIN: CPT

## 2021-03-23 PROCEDURE — 99072 ADDL SUPL MATRL&STAF TM PHE: CPT

## 2021-03-23 RX ORDER — METOPROLOL SUCCINATE 25 MG/1
25 TABLET, EXTENDED RELEASE ORAL
Refills: 0 | Status: DISCONTINUED | COMMUNITY
End: 2021-03-23

## 2021-03-23 RX ORDER — ACETAMINOPHEN 500 MG/1
500 TABLET ORAL
Refills: 0 | Status: DISCONTINUED | COMMUNITY
End: 2021-03-23

## 2021-03-23 RX ORDER — TIMOLOL MALEATE 2.5 MG/ML
0.25 SOLUTION/ DROPS OPHTHALMIC
Refills: 0 | Status: DISCONTINUED | COMMUNITY
End: 2021-03-23

## 2021-03-23 RX ORDER — LATANOPROST/PF 0.005 %
0.01 DROPS OPHTHALMIC (EYE)
Refills: 0 | Status: DISCONTINUED | COMMUNITY
End: 2021-03-23

## 2021-03-23 RX ORDER — LEVOTHYROXINE SODIUM 0.12 MG/1
125 TABLET ORAL
Refills: 0 | Status: DISCONTINUED | COMMUNITY
End: 2021-03-23

## 2021-03-23 RX ORDER — ASPIRIN 81 MG
81 TABLET, DELAYED RELEASE (ENTERIC COATED) ORAL
Refills: 0 | Status: DISCONTINUED | COMMUNITY
End: 2021-03-23

## 2021-03-23 RX ORDER — ROSUVASTATIN CALCIUM 5 MG/1
TABLET, FILM COATED ORAL
Refills: 0 | Status: DISCONTINUED | COMMUNITY
End: 2021-03-23

## 2021-03-23 RX ORDER — DOCUSATE SODIUM 100 MG/1
TABLET ORAL
Refills: 0 | Status: DISCONTINUED | COMMUNITY
End: 2021-03-23

## 2021-03-23 RX ORDER — LATANOPROST/PF 0.005 %
0.01 DROPS OPHTHALMIC (EYE)
Refills: 6 | Status: ACTIVE | COMMUNITY

## 2021-03-23 RX ORDER — BRIMONIDINE TARTRATE 0.2 %
DROPS OPHTHALMIC (EYE)
Refills: 0 | Status: DISCONTINUED | COMMUNITY
End: 2021-03-23

## 2021-03-23 RX ORDER — VALSARTAN 40 MG/1
TABLET, COATED ORAL
Refills: 0 | Status: DISCONTINUED | COMMUNITY
End: 2021-03-23

## 2021-03-23 RX ORDER — ASPIRIN 81 MG
81 TABLET, DELAYED RELEASE (ENTERIC COATED) ORAL DAILY
Refills: 0 | Status: ACTIVE | COMMUNITY

## 2021-03-23 NOTE — HISTORY OF PRESENT ILLNESS
[FreeTextEntry1] : 79 yo F with CAD s/p CABG / AVR, former patient of Dr. Fox presents to establish care.  Records reviewed.  Doing very well.  No complaints.\par \par \par EKG (3/23/21):  SB 56 bpm, no ST-T changes\par NST (7/2019):  No evidence of stress-induced ischemia\par

## 2021-03-23 NOTE — ASSESSMENT
[FreeTextEntry1] : CAD s/p CABG / AVR\par - Stable\par - Continue Aspirin, Rosuvastatin, Metoprolol\par - Antibiotic prophylaxis is required prior to teeth cleaning / procedures\par \par Hypertension\par - Controlled\par - Continue Valsartan 160 mg daily\par \par Records and imaging reviewed\par \par Follow up 6 months

## 2021-03-24 ENCOUNTER — RESULT REVIEW (OUTPATIENT)
Age: 81
End: 2021-03-24

## 2021-07-21 ENCOUNTER — APPOINTMENT (OUTPATIENT)
Dept: CARDIOLOGY | Facility: CLINIC | Age: 81
End: 2021-07-21
Payer: MEDICARE

## 2021-07-21 VITALS
HEART RATE: 60 BPM | DIASTOLIC BLOOD PRESSURE: 70 MMHG | TEMPERATURE: 97.5 F | SYSTOLIC BLOOD PRESSURE: 130 MMHG | BODY MASS INDEX: 33.44 KG/M2 | WEIGHT: 177 LBS

## 2021-07-21 PROCEDURE — 99072 ADDL SUPL MATRL&STAF TM PHE: CPT

## 2021-07-21 PROCEDURE — 93000 ELECTROCARDIOGRAM COMPLETE: CPT

## 2021-07-21 PROCEDURE — 99214 OFFICE O/P EST MOD 30 MIN: CPT

## 2021-07-21 RX ORDER — BRIMONIDINE TARTRATE 1.5 MG/ML
0.15 SOLUTION/ DROPS OPHTHALMIC DAILY
Refills: 0 | Status: DISCONTINUED | COMMUNITY
End: 2021-07-21

## 2021-07-21 RX ORDER — VALSARTAN 160 MG/1
160 TABLET, COATED ORAL DAILY
Refills: 0 | Status: DISCONTINUED | COMMUNITY
End: 2021-07-21

## 2021-07-21 NOTE — ASSESSMENT
[FreeTextEntry1] : CAD s/p CABG / AVR, NYHA Class I\par Hyperlipidemia with statin-induced myalgias\par \par BP is controlled\par Exam unremarkable\par \par Chol  263\par Trig  242\par HDL  54\par LDL  179\par \par If recurrent myalgias will change to Repatha\par Continue Aspirin, Rosuvastatin, Zetia, Metoprolol\par Antibiotic prophylaxis is required prior to procedures\par Continue Valsartan-HCTZ 160-12.5 mg daily\par Follow up 4 months

## 2021-07-21 NOTE — HISTORY OF PRESENT ILLNESS
[FreeTextEntry1] : 80 yo F with CAD s/p CABG / AVR, former patient of Dr. Fox presents to establish care.  Records reviewed.\par \par Lipids are elevated.  She stopped taking Rosuvastatin for a few weeks due to myalgias then restarted.  PCP prescribed Zetia.  Otherwise doing very well.  No complaints.\par \par \par EKG (7/21/2021):  NSR, low-voltage, no ST-T changes\par EKG (3/23/2021):  SB 56 bpm, no ST-T changes\par NST (7/2019):  No evidence of stress-induced ischemia\par \par Chol  263\par Trig  242\par HDL  54\par LDL  179\par

## 2021-08-03 ENCOUNTER — NON-APPOINTMENT (OUTPATIENT)
Age: 81
End: 2021-08-03

## 2021-09-27 ENCOUNTER — APPOINTMENT (OUTPATIENT)
Dept: ORTHOPEDIC SURGERY | Facility: CLINIC | Age: 81
End: 2021-09-27
Payer: MEDICARE

## 2021-09-27 ENCOUNTER — NON-APPOINTMENT (OUTPATIENT)
Age: 81
End: 2021-09-27

## 2021-09-27 VITALS — TEMPERATURE: 97.8 F

## 2021-09-27 DIAGNOSIS — M25.551 PAIN IN RIGHT HIP: ICD-10-CM

## 2021-09-27 PROCEDURE — 73522 X-RAY EXAM HIPS BI 3-4 VIEWS: CPT

## 2021-09-27 PROCEDURE — 99204 OFFICE O/P NEW MOD 45 MIN: CPT

## 2021-09-27 PROCEDURE — 99072 ADDL SUPL MATRL&STAF TM PHE: CPT

## 2021-09-27 NOTE — PHYSICAL EXAM
[de-identified] : Left Hip\par Inspection: No effusion\par Wounds: Healed incision about the left hip, no drainage or erythema\par Alignment: Normal\par Stability: No instability\par Palpation: No specific tenderness on palpation\par ROM: Flexion to 60 degrees, 30 degrees external rotation, 30 degrees abduction. \par Muscle Test: 5/5 All motor groups\par Leg examination: Calf is soft and non-tender.\par \par Right hip\par Inspection: No swelling or ecchymosis.\par Wounds: none.\par Palpation: non-tender.\par Stability: no instability.\par Strength: 5/5 all motor groups.\par ROM: Flexion to 60 degrees, 30 degrees external rotation, 30 degrees abduction. Groin pain. \par Leg length: equal. [de-identified] : Imaging done today, AP pelvis and lateral of both hips shows maintained superiolateral joint space with loss of the medial joint space of the right hip. 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

## 2021-09-27 NOTE — ASSESSMENT
[FreeTextEntry1] : 81 year old female s/p successful left total hip replacement presents with complaints of intermittent groin pain of the right hip. She reports being able to ambulate well most days. Occasional difficulty on some days. I therefore feel we should manage her conservatively for now. She will use a cane to ambulate and take Tylenol PRN. F/u in 6 months.

## 2022-03-24 NOTE — PHYSICAL EXAM
[de-identified] : Left Hip\par Inspection: No effusion\par Wounds: Healed incision about the left hip, no drainage or erythema\par Alignment: Normal\par Stability: No instability\par Palpation: No specific tenderness on palpation\par ROM: Flexion to 60 degrees, 30 degrees external rotation, 30 degrees abduction. \par Muscle Test: 5/5 All motor groups\par Leg examination: Calf is soft and non-tender.\par \par Right hip\par Inspection: No swelling or ecchymosis.\par Wounds: none.\par Palpation: non-tender.\par Stability: no instability.\par Strength: 5/5 all motor groups.\par ROM: Flexion to 60 degrees, 30 degrees external rotation, 30 degrees abduction. Groin pain. \par Leg length: equal. [de-identified] : Imaging done today, AP pelvis and lateral of both hips shows maintained superiolateral joint space with loss of the medial joint space of the right hip. 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

## 2022-03-24 NOTE — ASSESSMENT
[FreeTextEntry1] : 9/27/2021- 81 year old female s/p successful left total hip replacement presents with complaints of intermittent groin pain of the right hip. She reports being able to ambulate well most days. Occasional difficulty on some days. I therefore feel we should manage her conservatively for now. She will use a cane to ambulate and take Tylenol PRN. F/u in 6 months. \par \par 3/28/2022- S/p [facility] 2008

## 2022-03-28 ENCOUNTER — APPOINTMENT (OUTPATIENT)
Dept: ORTHOPEDIC SURGERY | Facility: CLINIC | Age: 82
End: 2022-03-28
Payer: MEDICARE

## 2022-03-30 ENCOUNTER — RESULT REVIEW (OUTPATIENT)
Age: 82
End: 2022-03-30

## 2022-04-25 ENCOUNTER — APPOINTMENT (OUTPATIENT)
Dept: ORTHOPEDIC SURGERY | Facility: CLINIC | Age: 82
End: 2022-04-25
Payer: MEDICARE

## 2022-04-25 DIAGNOSIS — M25.551 PAIN IN RIGHT HIP: ICD-10-CM

## 2022-04-25 PROCEDURE — 99214 OFFICE O/P EST MOD 30 MIN: CPT

## 2022-04-25 PROCEDURE — 73522 X-RAY EXAM HIPS BI 3-4 VIEWS: CPT

## 2022-04-25 PROCEDURE — 99072 ADDL SUPL MATRL&STAF TM PHE: CPT

## 2022-04-25 NOTE — ASSESSMENT
[FreeTextEntry1] : \par 9/27/2021 -  81 year old female s/p successful left total hip replacement presents with complaints of intermittent groin pain of the right hip. She reports being able to ambulate well most days. Occasional difficulty on some days. I therefore feel we should manage her conservatively for now. She will use a cane to ambulate and take Tylenol PRN. F/u in 6 months. \par \par 4/25/2022 - 81 year old female doing well s/p left total hip replacement. Her chief complaint today is pain lateral to the right lower lumbar area with radiation to the lateral hip, into the groin, and down the lateral thigh to the knee. 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 Her radiographs also show changes in both the lower lumbar spine and the right hip. To help differentiae the two we will send her for a diagnostic right hip injection with Marcaine and Kenalog. She will keep a diary in the first 5 hours of what pain goes away and what pain remains.

## 2022-04-25 NOTE — PHYSICAL EXAM
[de-identified] : Left Hip\par Inspection: No effusion\par Wounds: Healed incision about the left hip, no drainage or erythema\par Alignment: Normal\par Stability: No instability\par Palpation: No specific tenderness on palpation\par ROM: Flexion to 60 degrees, 30 degrees external rotation, 30 degrees abduction. \par Muscle Test: 5/5 All motor groups\par Leg examination: Calf is soft and non-tender.\par \par Right hip\par Inspection: No swelling or ecchymosis.\par Wounds: none.\par Palpation: non-tender.\par Stability: no instability.\par Strength: 5/5 all motor groups.\par ROM: Flexion to 60 degrees, 30 degrees external rotation, 30 degrees abduction. Groin pain. \par Leg length: equal. [de-identified] : Imaging done today, AP pelvis and lateral of both hips shows maintained superiolateral joint space with loss of the medial joint space of the right hip. 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

## 2022-04-25 NOTE — HISTORY OF PRESENT ILLNESS
[de-identified] : 81 year old female s/p left total hip replacement presents to the office today for a follow up. Patient reports doing well with her left total hip replacement. Today she complains of right groin pain and right low back pain. She states the pain start in her lower back, goes in to the right groin, down the right thigh and into the right knee. Patient states her groin pain is constant. She is here today to discuss her next options for pain relief.

## 2022-05-09 ENCOUNTER — APPOINTMENT (OUTPATIENT)
Dept: ORTHOPEDIC SURGERY | Facility: CLINIC | Age: 82
End: 2022-05-09

## 2022-05-09 ENCOUNTER — RESULT REVIEW (OUTPATIENT)
Age: 82
End: 2022-05-09

## 2022-05-09 ENCOUNTER — OUTPATIENT (OUTPATIENT)
Dept: OUTPATIENT SERVICES | Facility: HOSPITAL | Age: 82
LOS: 1 days | Discharge: HOME | End: 2022-05-09
Payer: COMMERCIAL

## 2022-05-09 DIAGNOSIS — Z90.49 ACQUIRED ABSENCE OF OTHER SPECIFIED PARTS OF DIGESTIVE TRACT: Chronic | ICD-10-CM

## 2022-05-09 DIAGNOSIS — Z98.890 OTHER SPECIFIED POSTPROCEDURAL STATES: Chronic | ICD-10-CM

## 2022-05-09 DIAGNOSIS — Z96.641 PRESENCE OF RIGHT ARTIFICIAL HIP JOINT: Chronic | ICD-10-CM

## 2022-05-09 DIAGNOSIS — M16.11 UNILATERAL PRIMARY OSTEOARTHRITIS, RIGHT HIP: ICD-10-CM

## 2022-05-09 DIAGNOSIS — I25.810 ATHEROSCLEROSIS OF CORONARY ARTERY BYPASS GRAFT(S) WITHOUT ANGINA PECTORIS: Chronic | ICD-10-CM

## 2022-05-09 DIAGNOSIS — M25.551 PAIN IN RIGHT HIP: ICD-10-CM

## 2022-05-09 DIAGNOSIS — E89.0 POSTPROCEDURAL HYPOTHYROIDISM: Chronic | ICD-10-CM

## 2022-05-09 PROCEDURE — 27093 INJECTION FOR HIP X-RAY: CPT | Mod: RT

## 2022-05-09 PROCEDURE — 73525 CONTRAST X-RAY OF HIP: CPT | Mod: 26,RT

## 2022-05-09 PROCEDURE — 99213 OFFICE O/P EST LOW 20 MIN: CPT

## 2022-05-09 NOTE — ASSESSMENT
[FreeTextEntry1] : \par 9/27/2021 -  81 year old female s/p successful left total hip replacement presents with complaints of intermittent groin pain of the right hip. She reports being able to ambulate well most days. Occasional difficulty on some days. I therefore feel we should manage her conservatively for now. She will use a cane to ambulate and take Tylenol PRN. F/u in 6 months. \par \par 4/25/2022 - 81 year old female doing well s/p left total hip replacement. Her chief complaint today is pain lateral to the right lower lumbar area with radiation to the lateral hip, into the groin, and down the lateral thigh to the knee. 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 Her radiographs also show changes in both the lower lumbar spine and the right hip. To help differentiae the two we will send her for a diagnostic right hip injection with Marcaine and Kenalog. She will keep a diary in the first 5 hours of what pain goes away and what pain remains.\par \par 5/9/2022- Pt presents to the office for follow up after having a intraarticular steroid injection into the right hip this afternoon. Her groin pain is gone, but her right lower lumbar pain, right lateral hip, right thigh and right lower leg pain all persist. She now understands the groin pain was coming from the right hip joint. These other pains are not coming from the right hip joint. She can now make an informed decision as to whether she would like to have an elective total hip replacement. She can think this over and get back to us.

## 2022-05-09 NOTE — HISTORY OF PRESENT ILLNESS
[de-identified] : 4/25/2022 - 81 year old female s/p left total hip replacement presents to the office today for a follow up. Patient reports doing well with her left total hip replacement. Today she complains of right groin pain and right low back pain. She states the pain start in her lower back, goes in to the right groin, down the right thigh and into the right knee. Patient states her groin pain is constant. She is here today to discuss her next options for pain relief. \par \par 5/9/2022 -

## 2022-06-07 ENCOUNTER — APPOINTMENT (OUTPATIENT)
Dept: CARDIOLOGY | Facility: CLINIC | Age: 82
End: 2022-06-07
Payer: MEDICARE

## 2022-06-07 VITALS
SYSTOLIC BLOOD PRESSURE: 130 MMHG | WEIGHT: 176 LBS | BODY MASS INDEX: 33.26 KG/M2 | DIASTOLIC BLOOD PRESSURE: 80 MMHG | HEART RATE: 56 BPM

## 2022-06-07 DIAGNOSIS — E78.2 MIXED HYPERLIPIDEMIA: ICD-10-CM

## 2022-06-07 PROCEDURE — 93000 ELECTROCARDIOGRAM COMPLETE: CPT

## 2022-06-07 PROCEDURE — 99214 OFFICE O/P EST MOD 30 MIN: CPT

## 2022-06-07 PROCEDURE — 99072 ADDL SUPL MATRL&STAF TM PHE: CPT

## 2022-06-07 RX ORDER — FLUTICASONE PROPIONATE 50 UG/1
50 SPRAY, METERED NASAL
Qty: 16 | Refills: 0 | Status: ACTIVE | COMMUNITY
Start: 2021-11-15

## 2022-06-07 RX ORDER — BRIMONIDINE TARTRATE 2 MG/MG
0.2 SOLUTION/ DROPS OPHTHALMIC
Refills: 0 | Status: ACTIVE | COMMUNITY

## 2022-06-07 NOTE — ASSESSMENT
[FreeTextEntry1] : 83 yo F with CAD s/p CABG / AVR.\par \par - Echo to evaluate AVR\par - Continue Aspirin, Rosuvastatin, Zetia\par - Continue Metoprolol and Valsartan-HCTZ\par - Antibiotic prophylaxis is required prior to procedures/surgery\par \par Echo and follow up visit same day in November

## 2022-06-07 NOTE — PHYSICAL EXAM
[Well Developed] : well developed [No Acute Distress] : no acute distress [Normal Conjunctiva] : normal conjunctiva [No Carotid Bruit] : no carotid bruit [Normal S1, S2] : normal S1, S2 [No Murmur] : no murmur [No Rub] : no rub [Clear Lung Fields] : clear lung fields [Good Air Entry] : good air entry [No Respiratory Distress] : no respiratory distress  [Soft] : abdomen soft [Non Tender] : non-tender [No Masses/organomegaly] : no masses/organomegaly [Normal Gait] : normal gait [No Edema] : no edema [No Cyanosis] : no cyanosis [No Clubbing] : no clubbing [No Rash] : no rash [No Skin Lesions] : no skin lesions [Moves all extremities] : moves all extremities [No Focal Deficits] : no focal deficits [Alert and Oriented] : alert and oriented

## 2022-06-07 NOTE — HISTORY OF PRESENT ILLNESS
[FreeTextEntry1] : Former patient of Dr. Fox.\par \par 81 yo F with a history of CAD s/p CABG / AVR, HLD presents for follow up.  Feels well.  No angina.  Back pain radiating to right knee.\par \par \par EKG (6/7/2022):  NSR, low-voltage, no ST-T changes\par NST (7/2019):  No ischemia\par

## 2022-10-24 NOTE — H&P PST ADULT - NS PRO REGISTERED ORGAN DONOR
Vicente Guzmán MD   [facility]  [address] [place] [address]     PATIENT NAME: Anabelle Gaspar  : 1957  DATE: 10/24/22  MRN: [id number]      Billing Provider: Vicente Guzmán MD  Level of Service:   Patient PCP Information       Provider PCP Type    Vicente Guzmán MD General            Reason for Visit / Chief Complaint: Sinusitis (Sinus symptoms x 3 weeks. Hoarseness, coughing, nasal drainage, headache. )         History of Present Illness / Problem Focused Workflow     Anabelle Gaspar presents to the clinic with Sinusitis (Sinus symptoms x 3 weeks. Hoarseness, coughing, nasal drainage, headache. )     HPI    Review of Systems     Review of Systems   Constitutional:  Negative for activity change, appetite change, fatigue and fever.   HENT:  Positive for nasal congestion, postnasal drip and rhinorrhea. Negative for ear pain, hearing loss, sinus pressure/congestion and sore throat.    Respiratory:  Positive for cough. Negative for chest tightness and shortness of breath.    Cardiovascular:  Negative for chest pain and palpitations.   Gastrointestinal:  Negative for abdominal pain and fecal incontinence.   Genitourinary:  Negative for bladder incontinence and difficulty urinating.   Musculoskeletal:  Negative for arthralgias.   Integumentary:  Negative for rash.   Neurological:  Negative for dizziness and headaches.      Medical / Social / Family History     Past Medical History:   Diagnosis Date    Cancer of female organs     Uterine cancer        Past Surgical History:   Procedure Laterality Date    CARDIAC CATHETERIZATION      3 stents    COLON SURGERY      CORONARY ARTERY BYPASS GRAFT      Triple bypass surgery.     HYSTERECTOMY      NECK SURGERY      SINUS SURGERY         Social History  Anabelle Gaspar  reports that she has been smoking. She has never used smokeless tobacco. She reports that she does not currently use alcohol.    Family History  Anabelle TINAJERO  Gaspar  family history includes Cancer in her sister; Diabetes in her mother and sister; Heart disease in her mother; Hyperlipidemia in her mother and sister; Hypertension in her mother and sister.    Medications and Allergies     Medications  Outpatient Medications Marked as Taking for the 10/24/22 encounter (Office Visit) with Vicente Guzmán MD   Medication Sig Dispense Refill    amitriptyline (ELAVIL) 100 MG tablet Take 100 mg by mouth nightly.      aspirin (ECOTRIN) 81 MG EC tablet Take 81 mg by mouth once daily.      butalbitaL-acetaminophen  mg Tab TAKE 1 OR 2 TABLETS BY MOUTH EVERY 4 HOURS AS NEEDED FOR PAIN      CALCIUM CITRATE ORAL Take 1 tablet by mouth once daily.      clopidogreL (PLAVIX) 75 mg tablet Take 75 mg by mouth once daily.      diltiaZEM (CARDIZEM CD) 120 MG Cp24 TAKE 1 CAPSULE BY MOUTH EVERYDAY FOR BLOOD PRESSURE.      DULoxetine (CYMBALTA) 60 MG capsule Take 60 mg by mouth 2 (two) times daily.      ergocalciferol, vitamin D2, (VITAMIN D ORAL) Take 1 tablet by mouth once daily.      ferrous sulfate (FEOSOL) Tab tablet Take 1 tablet by mouth 2 (two) times daily.      furosemide (LASIX) 80 MG tablet TAKE 1 TABLET BY MOUTH 2 TIMES A DAY FOR FLUID AND BLOOD PRESSURE.      gabapentin (NEURONTIN) 300 MG capsule Take 300 mg by mouth 3 (three) times daily.      hydrALAZINE (APRESOLINE) 10 MG tablet Take 10 mg by mouth 3 (three) times daily.      HYDROcodone-acetaminophen (NORCO)  mg per tablet TAKE 1 TABLET BY MOUTH 4 TIMES A DAY IF NEEDED FOR PAIN.      isosorbide mononitrate (IMDUR) 60 MG 24 hr tablet Take 60 mg by mouth 2 (two) times daily.      KRILL OIL ORAL Take 1 tablet by mouth once daily.      levothyroxine (SYNTHROID) 50 MCG tablet Take 1 tablet (50 mcg total) by mouth before breakfast. 90 tablet 0    methocarbamoL (ROBAXIN) 750 MG Tab Take 750 mg by mouth 3 (three) times daily as needed.      metOLazone (ZAROXOLYN) 2.5 MG tablet Take 2.5 mg by mouth daily  as needed. Take as needed for a weight gain of 3+      multivitamin (THERAGRAN) per tablet Take 1 tablet by mouth once daily.      nitroGLYCERIN (NITROSTAT) 0.4 MG SL tablet Place 0.4 mg under the tongue every 5 (five) minutes as needed for Chest pain.      paroxetine (PAXIL) 40 MG tablet TAKE 1 TABLET ONE TIME DAILY 90 tablet 0    potassium chloride SA (K-DUR,KLOR-CON) 20 MEQ tablet Take 1 tablet by mouth 2 (two) times a day.      promethazine (PHENERGAN) 25 MG tablet TAKE 1 TABLET BY MOUTH EVERYDAY IF NEEDED      topiramate (TOPAMAX) 200 MG Tab Take 200 mg by mouth nightly.      zolpidem (AMBIEN) 10 mg Tab TAKE 1 TABLET EVERY NIGHT AS NEEDED 90 tablet 1       Allergies  Review of patient's allergies indicates:   Allergen Reactions    Levaquin [levofloxacin] Swelling    Dexamethasone Edema and Other (See Comments)    Prednisone Other (See Comments)       Physical Examination     Vitals:    10/24/22 1100   BP: (!) 102/58   Pulse: 89   Temp: 98.8 °F (37.1 °C)     Physical Exam  Constitutional:       Appearance: Normal appearance.   HENT:      Head: Normocephalic and atraumatic.      Right Ear: Tympanic membrane normal.      Left Ear: Tympanic membrane normal.      Nose: Congestion and rhinorrhea present.      Mouth/Throat:      Pharynx: Posterior oropharyngeal erythema present.   Eyes:      Pupils: Pupils are equal, round, and reactive to light.   Cardiovascular:      Rate and Rhythm: Normal rate and regular rhythm.      Pulses: Normal pulses.      Heart sounds: Normal heart sounds.   Pulmonary:      Breath sounds: No wheezing or rhonchi.   Abdominal:      Palpations: Abdomen is soft.   Lymphadenopathy:      Cervical: Cervical adenopathy present.   Skin:     General: Skin is warm and dry.   Neurological:      Mental Status: She is alert.        Assessment and Plan (including Health Maintenance)   :    Plan:         Health Maintenance Due   Topic Date Due    Hepatitis C Screening  Never done     Pneumococcal Vaccines (Age 65+) (1 - PCV) Never done    HIV Screening  Never done    TETANUS VACCINE  Never done    DEXA Scan  Never done    Shingles Vaccine (1 of 2) Never done    Pap Smear  01/29/2017    COVID-19 Vaccine (2 - Moderna series) 09/14/2021    Influenza Vaccine (1) Never done       Problem List Items Addressed This Visit    None    There are no diagnoses linked to this encounter.   Health Maintenance Topics with due status: Not Due       Topic Last Completion Date    Colorectal Cancer Screening 07/03/2019    Lipid Panel 10/26/2021    Mammogram 07/28/2022       Procedures     Future Appointments   Date Time Provider Department Center   9/28/2023  2:00 PM [facility] M[facility]        No follow-ups on file.       Signature:  Vicente Guzmán MD  [facility]  [address] [facility] [place] [address]  [phone] Phone  [phone] Fax    Date of encounter: 10/24/22     No

## 2022-11-09 ENCOUNTER — APPOINTMENT (OUTPATIENT)
Dept: CARDIOLOGY | Facility: CLINIC | Age: 82
End: 2022-11-09

## 2022-11-09 VITALS
HEART RATE: 56 BPM | HEIGHT: 61 IN | WEIGHT: 174 LBS | SYSTOLIC BLOOD PRESSURE: 100 MMHG | BODY MASS INDEX: 32.85 KG/M2 | DIASTOLIC BLOOD PRESSURE: 60 MMHG

## 2022-11-09 DIAGNOSIS — Z01.810 ENCOUNTER FOR PREPROCEDURAL CARDIOVASCULAR EXAMINATION: ICD-10-CM

## 2022-11-09 PROCEDURE — 93000 ELECTROCARDIOGRAM COMPLETE: CPT

## 2022-11-09 PROCEDURE — 99214 OFFICE O/P EST MOD 30 MIN: CPT

## 2022-11-09 PROCEDURE — 93306 TTE W/DOPPLER COMPLETE: CPT

## 2022-11-09 PROCEDURE — 99072 ADDL SUPL MATRL&STAF TM PHE: CPT

## 2022-11-09 NOTE — HISTORY OF PRESENT ILLNESS
[FreeTextEntry1] : Former patient of Dr. Fox.\par \par 81 yo F with a history of CAD s/p 3v CABG / AVR, HTN, HLD presents for follow up and risk stratification prior to right THR.  Feels well.  Main complaint is back pain radiating to right knee.  Denies any chest pain, dyspnea, palpitations, dizziness, presyncope or syncope.  Echo today reviewed.\par \par \par EKG (11/9/2022):  SB, no ST-T changes\par Adenosine NST (7/2019):  No ischemia\par

## 2022-11-09 NOTE — ASSESSMENT
[FreeTextEntry1] : 83 yo F with CAD s/p 3v CABG / AVR.  Planned right THR.\par \par \par * Patient-based characteristics (Functional capacity)\par Patient is able to achieve more than 4 MET (walk 4 blocks, climb 2 flights of stairs, etc...)          Y [] / N [X]\par \par High-risk patient features:\par - Recent (<30 days) or active MI          Y [] / N [X]\par - Unstable or severe angina          Y [] / N [X]\par - Decompensated heart failure, or worsening or new-onset heart failure          Y [] / N [X]\par - Severe valvular disease          Y [] / N [X]\par - Significant arrhythmia (Tachy- or Bradyarrhythmia)          Y [] / N [X]\par \par * Surgery/Procedure-based characteristics (Type of surgery)\par - Elevated or Moderate-risk procedure (Inpatient)\par \par * Revised Cardiac Risk Index (RCRI)\par 1- History of ischemic heart disease          Y [X] / N []\par 2- History of congestive heart failure          Y [] / N [X]\par 3- History of stroke/TIA          Y [] / N [X]\par 4- History of insulin-dependent diabetes          Y [] / N [X]\par 5- Chronic kidney disease (Cr >2mg/dL)          Y [] / N [X]\par 6- Undergoing suprainguinal vascular, intraperitoneal, or intrathoracic surgery          Y [] / N [X]\par \par Class II risk (One factor) --> 6% risk (30-day risk of death, MI, or cardiac arrest)\par Moderate-risk for intermediate-risk surgery\par Continue aspirin, Rosuvastatin, Zetia\par Continue Metoprolol and Valsartan-HCTZ\par Antibiotic prophylaxis is required prior to procedures/surgery\par \par No further cardiac workup is indicated at this time.  There are no current cardiac contraindications that prohibit proceeding with the scheduled surgery/procedure.  This consult serves only as a perioperative cardiac risk stratification and evaluation to predict 30-day cardiac complications risk and mortality.  The decision to proceed with the surgery/procedure is made by the performing physician and the patient.

## 2022-11-21 ENCOUNTER — APPOINTMENT (OUTPATIENT)
Dept: ORTHOPEDIC SURGERY | Facility: CLINIC | Age: 82
End: 2022-11-21

## 2023-01-04 NOTE — ED PROVIDER NOTE - PROGRESS NOTE DETAILS
ATTENDING NOTE:   79 y/o F with PMH of CAD s/p CABGx3, aortic valve replacement, hypothyroidism, presents to ED for evaluation of right sided back pain x1 week. [facility] she felt sharp pain to right lower back, now radiating down her right leg. Denies fall or trauma. States pain is worse with movement and ambulation. No improvement of SX so came to ED for evaluation. No fever/chills, CP, SOB, abdominal pain, nausea, vomiting, LE numbness/weakness/tingling. No other complaints. Ambulatory but with pain.    On exam: Pt is well-appearing, NAD, WDWN, patient sitting up in bed, providing appropriate history. Ambulatory. NCAT. PERRLA 3mm b/l, no nystagmus, EOMI. HEENT normal, no pooling of secretions. +Full passive ROM in neck. S1S2, no MRG. CTABL, no WRC. Abdomen soft, NT/ND, no rebound or guarding, no CVAT. (+)Spinal TTP to L5 region, (+)TTP to right SI joint. No leg edema, +symmetric pulses b/l. CN2-12 grossly intact. No rash.  Given Motrin, will get imaging and reassess.
54

## 2023-02-08 ENCOUNTER — APPOINTMENT (OUTPATIENT)
Dept: ORTHOPEDIC SURGERY | Facility: CLINIC | Age: 83
End: 2023-02-08

## 2023-03-16 ENCOUNTER — APPOINTMENT (OUTPATIENT)
Dept: ORTHOPEDIC SURGERY | Facility: CLINIC | Age: 83
End: 2023-03-16
Payer: COMMERCIAL

## 2023-03-16 PROCEDURE — 73502 X-RAY EXAM HIP UNI 2-3 VIEWS: CPT

## 2023-03-16 PROCEDURE — 99072 ADDL SUPL MATRL&STAF TM PHE: CPT

## 2023-03-16 PROCEDURE — 99203 OFFICE O/P NEW LOW 30 MIN: CPT

## 2023-03-16 NOTE — HISTORY OF PRESENT ILLNESS
[de-identified] : 82-year-old lady, she had a left hip replacement done previously with Dr. Trejo.  She has increasing pain in the right hip which is also has osteoarthritis, she wants to move forward now with a right hip replacement.  She also has some low back pain and some lateral thigh pain.  She has not improved with medications therapy and she had several injections to the hip in the past year.\par \par Her health is otherwise good.\par \par We discussed the surgery, she wants to proceed with the replacement.  We will make the necessary arrangements.\par \par HISTORY\par Hip pain.\par Mechanical pain made worse with activity, not completely improved with rest, interfering with ADLs.\par At this point nonoperative management of the symptoms is ineffective and the patient has interest in moving forward with a joint replacement.\par \par Past medical history is on the chart for review and as mentioned above.\par \par ROS is negative for fever, chills, CP, SOB, unexplained weight loss or gain.\par \par \par \par EXAM\par Hip is painful with PROM and limited in flexion and internal rotation.\par There is significant guarding throughout the exam limiting ROM testing but no obvious contractures.\par NVID\par Decreased hip flexion strength compared to contralateral side.\par \par \par Imaging:\par X-rays AP Pelvis and Frog Lateral of the hips shows advanced degenerative changes including loss of the joint space with bone to bone apposition, osteophytes, subchondral sclerosis and cystic changes.\par \par \par Assessment: End stage arthritic hip.\par \par \par Plan is for a total hip replacement.\par The surgery, preoperative workup, surgical procedure, hospital course, post operative course, rehab, and expected outcomes were discussed.\par A description of the surgery in layman's terms, using models equivalent to the implants used during surgery, was a part of this conversation.\par The risks and benefits of the surgery were discussed.  Benefits  were described as improvement in pain and function.  Risks including bleeding, infection, blood clots, DVT, PE, stroke, heart attack, fracture, dislocation, leg length discrepancy, nerve palsy, neurovascular injury, persistent pain, RSD, and in rare cases death.\par Should the implant not function as expected or wear out then revision surgery may be required in the future. \par \par We will proceed with scheduling the procedure.\par \par

## 2023-03-23 ENCOUNTER — TRANSCRIPTION ENCOUNTER (OUTPATIENT)
Age: 83
End: 2023-03-23

## 2023-04-26 ENCOUNTER — APPOINTMENT (OUTPATIENT)
Dept: PULMONOLOGY | Facility: CLINIC | Age: 83
End: 2023-04-26
Payer: COMMERCIAL

## 2023-04-26 VITALS
OXYGEN SATURATION: 98 % | HEART RATE: 68 BPM | DIASTOLIC BLOOD PRESSURE: 70 MMHG | BODY MASS INDEX: 32.1 KG/M2 | SYSTOLIC BLOOD PRESSURE: 110 MMHG | HEIGHT: 61 IN | WEIGHT: 170 LBS

## 2023-04-26 DIAGNOSIS — Z01.818 ENCOUNTER FOR OTHER PREPROCEDURAL EXAMINATION: ICD-10-CM

## 2023-04-26 PROCEDURE — 99203 OFFICE O/P NEW LOW 30 MIN: CPT

## 2023-04-26 PROCEDURE — 99072 ADDL SUPL MATRL&STAF TM PHE: CPT

## 2023-04-26 NOTE — HISTORY OF PRESENT ILLNESS
[TextBox_4] : 83-year-old female with history of CAD status post CABG and aortic valve replacement, hypertension, hyperlipidemia, history of DEXTER not very compliant with CPAP, presenting for sleep apnea evaluation and preoperative clearance prior to hip replacement surgery.  She was told that she will be undergoing local regional anesthesia with a nerve block.  She does not know the severity of her DEXTER and her sleep study was done a long time ago.  She will need a new sleep study and she opted for home sleep study and prefers not to do an in lab sleep study.

## 2023-05-09 ENCOUNTER — RESULT REVIEW (OUTPATIENT)
Age: 83
End: 2023-05-09

## 2023-05-09 ENCOUNTER — OUTPATIENT (OUTPATIENT)
Dept: OUTPATIENT SERVICES | Facility: HOSPITAL | Age: 83
LOS: 1 days | End: 2023-05-09
Payer: MEDICARE

## 2023-05-09 VITALS
RESPIRATION RATE: 18 BRPM | SYSTOLIC BLOOD PRESSURE: 124 MMHG | TEMPERATURE: 96 F | HEIGHT: 60.5 IN | HEART RATE: 60 BPM | DIASTOLIC BLOOD PRESSURE: 60 MMHG | OXYGEN SATURATION: 98 % | WEIGHT: 171.08 LBS

## 2023-05-09 DIAGNOSIS — Z98.890 OTHER SPECIFIED POSTPROCEDURAL STATES: Chronic | ICD-10-CM

## 2023-05-09 DIAGNOSIS — Z90.49 ACQUIRED ABSENCE OF OTHER SPECIFIED PARTS OF DIGESTIVE TRACT: Chronic | ICD-10-CM

## 2023-05-09 DIAGNOSIS — M16.11 UNILATERAL PRIMARY OSTEOARTHRITIS, RIGHT HIP: ICD-10-CM

## 2023-05-09 DIAGNOSIS — Z01.818 ENCOUNTER FOR OTHER PREPROCEDURAL EXAMINATION: ICD-10-CM

## 2023-05-09 DIAGNOSIS — I25.810 ATHEROSCLEROSIS OF CORONARY ARTERY BYPASS GRAFT(S) WITHOUT ANGINA PECTORIS: Chronic | ICD-10-CM

## 2023-05-09 DIAGNOSIS — E89.0 POSTPROCEDURAL HYPOTHYROIDISM: Chronic | ICD-10-CM

## 2023-05-09 DIAGNOSIS — Z96.641 PRESENCE OF RIGHT ARTIFICIAL HIP JOINT: Chronic | ICD-10-CM

## 2023-05-09 LAB
A1C WITH ESTIMATED AVERAGE GLUCOSE RESULT: 6.2 % — HIGH (ref 4–5.6)
ALBUMIN SERPL ELPH-MCNC: 4.6 G/DL — SIGNIFICANT CHANGE UP (ref 3.5–5.2)
ALP SERPL-CCNC: 93 U/L — SIGNIFICANT CHANGE UP (ref 30–115)
ALT FLD-CCNC: 17 U/L — SIGNIFICANT CHANGE UP (ref 0–41)
ANION GAP SERPL CALC-SCNC: 15 MMOL/L — HIGH (ref 7–14)
APTT BLD: 34 SEC — SIGNIFICANT CHANGE UP (ref 27–39.2)
AST SERPL-CCNC: 20 U/L — SIGNIFICANT CHANGE UP (ref 0–41)
BASOPHILS # BLD AUTO: 0.06 K/UL — SIGNIFICANT CHANGE UP (ref 0–0.2)
BASOPHILS NFR BLD AUTO: 0.6 % — SIGNIFICANT CHANGE UP (ref 0–1)
BILIRUB SERPL-MCNC: 0.4 MG/DL — SIGNIFICANT CHANGE UP (ref 0.2–1.2)
BLD GP AB SCN SERPL QL: SIGNIFICANT CHANGE UP
BUN SERPL-MCNC: 40 MG/DL — HIGH (ref 10–20)
CALCIUM SERPL-MCNC: 9.4 MG/DL — SIGNIFICANT CHANGE UP (ref 8.4–10.5)
CHLORIDE SERPL-SCNC: 98 MMOL/L — SIGNIFICANT CHANGE UP (ref 98–110)
CO2 SERPL-SCNC: 25 MMOL/L — SIGNIFICANT CHANGE UP (ref 17–32)
CREAT SERPL-MCNC: 1.4 MG/DL — SIGNIFICANT CHANGE UP (ref 0.7–1.5)
EGFR: 37 ML/MIN/1.73M2 — LOW
EOSINOPHIL # BLD AUTO: 0.37 K/UL — SIGNIFICANT CHANGE UP (ref 0–0.7)
EOSINOPHIL NFR BLD AUTO: 3.9 % — SIGNIFICANT CHANGE UP (ref 0–8)
ESTIMATED AVERAGE GLUCOSE: 131 MG/DL — HIGH (ref 68–114)
GLUCOSE SERPL-MCNC: 90 MG/DL — SIGNIFICANT CHANGE UP (ref 70–99)
HCT VFR BLD CALC: 39.4 % — SIGNIFICANT CHANGE UP (ref 37–47)
HGB BLD-MCNC: 13.2 G/DL — SIGNIFICANT CHANGE UP (ref 12–16)
IMM GRANULOCYTES NFR BLD AUTO: 0.3 % — SIGNIFICANT CHANGE UP (ref 0.1–0.3)
INR BLD: 0.83 RATIO — SIGNIFICANT CHANGE UP (ref 0.65–1.3)
LYMPHOCYTES # BLD AUTO: 2.24 K/UL — SIGNIFICANT CHANGE UP (ref 1.2–3.4)
LYMPHOCYTES # BLD AUTO: 23.5 % — SIGNIFICANT CHANGE UP (ref 20.5–51.1)
MCHC RBC-ENTMCNC: 30.5 PG — SIGNIFICANT CHANGE UP (ref 27–31)
MCHC RBC-ENTMCNC: 33.5 G/DL — SIGNIFICANT CHANGE UP (ref 32–37)
MCV RBC AUTO: 91 FL — SIGNIFICANT CHANGE UP (ref 81–99)
MONOCYTES # BLD AUTO: 0.65 K/UL — HIGH (ref 0.1–0.6)
MONOCYTES NFR BLD AUTO: 6.8 % — SIGNIFICANT CHANGE UP (ref 1.7–9.3)
MRSA PCR RESULT.: NEGATIVE — SIGNIFICANT CHANGE UP
NEUTROPHILS # BLD AUTO: 6.2 K/UL — SIGNIFICANT CHANGE UP (ref 1.4–6.5)
NEUTROPHILS NFR BLD AUTO: 64.9 % — SIGNIFICANT CHANGE UP (ref 42.2–75.2)
NRBC # BLD: 0 /100 WBCS — SIGNIFICANT CHANGE UP (ref 0–0)
PLATELET # BLD AUTO: 264 K/UL — SIGNIFICANT CHANGE UP (ref 130–400)
PMV BLD: 10.4 FL — SIGNIFICANT CHANGE UP (ref 7.4–10.4)
POTASSIUM SERPL-MCNC: 4.5 MMOL/L — SIGNIFICANT CHANGE UP (ref 3.5–5)
POTASSIUM SERPL-SCNC: 4.5 MMOL/L — SIGNIFICANT CHANGE UP (ref 3.5–5)
PROT SERPL-MCNC: 7.3 G/DL — SIGNIFICANT CHANGE UP (ref 6–8)
PROTHROM AB SERPL-ACNC: 9.4 SEC — LOW (ref 9.95–12.87)
RBC # BLD: 4.33 M/UL — SIGNIFICANT CHANGE UP (ref 4.2–5.4)
RBC # FLD: 13 % — SIGNIFICANT CHANGE UP (ref 11.5–14.5)
SODIUM SERPL-SCNC: 138 MMOL/L — SIGNIFICANT CHANGE UP (ref 135–146)
WBC # BLD: 9.55 K/UL — SIGNIFICANT CHANGE UP (ref 4.8–10.8)
WBC # FLD AUTO: 9.55 K/UL — SIGNIFICANT CHANGE UP (ref 4.8–10.8)

## 2023-05-09 PROCEDURE — 86850 RBC ANTIBODY SCREEN: CPT

## 2023-05-09 PROCEDURE — 80053 COMPREHEN METABOLIC PANEL: CPT

## 2023-05-09 PROCEDURE — 87640 STAPH A DNA AMP PROBE: CPT

## 2023-05-09 PROCEDURE — 85730 THROMBOPLASTIN TIME PARTIAL: CPT

## 2023-05-09 PROCEDURE — 36415 COLL VENOUS BLD VENIPUNCTURE: CPT

## 2023-05-09 PROCEDURE — 73502 X-RAY EXAM HIP UNI 2-3 VIEWS: CPT | Mod: 26,RT

## 2023-05-09 PROCEDURE — 93010 ELECTROCARDIOGRAM REPORT: CPT

## 2023-05-09 PROCEDURE — 85025 COMPLETE CBC W/AUTO DIFF WBC: CPT

## 2023-05-09 PROCEDURE — 71046 X-RAY EXAM CHEST 2 VIEWS: CPT

## 2023-05-09 PROCEDURE — 83036 HEMOGLOBIN GLYCOSYLATED A1C: CPT

## 2023-05-09 PROCEDURE — 85610 PROTHROMBIN TIME: CPT

## 2023-05-09 PROCEDURE — 73502 X-RAY EXAM HIP UNI 2-3 VIEWS: CPT | Mod: RT

## 2023-05-09 PROCEDURE — 87641 MR-STAPH DNA AMP PROBE: CPT

## 2023-05-09 PROCEDURE — 86900 BLOOD TYPING SEROLOGIC ABO: CPT

## 2023-05-09 PROCEDURE — 93005 ELECTROCARDIOGRAM TRACING: CPT

## 2023-05-09 PROCEDURE — 99214 OFFICE O/P EST MOD 30 MIN: CPT | Mod: 25

## 2023-05-09 PROCEDURE — 71046 X-RAY EXAM CHEST 2 VIEWS: CPT | Mod: 26

## 2023-05-09 PROCEDURE — 86901 BLOOD TYPING SEROLOGIC RH(D): CPT

## 2023-05-09 RX ORDER — LEVOTHYROXINE SODIUM 125 MCG
1 TABLET ORAL
Qty: 0 | Refills: 0 | DISCHARGE

## 2023-05-09 RX ORDER — VALSARTAN 80 MG/1
1 TABLET ORAL
Qty: 0 | Refills: 0 | DISCHARGE

## 2023-05-09 NOTE — H&P PST ADULT - HISTORY OF PRESENT ILLNESS
82 Y/O FEMALE PRESENTS TO PAST WITH HX OA. PT C/O RIGHT HIP PAIN , SHARP, PROGRESSIVELY WORSE OVER PAST YEAR              PT NOW FOR SCHEDULED PROCEDURE ( RIGHT THR) . PT DENIES ANY CP SOB PALP COUGH DYSURIA FEVER URI. PT ABLE TO PACHECO 1FOS W/O SOB  pt denies any covid s/s, or tested positive in the past  pt advised self quarantine till day of procedure  Anesthesia Alert  NO--Difficult Airway  NO--History of neck surgery or radiation  NO--Limited ROM of neck  NO--History of Malignant hyperthermia  NO--Personal or family history of Pseudocholinesterase deficiency.  NO--Prior Anesthesia Complication  NO--Latex Allergy  NO--Loose teeth  NO--History of Rheumatoid Arthritis  NO--DEXTER  NO--Bleeding risk  NO--Other_____     84 Y/O FEMALE PRESENTS TO PAST WITH HX OA. PT C/O RIGHT HIP PAIN , SHARP, PROGRESSIVELY WORSE OVER PAST YEAR PT NOW FOR SCHEDULED PROCEDURE ( RIGHT THR) . PT DENIES ANY CP SOB PALP COUGH DYSURIA FEVER URI. PT ABLE TO PACHECO 1FOS W/O SOB  pt denies any covid s/s, or tested positive in the past  pt advised self quarantine till day of procedure  Anesthesia Alert  NO--Difficult Airway  NO--History of neck surgery or radiation  NO--Limited ROM of neck  NO--History of Malignant hyperthermia  NO--Personal or family history of Pseudocholinesterase deficiency.  NO--Prior Anesthesia Complication  NO--Latex Allergy  NO--Loose teeth  NO--History of Rheumatoid Arthritis  NO--DEXTER  NO--Bleeding risk  NO--Other_____     82 Y/O FEMALE PRESENTS TO PAST WITH HX OA. PT C/O RIGHT HIP PAIN , SHARP, PROGRESSIVELY WORSE OVER PAST YEAR PT NOW FOR SCHEDULED PROCEDURE ( RIGHT THR) . PT DENIES ANY CP SOB PALP COUGH DYSURIA FEVER URI. PT ABLE TO PACHECO 1 FOS W/O SOB, 1 1-2 FLAT BLOCKS, SLOWLY , LIMITED SECONDARY TO PAIN  pt denies any covid s/s, or tested positive in the past  pt advised self quarantine till day of procedure  Anesthesia Alert  CLASS IV  + DEXTER + CPAP   NO--History of neck surgery or radiation  NO--Limited ROM of neck  NO--History of Malignant hyperthermia  NO--Personal or family history of Pseudocholinesterase deficiency.  NO--Prior Anesthesia Complication  NO--Latex Allergy  NO--Loose teeth  NO--History of Rheumatoid Arthritis  NO--DEXTER  NO--Bleeding risk  NO--Other_____

## 2023-05-09 NOTE — H&P PST ADULT - NSICDXFAMILYHX_GEN_ALL_CORE_FT
FAMILY HISTORY:  Father  Still living? No  CAD (coronary artery disease) of bypass graft, Age at diagnosis: Age Unknown

## 2023-05-09 NOTE — H&P PST ADULT - NSICDXPASTMEDICALHX_GEN_ALL_CORE_FT
PAST MEDICAL HISTORY:  Asthma cant remember last flare up    CAD (coronary artery disease)     CAD (coronary artery disease) of bypass graft + AVR pig    Glaucoma     HTN (hypertension)     Hypothyroid     Macular degeneration     Obstructive sleep apnea on CPAP      PAST MEDICAL HISTORY:  Asthma cant remember last flare up    CAD (coronary artery disease)     CAD (coronary artery disease) of bypass graft + AVR pig    Glaucoma     HTN (hypertension)     Hypothyroid     Macular degeneration     Obstructive sleep apnea on CPAP     DEXTER on CPAP

## 2023-05-09 NOTE — H&P PST ADULT - NSICDXPASTSURGICALHX_GEN_ALL_CORE_FT
PAST SURGICAL HISTORY:  CAD (coronary artery disease) of bypass graft 3v avr    History of hip replacement, total, right     History of surgery Right CEA 9-18  per pt complication post op bleeding    S/P cholecystectomy     S/P partial thyroidectomy

## 2023-05-10 ENCOUNTER — APPOINTMENT (OUTPATIENT)
Dept: CARDIOLOGY | Facility: CLINIC | Age: 83
End: 2023-05-10
Payer: COMMERCIAL

## 2023-05-10 VITALS
WEIGHT: 172 LBS | DIASTOLIC BLOOD PRESSURE: 70 MMHG | BODY MASS INDEX: 32.5 KG/M2 | SYSTOLIC BLOOD PRESSURE: 116 MMHG | HEART RATE: 66 BPM

## 2023-05-10 DIAGNOSIS — M16.11 UNILATERAL PRIMARY OSTEOARTHRITIS, RIGHT HIP: ICD-10-CM

## 2023-05-10 DIAGNOSIS — Z01.818 ENCOUNTER FOR OTHER PREPROCEDURAL EXAMINATION: ICD-10-CM

## 2023-05-10 DIAGNOSIS — Z01.810 ENCOUNTER FOR PREPROCEDURAL CARDIOVASCULAR EXAMINATION: ICD-10-CM

## 2023-05-10 PROBLEM — G47.33 OBSTRUCTIVE SLEEP APNEA (ADULT) (PEDIATRIC): Chronic | Status: ACTIVE | Noted: 2023-05-09

## 2023-05-10 PROCEDURE — 99072 ADDL SUPL MATRL&STAF TM PHE: CPT

## 2023-05-10 PROCEDURE — 99214 OFFICE O/P EST MOD 30 MIN: CPT

## 2023-05-10 PROCEDURE — 93000 ELECTROCARDIOGRAM COMPLETE: CPT

## 2023-05-10 RX ORDER — MELOXICAM 15 MG/1
15 TABLET ORAL
Qty: 30 | Refills: 0 | Status: DISCONTINUED | COMMUNITY
End: 2023-05-10

## 2023-05-10 RX ORDER — LEVOTHYROXINE SODIUM 0.12 MG/1
125 TABLET ORAL DAILY
Qty: 90 | Refills: 1 | Status: DISCONTINUED | COMMUNITY
End: 2023-05-10

## 2023-05-10 RX ORDER — LEVOTHYROXINE SODIUM 0.1 MG/1
100 TABLET ORAL DAILY
Refills: 0 | Status: ACTIVE | COMMUNITY

## 2023-05-10 RX ORDER — ROSUVASTATIN CALCIUM 20 MG/1
20 TABLET, FILM COATED ORAL DAILY
Qty: 90 | Refills: 3 | Status: DISCONTINUED | COMMUNITY
End: 2023-05-10

## 2023-05-10 NOTE — HISTORY OF PRESENT ILLNESS
[FreeTextEntry1] : Former patient of Dr. Fox.\par \par 83 F with a history of CAD s/p 3v CABG / AVR, HTN, HLD.  Planned right THR on 5/26/2023.\par \par Denies any CP, SOB, palpitations, orthopnea/PND, dizziness or syncope\par Main complaint is back pain radiating to right knee\par Stopped taking Rosuvastatin 1 month ago due to myalgias\par Statin intolerant - tried 4 others in the past\par \par \par Adenosine NST (7/2019):  No ischemia\par

## 2023-05-10 NOTE — ASSESSMENT
[FreeTextEntry1] : 81 yo F with CAD s/p 3v CABG / AVR.  Planned right THR on 5/26/2023.\par \par Continue aspirin, Rosuvastatin, Zetia\par Continue Metoprolol and Valsartan-HCTZ\par \par RCRI = 1, Class 2 risk, 6% risk (30-day risk of death, MI, or cardiac arrest)\par Moderate-risk for intermediate-risk surgery\par Antibiotic prophylaxis is required prior to procedures/surgery\par \par No further cardiac workup is indicated at this time.  There are no current cardiac contraindications that prohibit proceeding with the scheduled surgery/procedure.  This consult serves only as a perioperative cardiac risk stratification and evaluation to predict 30-day cardiac complications risk and mortality.  The decision to proceed with the surgery/procedure is made by the performing physician and the patient.

## 2023-05-25 RX ORDER — ATORVASTATIN CALCIUM 80 MG/1
80 TABLET, FILM COATED ORAL AT BEDTIME
Refills: 0 | Status: DISCONTINUED | OUTPATIENT
Start: 2023-05-26 | End: 2023-05-27

## 2023-05-25 RX ORDER — METOPROLOL TARTRATE 50 MG
25 TABLET ORAL DAILY
Refills: 0 | Status: DISCONTINUED | OUTPATIENT
Start: 2023-05-26 | End: 2023-05-27

## 2023-05-25 RX ORDER — VALSARTAN 80 MG/1
160 TABLET ORAL DAILY
Refills: 0 | Status: DISCONTINUED | OUTPATIENT
Start: 2023-05-26 | End: 2023-05-27

## 2023-05-25 RX ORDER — LEVOTHYROXINE SODIUM 125 MCG
100 TABLET ORAL DAILY
Refills: 0 | Status: DISCONTINUED | OUTPATIENT
Start: 2023-05-26 | End: 2023-05-27

## 2023-05-25 RX ORDER — BRIMONIDINE TARTRATE 2 MG/MG
1 SOLUTION/ DROPS OPHTHALMIC
Refills: 0 | Status: DISCONTINUED | OUTPATIENT
Start: 2023-05-26 | End: 2023-05-27

## 2023-05-25 RX ORDER — LATANOPROST 0.05 MG/ML
1 SOLUTION/ DROPS OPHTHALMIC; TOPICAL AT BEDTIME
Refills: 0 | Status: DISCONTINUED | OUTPATIENT
Start: 2023-05-26 | End: 2023-05-27

## 2023-05-26 ENCOUNTER — TRANSCRIPTION ENCOUNTER (OUTPATIENT)
Age: 83
End: 2023-05-26

## 2023-05-26 ENCOUNTER — APPOINTMENT (OUTPATIENT)
Dept: ORTHOPEDIC SURGERY | Facility: HOSPITAL | Age: 83
End: 2023-05-26

## 2023-05-26 ENCOUNTER — RESULT REVIEW (OUTPATIENT)
Age: 83
End: 2023-05-26

## 2023-05-26 ENCOUNTER — INPATIENT (INPATIENT)
Facility: HOSPITAL | Age: 83
LOS: 0 days | Discharge: HOME HEALTH | DRG: 470 | End: 2023-05-27
Attending: ORTHOPAEDIC SURGERY | Admitting: ORTHOPAEDIC SURGERY
Payer: MEDICARE

## 2023-05-26 VITALS
OXYGEN SATURATION: 97 % | SYSTOLIC BLOOD PRESSURE: 136 MMHG | HEART RATE: 59 BPM | HEIGHT: 61 IN | DIASTOLIC BLOOD PRESSURE: 63 MMHG | WEIGHT: 169.98 LBS | RESPIRATION RATE: 17 BRPM | TEMPERATURE: 97 F

## 2023-05-26 DIAGNOSIS — Z96.641 PRESENCE OF RIGHT ARTIFICIAL HIP JOINT: Chronic | ICD-10-CM

## 2023-05-26 DIAGNOSIS — Z90.49 ACQUIRED ABSENCE OF OTHER SPECIFIED PARTS OF DIGESTIVE TRACT: Chronic | ICD-10-CM

## 2023-05-26 DIAGNOSIS — M16.11 UNILATERAL PRIMARY OSTEOARTHRITIS, RIGHT HIP: ICD-10-CM

## 2023-05-26 DIAGNOSIS — I25.810 ATHEROSCLEROSIS OF CORONARY ARTERY BYPASS GRAFT(S) WITHOUT ANGINA PECTORIS: Chronic | ICD-10-CM

## 2023-05-26 DIAGNOSIS — E89.0 POSTPROCEDURAL HYPOTHYROIDISM: Chronic | ICD-10-CM

## 2023-05-26 DIAGNOSIS — Z98.890 OTHER SPECIFIED POSTPROCEDURAL STATES: Chronic | ICD-10-CM

## 2023-05-26 LAB — GLUCOSE BLDC GLUCOMTR-MCNC: 110 MG/DL — HIGH (ref 70–99)

## 2023-05-26 PROCEDURE — C1776: CPT

## 2023-05-26 PROCEDURE — 97535 SELF CARE MNGMENT TRAINING: CPT | Mod: GO

## 2023-05-26 PROCEDURE — 27130 TOTAL HIP ARTHROPLASTY: CPT | Mod: RT

## 2023-05-26 PROCEDURE — C1713: CPT

## 2023-05-26 PROCEDURE — 88305 TISSUE EXAM BY PATHOLOGIST: CPT | Mod: 26

## 2023-05-26 PROCEDURE — 97116 GAIT TRAINING THERAPY: CPT | Mod: GP

## 2023-05-26 PROCEDURE — 97165 OT EVAL LOW COMPLEX 30 MIN: CPT | Mod: GO

## 2023-05-26 PROCEDURE — 97110 THERAPEUTIC EXERCISES: CPT | Mod: GP

## 2023-05-26 PROCEDURE — 85027 COMPLETE CBC AUTOMATED: CPT

## 2023-05-26 PROCEDURE — 80048 BASIC METABOLIC PNL TOTAL CA: CPT

## 2023-05-26 PROCEDURE — 88311 DECALCIFY TISSUE: CPT | Mod: 26

## 2023-05-26 PROCEDURE — 97162 PT EVAL MOD COMPLEX 30 MIN: CPT | Mod: GP

## 2023-05-26 RX ORDER — ACETAMINOPHEN 500 MG
650 TABLET ORAL EVERY 6 HOURS
Refills: 0 | Status: DISCONTINUED | OUTPATIENT
Start: 2023-05-26 | End: 2023-05-27

## 2023-05-26 RX ORDER — SENNA PLUS 8.6 MG/1
2 TABLET ORAL
Qty: 14 | Refills: 0
Start: 2023-05-26 | End: 2023-06-01

## 2023-05-26 RX ORDER — SODIUM CHLORIDE 9 MG/ML
1000 INJECTION, SOLUTION INTRAVENOUS
Refills: 0 | Status: DISCONTINUED | OUTPATIENT
Start: 2023-05-26 | End: 2023-05-27

## 2023-05-26 RX ORDER — MAGNESIUM HYDROXIDE 400 MG/1
30 TABLET, CHEWABLE ORAL DAILY
Refills: 0 | Status: DISCONTINUED | OUTPATIENT
Start: 2023-05-26 | End: 2023-05-27

## 2023-05-26 RX ORDER — KETOROLAC TROMETHAMINE 30 MG/ML
15 SYRINGE (ML) INJECTION EVERY 6 HOURS
Refills: 0 | Status: DISCONTINUED | OUTPATIENT
Start: 2023-05-26 | End: 2023-05-27

## 2023-05-26 RX ORDER — ACETAMINOPHEN 500 MG
1000 TABLET ORAL ONCE
Refills: 0 | Status: COMPLETED | OUTPATIENT
Start: 2023-05-26 | End: 2023-05-26

## 2023-05-26 RX ORDER — SENNA PLUS 8.6 MG/1
2 TABLET ORAL AT BEDTIME
Refills: 0 | Status: DISCONTINUED | OUTPATIENT
Start: 2023-05-26 | End: 2023-05-27

## 2023-05-26 RX ORDER — HYDROMORPHONE HYDROCHLORIDE 2 MG/ML
0.5 INJECTION INTRAMUSCULAR; INTRAVENOUS; SUBCUTANEOUS
Refills: 0 | Status: DISCONTINUED | OUTPATIENT
Start: 2023-05-26 | End: 2023-05-27

## 2023-05-26 RX ORDER — ONDANSETRON 8 MG/1
4 TABLET, FILM COATED ORAL ONCE
Refills: 0 | Status: DISCONTINUED | OUTPATIENT
Start: 2023-05-26 | End: 2023-05-27

## 2023-05-26 RX ORDER — TIMOLOL 0.5 %
1 DROPS OPHTHALMIC (EYE)
Refills: 0 | Status: DISCONTINUED | OUTPATIENT
Start: 2023-05-26 | End: 2023-05-27

## 2023-05-26 RX ORDER — CHLORHEXIDINE GLUCONATE 213 G/1000ML
1 SOLUTION TOPICAL DAILY
Refills: 0 | Status: DISCONTINUED | OUTPATIENT
Start: 2023-05-26 | End: 2023-05-27

## 2023-05-26 RX ORDER — ACETAMINOPHEN 500 MG
2 TABLET ORAL
Qty: 112 | Refills: 0
Start: 2023-05-26 | End: 2023-06-08

## 2023-05-26 RX ORDER — ASPIRIN/CALCIUM CARB/MAGNESIUM 324 MG
81 TABLET ORAL EVERY 12 HOURS
Refills: 0 | Status: DISCONTINUED | OUTPATIENT
Start: 2023-05-26 | End: 2023-05-27

## 2023-05-26 RX ORDER — TRAMADOL HYDROCHLORIDE 50 MG/1
50 TABLET ORAL EVERY 4 HOURS
Refills: 0 | Status: DISCONTINUED | OUTPATIENT
Start: 2023-05-26 | End: 2023-05-27

## 2023-05-26 RX ORDER — DORZOLAMIDE HYDROCHLORIDE TIMOLOL MALEATE 20; 5 MG/ML; MG/ML
1 SOLUTION/ DROPS OPHTHALMIC
Refills: 0 | Status: DISCONTINUED | OUTPATIENT
Start: 2023-05-26 | End: 2023-05-26

## 2023-05-26 RX ORDER — ASPIRIN/CALCIUM CARB/MAGNESIUM 324 MG
1 TABLET ORAL
Qty: 60 | Refills: 0
Start: 2023-05-26 | End: 2023-06-24

## 2023-05-26 RX ORDER — TRAMADOL HYDROCHLORIDE 50 MG/1
1 TABLET ORAL
Qty: 20 | Refills: 0
Start: 2023-05-26 | End: 2023-05-30

## 2023-05-26 RX ORDER — PANTOPRAZOLE SODIUM 20 MG/1
40 TABLET, DELAYED RELEASE ORAL
Refills: 0 | Status: DISCONTINUED | OUTPATIENT
Start: 2023-05-26 | End: 2023-05-27

## 2023-05-26 RX ORDER — ROSUVASTATIN CALCIUM 5 MG/1
1 TABLET ORAL
Qty: 0 | Refills: 0 | DISCHARGE

## 2023-05-26 RX ORDER — ONDANSETRON 8 MG/1
4 TABLET, FILM COATED ORAL EVERY 6 HOURS
Refills: 0 | Status: DISCONTINUED | OUTPATIENT
Start: 2023-05-26 | End: 2023-05-27

## 2023-05-26 RX ORDER — DEXAMETHASONE 0.5 MG/5ML
2 ELIXIR ORAL ONCE
Refills: 0 | Status: DISCONTINUED | OUTPATIENT
Start: 2023-05-27 | End: 2023-05-27

## 2023-05-26 RX ORDER — DORZOLAMIDE HYDROCHLORIDE TIMOLOL MALEATE 20; 5 MG/ML; MG/ML
1 SOLUTION/ DROPS OPHTHALMIC
Refills: 0 | DISCHARGE

## 2023-05-26 RX ORDER — PANTOPRAZOLE SODIUM 20 MG/1
1 TABLET, DELAYED RELEASE ORAL
Qty: 30 | Refills: 0
Start: 2023-05-26 | End: 2023-06-24

## 2023-05-26 RX ORDER — DORZOLAMIDE HYDROCHLORIDE 20 MG/ML
1 SOLUTION/ DROPS OPHTHALMIC
Refills: 0 | Status: DISCONTINUED | OUTPATIENT
Start: 2023-05-26 | End: 2023-05-27

## 2023-05-26 RX ORDER — CEFAZOLIN SODIUM 1 G
2000 VIAL (EA) INJECTION EVERY 8 HOURS
Refills: 0 | Status: COMPLETED | OUTPATIENT
Start: 2023-05-26 | End: 2023-05-26

## 2023-05-26 RX ORDER — CEFAZOLIN SODIUM 1 G
2000 VIAL (EA) INJECTION EVERY 8 HOURS
Refills: 0 | Status: DISCONTINUED | OUTPATIENT
Start: 2023-05-26 | End: 2023-05-26

## 2023-05-26 RX ORDER — ASPIRIN/CALCIUM CARB/MAGNESIUM 324 MG
1 TABLET ORAL
Qty: 0 | Refills: 0 | DISCHARGE

## 2023-05-26 RX ORDER — POLYETHYLENE GLYCOL 3350 17 G/17G
17 POWDER, FOR SOLUTION ORAL AT BEDTIME
Refills: 0 | Status: DISCONTINUED | OUTPATIENT
Start: 2023-05-26 | End: 2023-05-27

## 2023-05-26 RX ADMIN — TRAMADOL HYDROCHLORIDE 50 MILLIGRAM(S): 50 TABLET ORAL at 22:02

## 2023-05-26 RX ADMIN — Medication 81 MILLIGRAM(S): at 17:51

## 2023-05-26 RX ADMIN — Medication 100 MILLIGRAM(S): at 22:04

## 2023-05-26 RX ADMIN — Medication 15 MILLIGRAM(S): at 22:01

## 2023-05-26 RX ADMIN — Medication 650 MILLIGRAM(S): at 22:02

## 2023-05-26 RX ADMIN — TRAMADOL HYDROCHLORIDE 50 MILLIGRAM(S): 50 TABLET ORAL at 22:30

## 2023-05-26 RX ADMIN — Medication 650 MILLIGRAM(S): at 22:32

## 2023-05-26 RX ADMIN — Medication 15 MILLIGRAM(S): at 22:16

## 2023-05-26 RX ADMIN — Medication 650 MILLIGRAM(S): at 19:34

## 2023-05-26 RX ADMIN — Medication 1000 MILLIGRAM(S): at 06:21

## 2023-05-26 RX ADMIN — Medication 1000 MILLIGRAM(S): at 06:53

## 2023-05-26 RX ADMIN — Medication 650 MILLIGRAM(S): at 17:51

## 2023-05-26 RX ADMIN — Medication 100 MILLIGRAM(S): at 16:53

## 2023-05-26 RX ADMIN — Medication 1 DROP(S): at 18:00

## 2023-05-26 RX ADMIN — ATORVASTATIN CALCIUM 80 MILLIGRAM(S): 80 TABLET, FILM COATED ORAL at 22:03

## 2023-05-26 RX ADMIN — SODIUM CHLORIDE 100 MILLILITER(S): 9 INJECTION, SOLUTION INTRAVENOUS at 10:33

## 2023-05-26 RX ADMIN — Medication 15 MILLIGRAM(S): at 17:52

## 2023-05-26 RX ADMIN — Medication 1 TABLET(S): at 13:00

## 2023-05-26 RX ADMIN — LATANOPROST 1 DROP(S): 0.05 SOLUTION/ DROPS OPHTHALMIC; TOPICAL at 22:03

## 2023-05-26 RX ADMIN — Medication 15 MILLIGRAM(S): at 19:34

## 2023-05-26 RX ADMIN — Medication 15 MILLIGRAM(S): at 13:01

## 2023-05-26 RX ADMIN — Medication 15 MILLIGRAM(S): at 16:02

## 2023-05-26 RX ADMIN — Medication 650 MILLIGRAM(S): at 13:00

## 2023-05-26 NOTE — PROVIDER CONTACT NOTE (OTHER) - ACTION/TREATMENT ORDERED:
Pt was assessed at bedside by BRIDGET Larry. No immediate intervention at this time ordered. Continue with ice application and pain management per protocol.

## 2023-05-26 NOTE — PHYSICAL THERAPY INITIAL EVALUATION ADULT - GENERAL OBSERVATIONS, REHAB EVAL
14:30 -15:00 Chart reviewed. Order received.  Patient is ok to be  seen for PT,confirmed with RN. pt encountered  Semi diamond in  the bed , C/o Rt hip  pain, and agrees to participate in session, +  Heplock , + Rt hip Aquacel / BLE Compression socks  ,NAD.

## 2023-05-26 NOTE — OCCUPATIONAL THERAPY INITIAL EVALUATION ADULT - REHAB POTENTIAL, OT EVAL
Pt and daughter demonstrated good understanding of adaptive equipment and home safety./good, to achieve stated therapy goals

## 2023-05-26 NOTE — BRIEF OPERATIVE NOTE - COMMENTS
depuy implants, 48 pinnacle, 6.5 mm bone screw x2, 32 neutral liner, 3 std actis stem, 32+5  delta ceramic femoral head

## 2023-05-26 NOTE — CHART NOTE - NSCHARTNOTEFT_GEN_A_CORE
PACU ANESTHESIA ADMISSION NOTE    Procedure: Total hip replacement  Post op diagnosis:  Osteoarthritis of right hip    _x___  Patent Airway    _x___  Full return of protective reflexes    _x___  Full recovery from anesthesia / back to baseline status    Vitals:  T(C): 97.7 F  HR: 57  BP: 119/59  RR: 14  SpO2: 96%    Mental Status:  _x___ Awake   __x___ Alert   _____ Drowsy   _____ Sedated    Nausea/Vomiting:  _x___ NO  ______Yes,   See Post - Op Orders          Pain Scale (0-10):  _____    Treatment: ____ None    __x__ See Post - Op/PCA Orders    Post - Operative Fluids:   ____ Oral   __x__ See Post - Op Orders    Plan: Discharge:   ____Home       _x____Floor     _____Critical Care    _____  Other:_________________    Comments: uneventful anesthesia course no complications. Vitals stable. Pt transferred to PACU. Transfer to floor when criteria is met

## 2023-05-26 NOTE — DISCHARGE NOTE PROVIDER - NSDCFUADDINST_GEN_ALL_CORE_FT
Please continue Physical therapy at home, weight bearing as tolerated with a walker for 4 weeks; posterior dislocation precautions.   Continue Aspirin 81mg twice a day x30 days to prevent blood clots.   Continue protonix 40mg once daily x30 days for GI prophylaxis.   Keep post-op dressing on for 1 week, showering with dressing on is allowed begining on post op day 2; dressing is water resistant. If dressing falls off before 7 days that is OK. Make sure to keep incison site dry after showering. Do not apply any creams or lotions to incision site.   If any purulent/excessive drainage please contact your surgeon.   Follow up with  on june 15th

## 2023-05-26 NOTE — PROVIDER CONTACT NOTE (OTHER) - SITUATION
Patient reported that she attempted to push herself up in bed using her left leg. While doing so, she reports she heard a "clunking" sound in her R hip, followed by intermittent muscle spasms.

## 2023-05-26 NOTE — PHYSICAL THERAPY INITIAL EVALUATION ADULT - SHORT TERM MEMORY, REHAB EVAL
Ok to continue SN visits every other week for community wellness, disease management education    Bettie Cabrera RN  [facility]   [phone]    [facility] utilizes an encounter to take the place of a direct phone call to your office. Please take a moment to review the below request. Your reply to this encounter will act as a verbal OK of orders if requested below. Thank you.       intact

## 2023-05-26 NOTE — DISCHARGE NOTE PROVIDER - NSDCCPTREATMENT_GEN_ALL_CORE_FT
PRINCIPAL PROCEDURE  Procedure: Total hip replacement  Findings and Treatment: right, direct anterior

## 2023-05-26 NOTE — DISCHARGE NOTE PROVIDER - NSDCCPGOAL_GEN_ALL_CORE_FT
To get better and follow your care plan as instructed. To get better and follow your care plan as instructed. PT, wbat with walker, cont aspirin 81 mg bid x 30 days for dvt ppx, cont protonix for GI ppx, keep postop dressing 1 week, may shower, f/u as OP with dr Le as OP in 3 weeks

## 2023-05-26 NOTE — DISCHARGE NOTE PROVIDER - NSDCCPCAREPLAN_GEN_ALL_CORE_FT
PRINCIPAL DISCHARGE DIAGNOSIS  Diagnosis: Status post right hip replacement  Assessment and Plan of Treatment:

## 2023-05-26 NOTE — OCCUPATIONAL THERAPY INITIAL EVALUATION ADULT - LIVES WITH, PROFILE
lives with daughter and son in law with no steps to enter and 12 steps inside to bedroom + walk-in tub + comfort seat toilet/children

## 2023-05-26 NOTE — DISCHARGE NOTE PROVIDER - NSDCFUSCHEDAPPT_GEN_ALL_CORE_FT
Srikanth Le  [facility] Physician [facility]  [address]  Scheduled Appointment: 06/15/2023    Roni Davenport  [facility] Physician [facility]  P[address]MMED [address]  Scheduled Appointment: 07/03/2023    Elvia Guerrero  [facility]  CARDIOLOGY [address]  Scheduled Appointment: 08/02/2023

## 2023-05-26 NOTE — DISCHARGE NOTE PROVIDER - NSDCMRMEDTOKEN_GEN_ALL_CORE_FT
acetaminophen 325 mg oral tablet: 2 tab(s) orally every 6 hours MDD: 8  Alphagan 0.2% ophthalmic solution: 1 drop(s) to each affected eye 2 times a day  aspirin 81 mg oral delayed release tablet: 1 tab(s) orally every 12 hours  dorzolamide-timolol 2%-0.5% ophthalmic solution: 1 in each eye 2 times a day  Levothroid 100 mcg (0.1 mg) oral tablet: 1 orally once a day  metoprolol succinate 25 mg oral tablet, extended release: 1 tab(s) orally once a day  pantoprazole 40 mg oral delayed release tablet: 1 tab(s) orally once a day (before a meal) MDD: 1  senna leaf extract oral tablet: 2 tab(s) orally once a day (at bedtime) MDD: 2  traMADol 50 mg oral tablet: 1 tab(s) orally every 6 hours as needed for Mild Pain (1 - 3) MDD: 4  valsartan-hydrochlorothiazide 160 mg-12.5 mg oral tablet: 1 orally once a day  Xalatan 0.005% ophthalmic solution: 1 drop(s) to each affected eye once a day (in the evening)   acetaminophen 325 mg oral tablet: 2 tab(s) orally every 6 hours MDD: 8  Alphagan 0.2% ophthalmic solution: 1 drop(s) to each affected eye 2 times a day  aspirin 81 mg oral delayed release tablet: 1 tab(s) orally every 12 hours  Levothroid 100 mcg (0.1 mg) oral tablet: 1 orally once a day  metoprolol succinate 25 mg oral tablet, extended release: 1 tab(s) orally once a day  pantoprazole 40 mg oral delayed release tablet: 1 tab(s) orally once a day (before a meal) MDD: 1  senna leaf extract oral tablet: 2 tab(s) orally once a day (at bedtime) MDD: 2  traMADol 50 mg oral tablet: 1 tab(s) orally every 6 hours as needed for Mild Pain (1 - 3) MDD: 4  valsartan-hydrochlorothiazide 160 mg-12.5 mg oral tablet: 1 orally once a day  Xalatan 0.005% ophthalmic solution: 1 drop(s) to each affected eye once a day (in the evening)

## 2023-05-26 NOTE — PATIENT PROFILE ADULT - FALL HARM RISK - HARM RISK INTERVENTIONS
Assistance with ambulation/Assistance OOB with selected safe patient handling equipment/Communicate Risk of Fall with Harm to all staff/Discuss with provider need for PT consult/Monitor gait and stability/Provide patient with walking aids - walker, cane, crutches/Reinforce activity limits and safety measures with patient and family/Sit up slowly, dangle for a short time, stand at bedside before walking/Tailored Fall Risk Interventions/Use of alarms - bed, chair and/or voice tab/Visual Cue: Yellow wristband and red socks/Bed in lowest position, wheels locked, appropriate side rails in place/Call bell, personal items and telephone in reach/Instruct patient to call for assistance before getting out of bed or chair/Non-slip footwear when patient is out of bed/[place] to call system/Physically safe environment - no spills, clutter or unnecessary equipment/Purposeful Proactive Rounding/Room/bathroom lighting operational, light cord in reach

## 2023-05-26 NOTE — PHYSICAL THERAPY INITIAL EVALUATION ADULT - ADDITIONAL COMMENTS
Pt  lives with daughter and son in law with no steps to enter and 12 steps inside to bedroom W/ LT HR   , was independent W/ Bed mob , transfer , ambulation And Stair Nego W/ LT HR and using Rollator and SC  PTA.  Need RW

## 2023-05-26 NOTE — ASU PATIENT PROFILE, ADULT - NSICDXPASTMEDICALHX_GEN_ALL_CORE_FT
PAST MEDICAL HISTORY:  Asthma cant remember last flare up    CAD (coronary artery disease)     CAD (coronary artery disease) of bypass graft + AVR pig    Glaucoma     HTN (hypertension)     Hypothyroid     Macular degeneration     Obesity (BMI 30-39.9)     Obstructive sleep apnea on CPAP     DEXTER on CPAP

## 2023-05-26 NOTE — PHYSICAL THERAPY INITIAL EVALUATION ADULT - RANGE OF MOTION EXAMINATION, REHAB EVAL
Rt hip 0-90 flexion , 0-30 degree abduction , Rt knee and ankle WFL , LLE WFL , Refer OT eval for BUE

## 2023-05-26 NOTE — PROVIDER CONTACT NOTE (OTHER) - ASSESSMENT
No redness or swelling of surgical site on exam. +B/L palpable pulses in lower extremities. Strong ankle dorsiflexion/plantarflexion. Pt w/ sensation in b/l legs into toes. Increased pain on ambulation.

## 2023-05-26 NOTE — DISCHARGE NOTE PROVIDER - CARE PROVIDER_API CALL
Britta-Srikanth Ramirez  Orthopaedic Surgery  [address] [place] [address]  Phone: [phone]  Fax: [phone]  Scheduled Appointment: 06/15/2023

## 2023-05-26 NOTE — PHYSICAL THERAPY INITIAL EVALUATION ADULT - ASSISTIVE DEVICE: SCOOT/BRIDGE, REHAB EVAL
January 18, 2023       Lacho Patricia MD  [address]  [facility] [address]  Via Fax: [phone]      Patient: Esther Rowland   YOB: 2015   Date of Visit: 1/18/2023       Dear Dr. Patricia:    I saw your patient, Esther Rowland, for an evaluation. Below are my notes for this visit with her.    If you have questions, please do not hesitate to call me.      Sincerely,        Fredo Horner MD        CC: No Recipients  Fredo Horner MD  1/18/2023  9:09 PM  Signed  PCP--Lacho Patricia MD      HPI  I saw Esther and her parents at [facility] today for follow-up of dizziness/autonomic dysfunction and palpitations. Since I last saw her on 12/5/22, she had a tilt table test at [facility] on 12/29/22 that was complicated by a seizure, and she was brought to the ED for management. According to the ED notes, \"Esther was seen in our Emergency Department after having an episode of abnormal movements during her testing this morning. We gave her IV fluids for dehydration and Zofran for vomiting. Also did an ECG that was normal and an EEG.\" I received the final report from the tilt table test today. I spoke with her mom by phone the first week of January 2023, and she told me about the tilt table and ED visit. Since then, Esther has been well overall and had some improvement in nutrition (still a picky eater) and fluid/salt intake. Some dizziness and no syncope. No significant palpitations. She enjoys school, and her teachers do a good job watching out for her.     When I last saw Esther on 12/5/22, she had seen Dr. Chávez nearly 1 year before, and her atenolol was discontinued not long after her dose was decreased; palpitations were not much of an issue. Beginning at the end of August 2022 as school started, she began to have postural dizziness and would see the school nurse--sometimes her BP was a bit low (per mom). No check of blood sugar. No syncope and no associated palpitations, SOB, 
Please let patient know that her CT chest was essentially normal. There was a prominent nonspecific lymph node that did not seem to concern the radiologist. There was no acute abnormality. I think that we should also pursue a cardiology evaluation. The next best thing is a referral to both cardiology and pulmonary medicine. If she agree's let me know. Thanks!
dyspnea, cyanosis, or chest pain. She has always been a picky eater and has a low protein diet overall. Fruity Susana for breakfast, when she has time in the morning--she would rather sleep. No GI symptoms noted. Her mother worked with her to hold off on going to the nurse and deal with symptoms herself, and they improved.  Her growth and development was normal, and she was usually able to keep up with her friends and teammates--basketball and hip-hop.      When I saw her on 7/14/21, her parents had purchased an [employer] and tried to record Esther's events; however, because the episodes were not that long and they had difficulty getting her fingers to contact the pad and give an accurate ECG, they stopped checking. She had brief random events lasting 5 minutes or less and without significant associated symptoms of chest pain, pallor, cyanosis, weakness, emesis, dizziness, or syncope. SVT was never proven--and her sister Sylvia had successful ablation for WPW.  On 8/28/21, I received a call from [facility]'s ED that Esther came in with a near syncopal event during an episode of palpitations. HR /minute and appeared sinus with artifact in the baseline. \"Atrial flutter\" stated on the screen. Even if true AF (it wasn't), that rate isn't high enough to cause pre/syncope. There appeared to be something else going on. The ED contacted Dr. Chávez, and he evaluated her on 12/9/21. She was sent home from the ED with an event monitor, but she did not have any symptoms while wearing that monitor.  Esther then had a stress test to try to reproduce the symptoms; however, the stress test was normal, with no arrhythmias and no symptoms. A 7 day Holter was placed that showed sinus tachycardia (max 168/min) during her 27 recorded events, and atenolol was started. She was reevaluated by Dr. Chávez on 1/2/22, and at that time she had less heart racing symptoms while taking 10 mg once daily.  She complained of feeling weakness in her arms and 
legs and the feeling of falling. Her exam and ECG were normal, and the dose was decreased to 5 mg once daily. A follow-up with Dr. Chávez was requested for 6 months later.      On 12/29/2022, her tilt table test showed: LOC 4 minutes into her baseline tilt with HR 59/minute and BP 76/44 mmHg (initial /64 mmHg). Prodrome of whimpering and discomfort (/minute), then became hypertonic and clenched right fist. Table lowered, and responsive 45 seconds later. Her cerebral NIRS decreased significantly as she lost consciousness then returned to normal.     Her ECG on 12/5/2022 showed NSR and borderline RAD. There was a normal QTc and no delta wave.      A 7-day Holter was placed starting on 11/2/21.  1.  The predominant rhythm was sinus rhythm.  2.  The average heart rate was normal (101 bpm).  The heart rate range was normal ( bpm).  3.  The QRS morphology was normal.  4.  There were no abnormal pauses.    5.  There were no premature atrial complexes and no runs of supraventricular tachycardia.  6.  There were no premature ventricular complexes and no runs of ventricular tachycardia.  7.  There were 27 patient-triggered events which corresponded to sinus rhythm and sinus tachycardia.  No arrhythmias were detected.  8.  Summary:  The Holter study was normal.  Dr. Chávez spoke with Esther's mother about these results.  We will plan on ordering atenolol 10mg PO once per day to see if this may help her heart racing symptoms (for suspected inappropriate sinus tachycardia).     On 10/26/21, her stress test showed: Treadmill exercise testing was performed using the Jose protocol. The patient exercised for 10 min 14 sec, to protocol stage 4, to a maximal work rate of 13.4mets. Exercise was  terminated due to fatigue.  Impressions:  Normal exercise treadmill stress test. No symptoms reported. Total endurance between the 25th and 50th percentiles. No arrhythmia and no ST changes or T wave abnormality.     On 
8/28/21, her CBC showed mild anemia with hgb 11.1 mg/dl. Her follow up CBC on 10/31/22 showed hgb of 12.2 (normal). TSH and T4 and metabolic panel were normal.     Her ECG on 7/14/2021 showed NSR and was normal, including a normal QTc and no delta wave.       Her ECG on 3/24/2020 showed NSR and was normal--no delta wave and normal QTc.  Her echocardiogram showed normal ventricular systolic performance and dimensions, no shunts, and no outflow tract obstruction.        Review of Systems  Const: no fever, no failure to gain weight, not tiring easily.   Eyes: no vision problems.   ENT: no nasal discharge.   CV: no chest discomfort; intermittent palpitations per HPI.   Resp: no cough, no dyspnea, no noisy breathing, no rapid breathing, no exercise intolerance.   GI: no abdominal pain.   Neuro: no syncope or seizures.  Recurrent dizziness.  Skin: no pallor.   Heme/Lymph: no tendency for easy bleeding.   Psych: no sleep disturbance.         Physical Examination  Constitutional: well developed and nourished child who was happy and engaging at first; however, a few minutes into my interview, as I discussed her past tilt test and episode of syncope with seizure activity, she became acutely pale and weak and began to fade into her chair while seated. Her father cradled her and placed her supine on the exam table, where she maintained consciousness and began to hyperventilate. HR low 50's (baseline 105/minute).  Her parents calmed her, and her color returned to normal. Her HR increased to the low 90's. She shivered a bit and felt warmer when covered. She remained supine for 30 minutes while her parents and I discussed the event together.      Head and Face: normocephalic.      Eyes: normal conjunctiva.      ENT: normal appearing outer ear and normal appearing nose. no nasal discharge. normal lips. oral mucosa pink.      Neck:  supple neck.       Chest: no thoracic asymmetry, no bulging precordium and no chest deformity. 
     Pulmonary: no respiratory distress, normal respiratory rate and effort and no accessory muscle use. breath sounds clear to auscultation bilaterally.      Cardiovascular: The apical impulse was normal. no thrill palpable. The heart rate was normal. The rhythm was regular. Normal S1, well split S2, no gallop, no S3, no S4, no click, no pericardial rub. No murmur. Radial Pulse: right 2+ and left 2+. Femoral Pulse: right 2+ and left 2+. no pulse delay and capillary refill < 2 secs. Lower Extremities: no edema present.      Abdomen: soft, nontender and nondistended. no hepatomegaly.      Lymphatic: no cervical lymphadenopathy.      Musculoskeletal: grossly normal gait. no clubbing of the fingernails. moves all 4 extremities. muscle strength and tone were grossly normal. Hyperflexible hands, wrist, and fingers.      Neurologic: cranial nerves grossly intact. no coordination deficits observed and developmentally appropriate for age. alert.      Skin:  no central cyanosis and no peripheral cyanosis. normal skin turgor.            Testing  No tests were performed today.        Diagnosis      Autonomic dysfunction  Palpitations--inappropriate sinus tachycardia        Discussion  Esther showed a vasovagal response in clinic today after becoming acutely nervous/anxious, and her heart rate decreased acutely with her near syncope and increased appropriately after she was supine. I did not have a BP cuff at hand during her acute symptoms; however, she had normal femoral pulses and reasonable perfusion within seconds of her near syncope. She proved her autonomic dysfunction today and also by her recent tilt table test. Her parents had requested a referral for the test, and I agreed that she could see the autonomic dysfunction team at [facility] and let them decide if a tilt table test was appropriate.  Her PMD ordered the test at the direction of her parents. While in the ED at [facility] after her syncope, she was directed to see a 
pediatric neurologist at [facility], but such has not been arranged. Her hypertonia was likely related to decreased cerebral blood flow rather than a primary neurological disorder.  Esther has done better with her nutrition and fluid intake, but she remains a picky eater. She has been able to go to school and enjoy her friends and her work there. I suspect that today's vasovagal event was triggered by re-imagining her tilt table/ED experience and becoming anxious by talking about it. Although having enough food and fluid in general would be helpful to help prevent events, her anxiety may trump any improved vascular tone until she can develop strategies to sharon an episode. Her parents did a wonderful job today staying calm and helping Esther get through her near syncope. My colleague, Dr. Jayleen Zarate, practices integrative medicine and has a great interest in patients with autonomic dysfunction. I spoke with her today after the visit and explained Esther's case; I believe Dr. Zarate can offer Esther and her family excellent understanding and care going forward to manage her symptoms, and I gave Esther's parents her contact information. Evaluation with neurology may still be useful, but I would like for Dr. Zarate to evaluate Esther first and see how she progresses. I did not start new medication today, and there is no indication for SBE prophylaxis. No restriction on her activities, but she and her parents should be aware of palpitations and dizziness and contact me or one of my colleagues events become more severe.  She should maintain increased daily fluid intake, have supported salt intake, avoid caffeine, try to eat regular meals with more protein, and get plenty of sleep. She should practice self-limitation and be mindful of events, sitting or lying down as needed.  No cardiac contraindication for sedation or anesthesia.     Outpatient cardiology follow-up will be determined by her response to Dr. Zarate's treatment, and I 
asked her parents to contact me as needed.      I explained everything to Esther and her parents and answered their questions. Please call me for any questions about today's visit, and thank you for letting me care for Esther today. 50 minutes total--chart review, history, physical, testing interpretation, and discussion with family.          Fredo Horner MD  [facility]    
bed rails

## 2023-05-26 NOTE — OCCUPATIONAL THERAPY INITIAL EVALUATION ADULT - BATHING, PREVIOUS LEVEL OF FUNCTION, OT EVAL
Anesthesia Evaluation     Patient summary reviewed and Nursing notes reviewed   NPO Solid Status: > 8 hours  NPO Liquid Status: > 2 hours           Airway   Mallampati: II  TM distance: >3 FB  Neck ROM: full  No difficulty expected  Dental    (+) edentulous    Pulmonary - normal exam    breath sounds clear to auscultation  (+) asthma,shortness of breath, sleep apnea,     ROS comment: No CPAP  Cardiovascular     ECG reviewed  Rhythm: regular  Rate: normal    (+) hypertension 2 medications or greater, valvular problems/murmurs TI and AS, CAD, dysrhythmias Paroxysmal Atrial Fib, angina, hyperlipidemia,     ROS comment: Hx AVR about 13 years ago/EF 67% by ECHO 3/23, negative saline test by ECHO 4/10/23/mild ascending aortic aneurysm 4cm/pulmonary HTN  PE comment: Feels like NSR    Neuro/Psych  (+) CVA, headaches, dizziness/light headedness, psychiatric history Anxiety,    GI/Hepatic/Renal/Endo    (+)  GERD,  renal disease CRI, diabetes mellitus type 2, thyroid problem hypothyroidism    Musculoskeletal     (+) neck pain,   Abdominal  - normal exam   Substance History      OB/GYN          Other   arthritis,                    Anesthesia Plan    ASA 3     general     intravenous induction     Anesthetic plan, risks, benefits, and alternatives have been provided, discussed and informed consent has been obtained with: patient.        CODE STATUS:    Code Status (Patient has no pulse and is not breathing): CPR (Attempt to Resuscitate)  Medical Interventions (Patient has pulse or is breathing): Full Support  Release to patient: Routine Release       independent

## 2023-05-26 NOTE — ASU PATIENT PROFILE, ADULT - FALL HARM RISK - HARM RISK INTERVENTIONS

## 2023-05-26 NOTE — OCCUPATIONAL THERAPY INITIAL EVALUATION ADULT - ADDITIONAL COMMENTS
PLOF obtained from patient and daughter.   (+) driving   Pt states has sock aid, long handled shoe horn at home.

## 2023-05-26 NOTE — PRE-ANESTHESIA EVALUATION ADULT - NSRADCARDRESULTSFT_GEN_ALL_CORE
EKG: Sinus bradycardia, T wave abnormality, consider lateral ischemia  CXR: No radiographic evidence of acute cardiopulmonary disease.

## 2023-05-26 NOTE — OCCUPATIONAL THERAPY INITIAL EVALUATION ADULT - GENERAL OBSERVATIONS, REHAB EVAL
13:30-14:04; Chart reviewed, ok to treat by Occupational Therapist as confirmed by RN Karen, Pt received seated in bedside chair + aquacel right hip + IV disconnected by RN with family present in NAD. Pt in agreement with OT IE.

## 2023-05-26 NOTE — DISCHARGE NOTE PROVIDER - HOSPITAL COURSE
83y Femaleunderwent a right jeremiah on 5/26/2023. Patient tolerated surgery well with no intra/post operative complications. Patient was given intra/post operative antibiotics for infection prophylaxis. Patient will be discharged on aspirin 81mg BID x 4 weeks to prevent blood clots. Patient worked with Physical Therapy while admitted to the hospital. Patient is stable for discharge. 83y Female underwent a right jeremiah on 5/26/2023. Patient tolerated surgery well with no intra/post operative complications. Patient was given intra/post operative antibiotics for infection prophylaxis. Patient will be discharged on aspirin 81mg BID x 4 weeks to prevent blood clots. Patient worked with Physical Therapy while admitted to the hospital. Patient is stable for discharge.

## 2023-05-27 ENCOUNTER — FORM ENCOUNTER (OUTPATIENT)
Age: 83
End: 2023-05-27

## 2023-05-27 ENCOUNTER — TRANSCRIPTION ENCOUNTER (OUTPATIENT)
Age: 83
End: 2023-05-27

## 2023-05-27 VITALS
DIASTOLIC BLOOD PRESSURE: 54 MMHG | HEART RATE: 61 BPM | RESPIRATION RATE: 16 BRPM | TEMPERATURE: 96 F | SYSTOLIC BLOOD PRESSURE: 96 MMHG

## 2023-05-27 LAB
ANION GAP SERPL CALC-SCNC: 12 MMOL/L — SIGNIFICANT CHANGE UP (ref 7–14)
BUN SERPL-MCNC: 53 MG/DL — HIGH (ref 10–20)
CALCIUM SERPL-MCNC: 8.7 MG/DL — SIGNIFICANT CHANGE UP (ref 8.4–10.5)
CHLORIDE SERPL-SCNC: 97 MMOL/L — LOW (ref 98–110)
CO2 SERPL-SCNC: 25 MMOL/L — SIGNIFICANT CHANGE UP (ref 17–32)
CREAT SERPL-MCNC: 1.9 MG/DL — HIGH (ref 0.7–1.5)
EGFR: 26 ML/MIN/1.73M2 — LOW
GLUCOSE SERPL-MCNC: 160 MG/DL — HIGH (ref 70–99)
HCT VFR BLD CALC: 29.6 % — LOW (ref 37–47)
HGB BLD-MCNC: 10 G/DL — LOW (ref 12–16)
MCHC RBC-ENTMCNC: 30.6 PG — SIGNIFICANT CHANGE UP (ref 27–31)
MCHC RBC-ENTMCNC: 33.8 G/DL — SIGNIFICANT CHANGE UP (ref 32–37)
MCV RBC AUTO: 90.5 FL — SIGNIFICANT CHANGE UP (ref 81–99)
NRBC # BLD: 0 /100 WBCS — SIGNIFICANT CHANGE UP (ref 0–0)
PLATELET # BLD AUTO: 239 K/UL — SIGNIFICANT CHANGE UP (ref 130–400)
PMV BLD: 10.4 FL — SIGNIFICANT CHANGE UP (ref 7.4–10.4)
POTASSIUM SERPL-MCNC: 5 MMOL/L — SIGNIFICANT CHANGE UP (ref 3.5–5)
POTASSIUM SERPL-SCNC: 5 MMOL/L — SIGNIFICANT CHANGE UP (ref 3.5–5)
RBC # BLD: 3.27 M/UL — LOW (ref 4.2–5.4)
RBC # FLD: 12.6 % — SIGNIFICANT CHANGE UP (ref 11.5–14.5)
SODIUM SERPL-SCNC: 134 MMOL/L — LOW (ref 135–146)
WBC # BLD: 12.6 K/UL — HIGH (ref 4.8–10.8)
WBC # FLD AUTO: 12.6 K/UL — HIGH (ref 4.8–10.8)

## 2023-05-27 PROCEDURE — 99221 1ST HOSP IP/OBS SF/LOW 40: CPT

## 2023-05-27 RX ADMIN — Medication 650 MILLIGRAM(S): at 11:13

## 2023-05-27 RX ADMIN — Medication 650 MILLIGRAM(S): at 06:20

## 2023-05-27 RX ADMIN — Medication 650 MILLIGRAM(S): at 05:51

## 2023-05-27 RX ADMIN — Medication 25 MILLIGRAM(S): at 05:51

## 2023-05-27 RX ADMIN — Medication 100 MICROGRAM(S): at 05:52

## 2023-05-27 RX ADMIN — Medication 650 MILLIGRAM(S): at 11:43

## 2023-05-27 RX ADMIN — Medication 1 TABLET(S): at 11:13

## 2023-05-27 RX ADMIN — PANTOPRAZOLE SODIUM 40 MILLIGRAM(S): 20 TABLET, DELAYED RELEASE ORAL at 05:55

## 2023-05-27 RX ADMIN — Medication 81 MILLIGRAM(S): at 05:54

## 2023-05-27 RX ADMIN — VALSARTAN 160 MILLIGRAM(S): 80 TABLET ORAL at 05:52

## 2023-05-27 RX ADMIN — Medication 15 MILLIGRAM(S): at 05:55

## 2023-05-27 RX ADMIN — BRIMONIDINE TARTRATE 1 DROP(S): 2 SOLUTION/ DROPS OPHTHALMIC at 05:52

## 2023-05-27 RX ADMIN — Medication 15 MILLIGRAM(S): at 06:08

## 2023-05-27 NOTE — DISCHARGE NOTE NURSING/CASE MANAGEMENT/SOCIAL WORK - PATIENT PORTAL LINK FT
You can access the FollowMyHealth Patient Portal offered by [facility] by registering at the following website: [email or web address]. By joining [employer]’s FollowMyHealth portal, you will also be able to view your health information using other applications (apps) compatible with our system.

## 2023-05-27 NOTE — CONSULT NOTE ADULT - SUBJECTIVE AND OBJECTIVE BOX
Patient is a 83y old  Female who presents with a chief complaint of s/p right jeremiah  (26 May 2023 10:09)      SUBJECTIVE / OVERNIGHT EVENTS: Pt states she had a rough night with painful spasms in her right hip because her bed position and her right foot got caught in the SCD tubing. Otherwise has no pain or complaints. Having BMs and peeing well  ADDITIONAL REVIEW OF SYSTEMS: as above    MEDICATIONS  (STANDING):  acetaminophen     Tablet .. 650 milliGRAM(s) Oral every 6 hours  aspirin enteric coated 81 milliGRAM(s) Oral every 12 hours  atorvastatin 80 milliGRAM(s) Oral at bedtime  brimonidine 0.2% Ophthalmic Solution 1 Drop(s) Both EYES two times a day  chlorhexidine 2% Cloths 1 Application(s) Topical daily  dexAMETHasone     Tablet 2 milliGRAM(s) Oral once  dorzolamide 2% Ophthalmic Solution 1 Drop(s) Both EYES <User Schedule>  hydrochlorothiazide 12.5 milliGRAM(s) Oral daily  lactated ringers. 1000 milliLiter(s) (100 mL/Hr) IV Continuous <Continuous>  latanoprost 0.005% Ophthalmic Solution 1 Drop(s) Both EYES at bedtime  levothyroxine 100 MICROGram(s) Oral daily  metoprolol succinate ER 25 milliGRAM(s) Oral daily  multivitamin 1 Tablet(s) Oral daily  pantoprazole    Tablet 40 milliGRAM(s) Oral before breakfast  polyethylene glycol 3350 17 Gram(s) Oral at bedtime  senna 2 Tablet(s) Oral at bedtime  timolol 0.5% Solution 1 Drop(s) Both EYES two times a day  valsartan 160 milliGRAM(s) Oral daily    MEDICATIONS  (PRN):  HYDROmorphone  Injectable 0.5 milliGRAM(s) IV Push every 10 minutes PRN Moderate Pain (4 - 6)  magnesium hydroxide Suspension 30 milliLiter(s) Oral daily PRN Constipation  ondansetron Injectable 4 milliGRAM(s) IV Push once PRN Nausea and/or Vomiting  ondansetron Injectable 4 milliGRAM(s) IV Push every 6 hours PRN Nausea and/or Vomiting  traMADol 50 milliGRAM(s) Oral every 4 hours PRN Mild Pain (1 - 3)      CAPILLARY BLOOD GLUCOSE        I&O's Summary    26 May 2023 07:01  -  27 May 2023 07:00  --------------------------------------------------------  IN: 1220 mL / OUT: 1450 mL / NET: -230 mL        PHYSICAL EXAM:  Vital Signs Last 24 Hrs  T(C): 35.8 (27 May 2023 08:29), Max: 36 (27 May 2023 00:24)  T(F): 96.4 (27 May 2023 08:29), Max: 96.8 (27 May 2023 00:24)  HR: 61 (27 May 2023 08:29) (54 - 73)  BP: 96/54 (27 May 2023 08:29) (96/54 - 164/72)  BP(mean): --  RR: 16 (27 May 2023 08:29) (16 - 18)  SpO2: 95% (27 May 2023 04:00) (94% - 97%)    Parameters below as of 27 May 2023 04:00  Patient On (Oxygen Delivery Method): room air      CONSTITUTIONAL: NAD, well-developed, well-groomed  EYES: PERRLA; conjunctiva and sclera clear  ENMT: Moist oral mucosa, no pharyngeal injection or exudates; normal dentition  NECK: Supple, no palpable masses; no thyromegaly  RESPIRATORY: Normal respiratory effort; lungs are clear to auscultation bilaterally  CARDIOVASCULAR: Regular rate and rhythm, normal S1 and S2, no murmur/rub/gallop; No lower extremity edema; Peripheral pulses are 2+ bilaterally  ABDOMEN: Nontender to palpation, normoactive bowel sounds, no rebound/guarding; No hepatosplenomegaly  MUSCULOSKELETAL:  Normal gait; no clubbing or cyanosis of digits; no joint swelling or tenderness to palpation  PSYCH: A+O to person, place, and time; affect appropriate  NEUROLOGY: CN 2-12 are intact and symmetric; no gross sensory deficits   SKIN: No rashes; no palpable lesions    LABS:                        10.0   12.60 )-----------( 239      ( 27 May 2023 07:00 )             29.6     05-27    134<L>  |  97<L>  |  53<H>  ----------------------------<  160<H>  5.0   |  25  |  1.9<H>    Ca    8.7      27 May 2023 07:00                  RADIOLOGY & ADDITIONAL TESTS:  Results Reviewed:   Imaging Personally Reviewed:  Electrocardiogram Personally Reviewed:    COORDINATION OF CARE:  Care Discussed with Consultants/Other Providers [Y/N]:  Prior or Outpatient Records Reviewed [Y/N]:         Patient is a 83y old  Female who presents with a chief complaint of s/p right jeremiah  (26 May 2023 10:09)      SUBJECTIVE / OVERNIGHT EVENTS: Pt states she had a rough night with painful spasms in her right hip because her bed position and her right foot got caught in the SCD tubing. Otherwise has no pain or complaints. Having BMs and peeing well  ADDITIONAL REVIEW OF SYSTEMS: as above    MEDICATIONS  (STANDING):  acetaminophen     Tablet .. 650 milliGRAM(s) Oral every 6 hours  aspirin enteric coated 81 milliGRAM(s) Oral every 12 hours  atorvastatin 80 milliGRAM(s) Oral at bedtime  brimonidine 0.2% Ophthalmic Solution 1 Drop(s) Both EYES two times a day  chlorhexidine 2% Cloths 1 Application(s) Topical daily  dexAMETHasone     Tablet 2 milliGRAM(s) Oral once  dorzolamide 2% Ophthalmic Solution 1 Drop(s) Both EYES <User Schedule>  hydrochlorothiazide 12.5 milliGRAM(s) Oral daily  lactated ringers. 1000 milliLiter(s) (100 mL/Hr) IV Continuous <Continuous>  latanoprost 0.005% Ophthalmic Solution 1 Drop(s) Both EYES at bedtime  levothyroxine 100 MICROGram(s) Oral daily  metoprolol succinate ER 25 milliGRAM(s) Oral daily  multivitamin 1 Tablet(s) Oral daily  pantoprazole    Tablet 40 milliGRAM(s) Oral before breakfast  polyethylene glycol 3350 17 Gram(s) Oral at bedtime  senna 2 Tablet(s) Oral at bedtime  timolol 0.5% Solution 1 Drop(s) Both EYES two times a day  valsartan 160 milliGRAM(s) Oral daily    MEDICATIONS  (PRN):  HYDROmorphone  Injectable 0.5 milliGRAM(s) IV Push every 10 minutes PRN Moderate Pain (4 - 6)  magnesium hydroxide Suspension 30 milliLiter(s) Oral daily PRN Constipation  ondansetron Injectable 4 milliGRAM(s) IV Push once PRN Nausea and/or Vomiting  ondansetron Injectable 4 milliGRAM(s) IV Push every 6 hours PRN Nausea and/or Vomiting  traMADol 50 milliGRAM(s) Oral every 4 hours PRN Mild Pain (1 - 3)      CAPILLARY BLOOD GLUCOSE        I&O's Summary    26 May 2023 07:01  -  27 May 2023 07:00  --------------------------------------------------------  IN: 1220 mL / OUT: 1450 mL / NET: -230 mL        PHYSICAL EXAM:  Vital Signs Last 24 Hrs  T(C): 35.8 (27 May 2023 08:29), Max: 36 (27 May 2023 00:24)  T(F): 96.4 (27 May 2023 08:29), Max: 96.8 (27 May 2023 00:24)  HR: 61 (27 May 2023 08:29) (54 - 73)  BP: 96/54 (27 May 2023 08:29) (96/54 - 164/72)  BP(mean): --  RR: 16 (27 May 2023 08:29) (16 - 18)  SpO2: 95% (27 May 2023 04:00) (94% - 97%)    Parameters below as of 27 May 2023 04:00  Patient On (Oxygen Delivery Method): room air      CONSTITUTIONAL: NAD, well-developed, well-groomed  EYES: PERRLA; conjunctiva and sclera clear  ENMT: Moist oral mucosa, no pharyngeal injection or exudates; normal dentition  NECK: Supple, no palpable masses; no thyromegaly  RESPIRATORY: Normal respiratory effort; lungs are clear to auscultation bilaterally  CARDIOVASCULAR: Regular rate and rhythm, normal S1 and S2, no murmur/rub/gallop; No lower extremity edema; Peripheral pulses are 2+ bilaterally  ABDOMEN: Nontender to palpation, normoactive bowel sounds, no rebound/guarding; No hepatosplenomegaly  MUSCULOSKELETAL:  antalgic gait; no clubbing or cyanosis of digits; no joint swelling or tenderness to palpation  PSYCH: A+O to person, place, and time; affect appropriate  SKIN: No rashes; no palpable lesions    LABS:                        10.0   12.60 )-----------( 239      ( 27 May 2023 07:00 )             29.6     05-27    134<L>  |  97<L>  |  53<H>  ----------------------------<  160<H>  5.0   |  25  |  1.9<H>    Ca    8.7      27 May 2023 07:00                  RADIOLOGY & ADDITIONAL TESTS:  Results Reviewed:   Imaging Personally Reviewed:  Electrocardiogram Personally Reviewed:    COORDINATION OF CARE:  Care Discussed with Consultants/Other Providers [Y/N]:  Prior or Outpatient Records Reviewed [Y/N]:

## 2023-05-27 NOTE — CONSULT NOTE ADULT - ASSESSMENT
84 yo F with pmhx of HTN, HLD, hypothyroidism presenting POD #1 for a RT EVERARDO      #POD 1 for a RT EVERARDO  - pt has minimal complications  - completed periop abx  - PT/OT  - OOB/WBT  - vte proph  - incentive spirometry  - Na low in the setting of IVF  - however, pt has CELI and will need continued IVF  - baseline Cr is 1.4 and today's Cr is 1.9  - would recommend continued IVF and repeat Cr to ensure its normalized to patient's baseline  - pain control  - bowel regimen  - follow up op with ortho upon discharge

## 2023-05-27 NOTE — DISCHARGE NOTE NURSING/CASE MANAGEMENT/SOCIAL WORK - NSDCPEFALRISK_GEN_ALL_CORE
For information on Fall & Injury Prevention, visit: [email or web address] OR  [email or web address] OR  https://www.cdc.gov/steadi/patient.html

## 2023-05-30 LAB — SURGICAL PATHOLOGY STUDY: SIGNIFICANT CHANGE UP

## 2023-05-31 DIAGNOSIS — G47.33 OBSTRUCTIVE SLEEP APNEA (ADULT) (PEDIATRIC): ICD-10-CM

## 2023-05-31 DIAGNOSIS — Z79.890 HORMONE REPLACEMENT THERAPY: ICD-10-CM

## 2023-05-31 DIAGNOSIS — I25.10 ATHEROSCLEROTIC HEART DISEASE OF NATIVE CORONARY ARTERY WITHOUT ANGINA PECTORIS: ICD-10-CM

## 2023-05-31 DIAGNOSIS — Z88.1 ALLERGY STATUS TO OTHER ANTIBIOTIC AGENTS STATUS: ICD-10-CM

## 2023-05-31 DIAGNOSIS — H35.30 UNSPECIFIED MACULAR DEGENERATION: ICD-10-CM

## 2023-05-31 DIAGNOSIS — H40.9 UNSPECIFIED GLAUCOMA: ICD-10-CM

## 2023-05-31 DIAGNOSIS — Z90.49 ACQUIRED ABSENCE OF OTHER SPECIFIED PARTS OF DIGESTIVE TRACT: ICD-10-CM

## 2023-05-31 DIAGNOSIS — I10 ESSENTIAL (PRIMARY) HYPERTENSION: ICD-10-CM

## 2023-05-31 DIAGNOSIS — Z79.82 LONG TERM (CURRENT) USE OF ASPIRIN: ICD-10-CM

## 2023-05-31 DIAGNOSIS — E66.9 OBESITY, UNSPECIFIED: ICD-10-CM

## 2023-05-31 DIAGNOSIS — E78.5 HYPERLIPIDEMIA, UNSPECIFIED: ICD-10-CM

## 2023-05-31 DIAGNOSIS — E03.9 HYPOTHYROIDISM, UNSPECIFIED: ICD-10-CM

## 2023-05-31 DIAGNOSIS — M16.11 UNILATERAL PRIMARY OSTEOARTHRITIS, RIGHT HIP: ICD-10-CM

## 2023-05-31 DIAGNOSIS — Z91.040 LATEX ALLERGY STATUS: ICD-10-CM

## 2023-05-31 DIAGNOSIS — Z88.0 ALLERGY STATUS TO PENICILLIN: ICD-10-CM

## 2023-05-31 DIAGNOSIS — Z95.1 PRESENCE OF AORTOCORONARY BYPASS GRAFT: ICD-10-CM

## 2023-06-06 ENCOUNTER — RX RENEWAL (OUTPATIENT)
Age: 83
End: 2023-06-06

## 2023-06-15 ENCOUNTER — APPOINTMENT (OUTPATIENT)
Dept: ORTHOPEDIC SURGERY | Facility: CLINIC | Age: 83
End: 2023-06-15
Payer: MEDICARE

## 2023-06-15 ENCOUNTER — RESULT CHARGE (OUTPATIENT)
Age: 83
End: 2023-06-15

## 2023-06-15 PROBLEM — E66.9 OBESITY, UNSPECIFIED: Chronic | Status: ACTIVE | Noted: 2023-05-18

## 2023-06-15 PROCEDURE — 73521 X-RAY EXAM HIPS BI 2 VIEWS: CPT

## 2023-06-15 PROCEDURE — 99024 POSTOP FOLLOW-UP VISIT: CPT

## 2023-06-15 NOTE — HISTORY OF PRESENT ILLNESS
[de-identified] : s/p Right EVERARDO\par doing well\par postop course uncomplicated, home PT complete, progressing appropriately. \par pain controlled\par (-)n/v/cp/sob\par \par Right hip exam shows well healed incision\par NTTP\par No pain with PROM\par \par Imaging: \par AP and Lateral views were obtained of the hips, including the contralateral left hip for comparison and assessment of leg length.\par X-rays are negative for acute bone or soft tissue trauma.\par Right hip replacement in good alignment without radiographic evidence of complication.\par \par Plan:\par continue home exercise\par activities as tolerated\par f/u at 6 weeks.\par

## 2023-07-05 ENCOUNTER — APPOINTMENT (OUTPATIENT)
Dept: PULMONOLOGY | Facility: CLINIC | Age: 83
End: 2023-07-05
Payer: MEDICARE

## 2023-07-05 VITALS
SYSTOLIC BLOOD PRESSURE: 122 MMHG | HEIGHT: 61 IN | WEIGHT: 173 LBS | DIASTOLIC BLOOD PRESSURE: 60 MMHG | BODY MASS INDEX: 32.66 KG/M2 | HEART RATE: 68 BPM | RESPIRATION RATE: 14 BRPM | OXYGEN SATURATION: 97 %

## 2023-07-05 PROCEDURE — 99213 OFFICE O/P EST LOW 20 MIN: CPT

## 2023-07-05 NOTE — HISTORY OF PRESENT ILLNESS
[TextBox_4] : 83-year-old female with history of CAD status post CABG and aortic valve replacement, hypertension, hyperlipidemia, history of DEXTER not very compliant with CPAP, presenting for sleep apnea evaluation and preoperative clearance prior to hip replacement surgery.  She was told that she will be undergoing local regional anesthesia with a nerve block.  She does not know the severity of her DEXTER and her sleep study was done a long time ago.  She will need a new sleep study and she opted for home sleep study and prefers not to do an in lab sleep study.\par \par 7/5/23: Insurance denied her home sleep test. In lab sleep study ordered. Underwent hip replacement without any issues.

## 2023-07-21 ENCOUNTER — APPOINTMENT (OUTPATIENT)
Dept: SLEEP CENTER | Facility: HOSPITAL | Age: 83
End: 2023-07-21
Payer: MEDICARE

## 2023-07-21 ENCOUNTER — OUTPATIENT (OUTPATIENT)
Dept: OUTPATIENT SERVICES | Facility: HOSPITAL | Age: 83
LOS: 1 days | Discharge: HOME | End: 2023-07-21
Payer: MEDICARE

## 2023-07-21 DIAGNOSIS — G47.33 OBSTRUCTIVE SLEEP APNEA (ADULT) (PEDIATRIC): ICD-10-CM

## 2023-07-21 DIAGNOSIS — Z98.890 OTHER SPECIFIED POSTPROCEDURAL STATES: Chronic | ICD-10-CM

## 2023-07-21 DIAGNOSIS — I25.810 ATHEROSCLEROSIS OF CORONARY ARTERY BYPASS GRAFT(S) WITHOUT ANGINA PECTORIS: Chronic | ICD-10-CM

## 2023-07-21 DIAGNOSIS — Z96.641 PRESENCE OF RIGHT ARTIFICIAL HIP JOINT: Chronic | ICD-10-CM

## 2023-07-21 DIAGNOSIS — Z90.49 ACQUIRED ABSENCE OF OTHER SPECIFIED PARTS OF DIGESTIVE TRACT: Chronic | ICD-10-CM

## 2023-07-21 DIAGNOSIS — E89.0 POSTPROCEDURAL HYPOTHYROIDISM: Chronic | ICD-10-CM

## 2023-07-21 PROCEDURE — 95811 POLYSOM 6/>YRS CPAP 4/> PARM: CPT

## 2023-07-21 PROCEDURE — 95811 POLYSOM 6/>YRS CPAP 4/> PARM: CPT | Mod: 26

## 2023-07-25 DIAGNOSIS — G47.33 OBSTRUCTIVE SLEEP APNEA (ADULT) (PEDIATRIC): ICD-10-CM

## 2023-08-01 ENCOUNTER — APPOINTMENT (OUTPATIENT)
Dept: ORTHOPEDIC SURGERY | Facility: CLINIC | Age: 83
End: 2023-08-01
Payer: MEDICARE

## 2023-08-01 PROCEDURE — 99024 POSTOP FOLLOW-UP VISIT: CPT

## 2023-08-01 PROCEDURE — 20610 DRAIN/INJ JOINT/BURSA W/O US: CPT | Mod: 79,RT

## 2023-08-01 NOTE — HISTORY OF PRESENT ILLNESS
[de-identified] : fu of right hip doing well with that no pain has pain of right lateral knee JLT over lateral side and anteriorly over patella tendon  will do cortisone inj right knee Prior to injection, risks and benefits of the procedure were discussed, including but not limited to pain, swelling, failure to improve symptoms and in rare cases infection or allergic reaction.  The knee was prepped and draped with betadine and alcohol.  Using sterile technique 1cc of 40mg/cc kenalog solution mixed with 4cc of 1% lidocaine was injected thru an anterolateral portal using a 22guage needle.  Upon withdrawing the needle pressure was applied with to the injection site for approximately 1 minute.  Once hemostasis was achieved a band-aid dressing was applied.  The patient tolerated the procedure well. f/u in6 months.

## 2023-08-02 ENCOUNTER — APPOINTMENT (OUTPATIENT)
Dept: CARDIOLOGY | Facility: CLINIC | Age: 83
End: 2023-08-02
Payer: MEDICARE

## 2023-08-02 VITALS
SYSTOLIC BLOOD PRESSURE: 112 MMHG | BODY MASS INDEX: 32.5 KG/M2 | HEART RATE: 62 BPM | DIASTOLIC BLOOD PRESSURE: 78 MMHG | WEIGHT: 172 LBS

## 2023-08-02 DIAGNOSIS — J30.2 OTHER SEASONAL ALLERGIC RHINITIS: ICD-10-CM

## 2023-08-02 PROCEDURE — 93000 ELECTROCARDIOGRAM COMPLETE: CPT

## 2023-08-02 PROCEDURE — 99214 OFFICE O/P EST MOD 30 MIN: CPT

## 2023-08-02 NOTE — HISTORY OF PRESENT ILLNESS
[FreeTextEntry1] : Former patient of Dr. Fox.  83 F with a history of CAD s/p 3v CABG / AVR, HTN, HLD, DEXTER on CPAP. Right THR on 5/26/2023.  Denies any CP, SOB, palpitations, orthopnea/PND, dizziness or syncope Chronic fatigue / frequent napping after her Synthroid dose was decreased Stopped taking Rosuvastatin due to myalgias Statin intolerant - tried 4 others in the past   Adenosine NST (7/2019):  No ischemia

## 2023-08-02 NOTE — ASSESSMENT
[FreeTextEntry1] : 83 yo F with CAD s/p 3v CABG / AVR. Right THR on 5/26/2023.   Continue aspirin and Zetia Continue Metoprolol succinate 25 mg daily Continue Valsartan-HCTZ 160-12.5 mg

## 2023-08-02 NOTE — DISCUSSION/SUMMARY
[FreeTextEntry1] : RCRI = 1, 6% risk (30-day risk of death, MI, or cardiac arrest) Moderate-risk for perioperative MACE Antibiotic prophylaxis is required prior to procedures/surgery

## 2023-08-04 RX ORDER — AZELASTINE HYDROCHLORIDE 137 UG/1
0.1 SPRAY, METERED NASAL TWICE DAILY
Qty: 3 | Refills: 2 | Status: ACTIVE | COMMUNITY
Start: 2023-08-02 | End: 1900-01-01

## 2023-08-18 NOTE — DISCHARGE NOTE PROVIDER - NPI NUMBER (FOR SYSADMIN USE ONLY) :
called to bring in a urine sample stating his wife has a UTI. I said that we couldn't just take a urine from her she would need to be seen. He stated she could not come in she was very sick. I stated to him that we did not have any appointment for today anyway which made him even more angry. He made the comment to me that I can do better then that, I apologized and said if I could make appointment I would but had nothing avail but if there was any cancellations I would call him. He again said she was to sick to come out. Then sarcastically said to me what would you do young lady, I responded that I would either go to the urgent care or take OTC Azo until I can be seen on Monday and he hung up on me.
[[phone]]

## 2023-08-26 NOTE — ASU PATIENT PROFILE, ADULT - NS SC CAGE ALCOHOL CUT DOWN
[FreeTextEntry1] : 61 yr old female with: 1. T2DM - suboptimal contol worsened by COPD exacerbations and steroids.  Intolerant to januvia and jardiace and Ozempic. Off MFN due to severe/worsening COPD. fasting hypoglycemia by history but significant daytime hyperglycemia - POC  today - decrease Glipizide ER 5 mg daily - increase trulicity to 4.5 mg weekly - start Prandial Humalog 2 units for every 50 above 150, RTO for insulin teaching - cont FS checks, test more, record numbers - yearly eye exam with ophthalmology  2. hypothyroid, no thyroid nodule on recent sonogram - cont LT4 125 mcg daily, TFTs normal 2/2023  3. hyperlipidemia - cont statin  4. Left adrenal gland thickening on Ct 9/2020 and  MRI done after showed normal adrenal gland, no further testing needed  5. Bone health - post menopausal, on chronic prednisone, osteoporosis on DXA in 9/2020, on Alendronate, repeat DXA needed  
no

## 2023-09-05 ENCOUNTER — APPOINTMENT (OUTPATIENT)
Dept: PULMONOLOGY | Facility: CLINIC | Age: 83
End: 2023-09-05
Payer: MEDICARE

## 2023-09-05 VITALS
DIASTOLIC BLOOD PRESSURE: 70 MMHG | WEIGHT: 170 LBS | OXYGEN SATURATION: 94 % | HEIGHT: 61 IN | BODY MASS INDEX: 32.1 KG/M2 | SYSTOLIC BLOOD PRESSURE: 110 MMHG

## 2023-09-05 DIAGNOSIS — Z96.642 PRESENCE OF LEFT ARTIFICIAL HIP JOINT: ICD-10-CM

## 2023-09-05 PROCEDURE — 99213 OFFICE O/P EST LOW 20 MIN: CPT

## 2023-09-05 NOTE — ASSESSMENT
[FreeTextEntry1] : Severe DEXTER  CPAP 13 cm  3 months follow up to assess compliance and residual AHI

## 2023-09-05 NOTE — HISTORY OF PRESENT ILLNESS
[TextBox_4] : 83-year-old female with history of CAD status post CABG and aortic valve replacement, hypertension, hyperlipidemia, history of DEXTER not very compliant with CPAP, presenting for sleep apnea evaluation and preoperative clearance prior to hip replacement surgery.  She was told that she will be undergoing local regional anesthesia with a nerve block.  She does not know the severity of her DEXTER and her sleep study was done a long time ago.  She will need a new sleep study and she opted for home sleep study and prefers not to do an in lab sleep study.  9/5/23: Severe DEXTER on PSMNG; CPAP 13 cm water with Nasal pillows ordered. 3 months follow up for compliance.

## 2023-12-04 ENCOUNTER — APPOINTMENT (OUTPATIENT)
Dept: PULMONOLOGY | Facility: CLINIC | Age: 83
End: 2023-12-04
Payer: COMMERCIAL

## 2023-12-04 VITALS
SYSTOLIC BLOOD PRESSURE: 110 MMHG | OXYGEN SATURATION: 98 % | DIASTOLIC BLOOD PRESSURE: 70 MMHG | HEIGHT: 61 IN | WEIGHT: 170 LBS | BODY MASS INDEX: 32.1 KG/M2 | HEART RATE: 87 BPM

## 2023-12-04 DIAGNOSIS — R04.0 EPISTAXIS: ICD-10-CM

## 2023-12-04 DIAGNOSIS — G47.30 SLEEP APNEA, UNSPECIFIED: ICD-10-CM

## 2023-12-04 PROCEDURE — 99213 OFFICE O/P EST LOW 20 MIN: CPT

## 2024-03-18 ENCOUNTER — APPOINTMENT (OUTPATIENT)
Dept: PULMONOLOGY | Facility: CLINIC | Age: 84
End: 2024-03-18

## 2024-03-26 ENCOUNTER — APPOINTMENT (OUTPATIENT)
Dept: ORTHOPEDIC SURGERY | Facility: CLINIC | Age: 84
End: 2024-03-26
Payer: COMMERCIAL

## 2024-03-26 PROCEDURE — 99204 OFFICE O/P NEW MOD 45 MIN: CPT

## 2024-03-26 NOTE — ASSESSMENT
[FreeTextEntry1] : Patient comes in with a lump left groin finger.  She says that it bothers her.  She has not had treatment.  Been going on for about 1 month.  She had a right ring finger trigger finger release in the past by Dr. Jovel who did a percutaneous release.  She does not have other complaints today.  L hand:  Mild swelling  Tender 3rd A1 pulley  Decreased middle ROM  +middle triggering  We again reviewed the anatomy of the flexor sheath and pathology of trigger fingers with the use of drawings and discussion.  The patient has failed injections/nonoperative treatment.  We discussed the possibility of surgery including the use of an open trigger finger release.  We discussed that too many injections may lead to weakening of the tendon/tendon rupture and that surgery is indicated at this point.  Risks include bleeding, infection, injury to nerves, vessels, tendons, stiffness, pain, loss of range of motion, loss of function, CRPS, and risks and complications of anesthesia.  We discussed the surgical plan and post op expectations.  We also discussed the possibility of prolonged pain/scar tissue and the possible need for therapy.  The patient is amenable to the risks, had the opportunity to ask questions, all questions were answered and the patient signed consent of their own accord.  They will be contacted by my surgical scheduler.

## 2024-03-28 ENCOUNTER — APPOINTMENT (OUTPATIENT)
Dept: CARDIOLOGY | Facility: CLINIC | Age: 84
End: 2024-03-28
Payer: COMMERCIAL

## 2024-03-28 VITALS
DIASTOLIC BLOOD PRESSURE: 66 MMHG | SYSTOLIC BLOOD PRESSURE: 112 MMHG | HEART RATE: 63 BPM | WEIGHT: 171 LBS | BODY MASS INDEX: 32.31 KG/M2

## 2024-03-28 DIAGNOSIS — I65.29 OCCLUSION AND STENOSIS OF UNSPECIFIED CAROTID ARTERY: ICD-10-CM

## 2024-03-28 DIAGNOSIS — Z95.1 PRESENCE OF AORTOCORONARY BYPASS GRAFT: ICD-10-CM

## 2024-03-28 DIAGNOSIS — R07.9 CHEST PAIN, UNSPECIFIED: ICD-10-CM

## 2024-03-28 PROCEDURE — 99214 OFFICE O/P EST MOD 30 MIN: CPT

## 2024-03-28 PROCEDURE — 93000 ELECTROCARDIOGRAM COMPLETE: CPT

## 2024-03-28 NOTE — DISCUSSION/SUMMARY
[FreeTextEntry1] : CAD s/p 3v CABG / AVR with Unspecified Angina   - TTE - Lexiscan NST - Carotid duplex - Continue aspirin and Zetia - Continue Metoprolol succinate 25 mg daily - Continue Valsartan-HCTZ 160-12.5 mg - Follow-up 6 weeks

## 2024-03-28 NOTE — PHYSICAL EXAM
[Well Developed] : well developed [No Acute Distress] : no acute distress [Normal Conjunctiva] : normal conjunctiva [Normal S1, S2] : normal S1, S2 [No Carotid Bruit] : no carotid bruit [No Murmur] : no murmur [No Rub] : no rub [Clear Lung Fields] : clear lung fields [Good Air Entry] : good air entry [No Respiratory Distress] : no respiratory distress  [Non Tender] : non-tender [Soft] : abdomen soft [No Masses/organomegaly] : no masses/organomegaly [No Edema] : no edema [Normal Gait] : normal gait [No Cyanosis] : no cyanosis [No Rash] : no rash [No Clubbing] : no clubbing [No Skin Lesions] : no skin lesions [No Focal Deficits] : no focal deficits [Moves all extremities] : moves all extremities [Alert and Oriented] : alert and oriented

## 2024-03-28 NOTE — HISTORY OF PRESENT ILLNESS
[FreeTextEntry1] : Former patient of Dr. Fox.  CAD s/p 3v CABG / AVR, HTN, HLD, DEXTER on CPAP Right THR on 5/26/2023  Left-sided chest discomfort/burning on/off exertion.  Symptoms started 2 months ago. Difficulty swallowing large bites of food and potatoes Traumatic extubation after surgery  Stopped taking Rosuvastatin due to myalgias Statin intolerant - tried 4 others in the past

## 2024-04-12 ENCOUNTER — OUTPATIENT (OUTPATIENT)
Dept: OUTPATIENT SERVICES | Facility: HOSPITAL | Age: 84
LOS: 1 days | End: 2024-04-12
Payer: COMMERCIAL

## 2024-04-12 ENCOUNTER — RESULT REVIEW (OUTPATIENT)
Age: 84
End: 2024-04-12

## 2024-04-12 DIAGNOSIS — E89.0 POSTPROCEDURAL HYPOTHYROIDISM: Chronic | ICD-10-CM

## 2024-04-12 DIAGNOSIS — Z96.641 PRESENCE OF RIGHT ARTIFICIAL HIP JOINT: Chronic | ICD-10-CM

## 2024-04-12 DIAGNOSIS — Z00.8 ENCOUNTER FOR OTHER GENERAL EXAMINATION: ICD-10-CM

## 2024-04-12 DIAGNOSIS — I25.810 ATHEROSCLEROSIS OF CORONARY ARTERY BYPASS GRAFT(S) WITHOUT ANGINA PECTORIS: Chronic | ICD-10-CM

## 2024-04-12 DIAGNOSIS — Z98.890 OTHER SPECIFIED POSTPROCEDURAL STATES: Chronic | ICD-10-CM

## 2024-04-12 DIAGNOSIS — Z90.49 ACQUIRED ABSENCE OF OTHER SPECIFIED PARTS OF DIGESTIVE TRACT: Chronic | ICD-10-CM

## 2024-04-12 PROCEDURE — 93880 EXTRACRANIAL BILAT STUDY: CPT | Mod: 26

## 2024-04-12 PROCEDURE — 93880 EXTRACRANIAL BILAT STUDY: CPT

## 2024-04-13 DIAGNOSIS — Z00.8 ENCOUNTER FOR OTHER GENERAL EXAMINATION: ICD-10-CM

## 2024-04-18 DIAGNOSIS — K56.699 OTHER INTESTINAL OBSTRUCTION UNSPECIFIED AS TO PARTIAL VERSUS COMPLETE OBSTRUCTION: ICD-10-CM

## 2024-04-24 ENCOUNTER — APPOINTMENT (OUTPATIENT)
Dept: CARDIOLOGY | Facility: CLINIC | Age: 84
End: 2024-04-24
Payer: COMMERCIAL

## 2024-04-24 PROCEDURE — 93306 TTE W/DOPPLER COMPLETE: CPT

## 2024-04-24 NOTE — ASU PATIENT PROFILE, ADULT - TEACHING/LEARNING OTHER LEARNERS
Patient wants refills because its such a circus getting a refill. Per patient  
Pharmacy requires a physical prescription.    Please print prescription, sign, and fax  
friend

## 2024-04-25 ENCOUNTER — OUTPATIENT (OUTPATIENT)
Dept: OUTPATIENT SERVICES | Facility: HOSPITAL | Age: 84
LOS: 1 days | Discharge: HOME | End: 2024-04-25
Payer: COMMERCIAL

## 2024-04-25 ENCOUNTER — TRANSCRIPTION ENCOUNTER (OUTPATIENT)
Age: 84
End: 2024-04-25

## 2024-04-25 ENCOUNTER — APPOINTMENT (OUTPATIENT)
Dept: ORTHOPEDIC SURGERY | Facility: AMBULATORY SURGERY CENTER | Age: 84
End: 2024-04-25

## 2024-04-25 VITALS
HEIGHT: 61 IN | TEMPERATURE: 98 F | SYSTOLIC BLOOD PRESSURE: 146 MMHG | HEART RATE: 60 BPM | RESPIRATION RATE: 20 BRPM | DIASTOLIC BLOOD PRESSURE: 67 MMHG | OXYGEN SATURATION: 98 % | WEIGHT: 169.98 LBS

## 2024-04-25 VITALS
TEMPERATURE: 98 F | DIASTOLIC BLOOD PRESSURE: 72 MMHG | RESPIRATION RATE: 24 BRPM | SYSTOLIC BLOOD PRESSURE: 163 MMHG | HEART RATE: 60 BPM | OXYGEN SATURATION: 99 %

## 2024-04-25 DIAGNOSIS — I25.810 ATHEROSCLEROSIS OF CORONARY ARTERY BYPASS GRAFT(S) WITHOUT ANGINA PECTORIS: Chronic | ICD-10-CM

## 2024-04-25 DIAGNOSIS — E89.0 POSTPROCEDURAL HYPOTHYROIDISM: Chronic | ICD-10-CM

## 2024-04-25 DIAGNOSIS — Z90.49 ACQUIRED ABSENCE OF OTHER SPECIFIED PARTS OF DIGESTIVE TRACT: Chronic | ICD-10-CM

## 2024-04-25 DIAGNOSIS — Z98.890 OTHER SPECIFIED POSTPROCEDURAL STATES: Chronic | ICD-10-CM

## 2024-04-25 DIAGNOSIS — M65.332 TRIGGER FINGER, LEFT MIDDLE FINGER: ICD-10-CM

## 2024-04-25 DIAGNOSIS — Z96.641 PRESENCE OF RIGHT ARTIFICIAL HIP JOINT: Chronic | ICD-10-CM

## 2024-04-25 PROCEDURE — 26055 INCISE FINGER TENDON SHEATH: CPT | Mod: F2

## 2024-04-25 NOTE — ASU PREOP CHECKLIST - NSWEIGHTCALCTOOLDRUG_GEN_A_CORE
abdominal pain n/v 4days
Order was written/H&P was completed/Contractions pattern was reviewed/FHR was reviewed/Induction / Augmentation was discussed
 used

## 2024-04-25 NOTE — ASU PREOP CHECKLIST - VIA
SOB HPI





- General


Chief Complaint: Shortness of Breath


Stated Complaint: TIARRA


Time Seen by Provider: 09/26/19 11:23


Source: patient, RN notes reviewed, old records reviewed


Mode of arrival: ambulatory


Limitations: no limitations





- History of Present Illness


Initial Comments: 





This is significantly sick 52-year-old female coming in for evaluation, patient 

is transferred from recently [facility] facility for significant shortness of 

breath and hypoxia.  Patient does have recent hospital admission, history of 

throat cancer multiple electrode abnormalities severe COPD with history of 

hypoxia.  Patient denying any pain no chest pain she said better on oxygen here 

in the ER.  Per staff patient's oxygen was 80% at home patient is mildly poor 

historian secondary severe will illness, weakness and mildly lethargic.  History

obtained from EMS as well as patient's record


MD Complaint: shortness of breath, cough


-: unknown


Severity: severe


Quality: other (No pain)


Consistency: constant


Improves With: oxygen, rest, bronchodilators


Worsens With: exertion


Known History Of: COPD, recurrent pneumonia


Context: recent URI


Associated Symptoms: cough, sputum production


Treatments Prior to Arrival: none





- Related Data


                                Home Medications











 Medication  Instructions  Recorded  Confirmed


 


LORazepam [Ativan] 0.5 mg PO Q8H PRN 09/26/19 09/26/19











                                    Allergies











Allergy/AdvReac Type Severity Reaction Status Date / Time


 


Beef Containing Products AdvReac  No reaction Verified 09/26/19 11:40





[Beef]     


 


Fish Containing Products AdvReac  No reaction Verified 09/26/19 11:40





[Fish]     


 


Pork/Porcine Containing AdvReac  No reaction Verified 09/26/19 11:40





Products     





[Pork]     














Review of Systems


ROS Statement: 


Those systems with pertinent positive or pertinent negative responses have been 

documented in the HPI.





ROS Other: All systems not noted in ROS Statement are negative.





Past Medical History


Past Medical History: Cancer


Additional Past Medical History / Comment(s): Laryngeal cancer: radiation and 

chemo completed in 1/2019; ectopic pregnancy


History of Any Multi-Drug Resistant Organisms: None Reported


Past Surgical History: Tubal Ligation


Past Anesthesia/Blood Transfusion Reactions: No Reported Reaction


Past Psychological History: No Psychological Hx Reported


Smoking Status: Former smoker


Past Alcohol Use History: None Reported


Past Drug Use History: None Reported





- Past Family History


  ** Mother


Family Medical History: Coronary Artery Disease (CAD), Diabetes Mellitus





  ** Father


Family Medical History: CVA/TIA, Myocardial Infarction (MI)


Additional Family Medical History / Comment(s): Emphsema, passed due to MI





General Exam


Limitations: no limitations


General appearance: alert, lethargic, in distress, cachectic


Head exam: Present: atraumatic, normocephalic, normal inspection


Eye exam: Present: normal appearance, EOMI.  Absent: scleral icterus, 

conjunctival injection, periorbital swelling


ENT exam: Present: normal exam, mucous membranes moist


Neck exam: Present: normal inspection.  Absent: tenderness, meningismus, lymph

adenopathy


Respiratory exam: Present: respiratory distress, wheezes, accessory muscle use, 

decreased breath sounds, prolonged expiratory.  Absent: rales, rhonchi, stridor


Cardiovascular Exam: Present: normal rhythm, tachycardia, normal heart sounds.  

Absent: systolic murmur, diastolic murmur, rubs, gallop, clicks


GI/Abdominal exam: Present: soft, normal bowel sounds.  Absent: distended, 

tenderness, guarding, rebound, rigid


Extremities exam: Present: normal inspection, full ROM, normal capillary refill.

  Absent: tenderness, pedal edema, joint swelling, calf tenderness


Back exam: Present: normal inspection


Neurological exam: Present: alert, oriented X3, CN II-XII intact


Psychiatric exam: Present: normal affect, normal mood


Skin exam: Present: warm, dry, intact, normal color.  Absent: rash





Course


                                   Vital Signs











  09/26/19 09/26/19 09/26/19





  11:20 11:52 12:00


 


Temperature 97.3 F L  


 


Pulse Rate 113 H 113 H 114 H


 


Respiratory 24 51 H 21





Rate   


 


Blood Pressure  109/81 116/83


 


O2 Sat by Pulse 94 L 97 95





Oximetry   














  09/26/19 09/26/19 09/26/19





  12:08 12:10 12:20


 


Temperature   


 


Pulse Rate  114 H 114 H


 


Respiratory 16 36 H 28 H





Rate   


 


Blood Pressure  115/87 115/87


 


O2 Sat by Pulse  94 L 93 L





Oximetry   














  09/26/19 09/26/19 09/26/19





  12:30 12:40 12:50


 


Temperature   


 


Pulse Rate 115 H 114 H 114 H


 


Respiratory 41 H 55 H 50 H





Rate   


 


Blood Pressure 115/87 108/85 108/85


 


O2 Sat by Pulse 94 L 93 L 91 L





Oximetry   














  09/26/19 09/26/19 09/26/19





  12:53 13:00 13:06


 


Temperature   


 


Pulse Rate 113 H 113 H 114 H


 


Respiratory  38 H 





Rate   


 


Blood Pressure  108/81 


 


O2 Sat by Pulse  93 L 





Oximetry   














  09/26/19 09/26/19 09/26/19





  13:10 13:20 13:30


 


Temperature   


 


Pulse Rate 101 H 116 H 117 H


 


Respiratory 51 H 52 H 29 H





Rate   


 


Blood Pressure 108/81 108/81 108/81


 


O2 Sat by Pulse 96 98 96





Oximetry   














  09/26/19 09/26/19 09/26/19





  13:40 13:50 14:00


 


Temperature   


 


Pulse Rate 117 H  


 


Respiratory 43 H 44 H 46 H





Rate   


 


Blood Pressure 108/79 108/79 104/76


 


O2 Sat by Pulse 96 97 95





Oximetry   














  09/26/19 09/26/19 09/26/19





  14:10 14:20 14:30


 


Temperature   


 


Pulse Rate 117 H  118 H


 


Respiratory 40 H  57 H





Rate   


 


Blood Pressure 104/76 104/76 104/76


 


O2 Sat by Pulse 91 L  95





Oximetry   














  09/26/19 09/26/19 09/26/19





  14:40 14:50 15:00


 


Temperature   


 


Pulse Rate 115 H 114 H 118 H


 


Respiratory 52 H 55 H 37 H





Rate   


 


Blood Pressure 107/79 107/79 107/79


 


O2 Sat by Pulse 93 L 94 L 91 L





Oximetry   














  09/26/19





  15:10


 


Temperature 


 


Pulse Rate 112 H


 


Respiratory 53 H





Rate 


 


Blood Pressure 97/79


 


O2 Sat by Pulse 88 L





Oximetry 














- Reevaluation(s)


Reevaluation #1: 





09/26/19 12:27


Medical record is reviewed


09/26/19 12:27


she was just discharged from our ICU yesterday


Reevaluation #2: 





09/26/19 15:23


Attempts to take patient off of nonrebreather oxygen dropped to the low 80s, 

patient feels very weak is very anxious to





Medical Decision Making





- Medical Decision Making





52 female the ER for evaluation presented today for evaluation regarding 

shortness of breath hypoxia recurrent hypoxia, patient attempted to wean off 

oxygen therapy here in the ER which failed.  Patient be admitted for continued 

monitoring and cardiopulmonary resuscitation





- Lab Data


Result diagrams: 


                                 09/26/19 12:40





                                 09/26/19 12:40


                                   Lab Results











  09/26/19 09/26/19 09/26/19 Range/Units





  12:40 12:40 12:40 


 


WBC  14.0 H    (3.8-10.6)  k/uL


 


RBC  3.57 L    (3.80-5.40)  m/uL


 


Hgb  11.0 L    (11.4-16.0)  gm/dL


 


Hct  35.1    (34.0-46.0)  %


 


MCV  98.4    (80.0-100.0)  fL


 


MCH  31.0    (25.0-35.0)  pg


 


MCHC  31.5    (31.0-37.0)  g/dL


 


RDW  14.3    (11.5-15.5)  %


 


Plt Count  529 H    (150-450)  k/uL


 


Neutrophils %  97    %


 


Lymphocytes %  1    %


 


Monocytes %  2    %


 


Eosinophils %  0    %


 


Basophils %  0    %


 


Neutrophils #  13.5 H    (1.3-7.7)  k/uL


 


Lymphocytes #  0.1 L    (1.0-4.8)  k/uL


 


Monocytes #  0.3    (0-1.0)  k/uL


 


Eosinophils #  0.0    (0-0.7)  k/uL


 


Basophils #  0.0    (0-0.2)  k/uL


 


PT     (9.0-12.0)  sec


 


INR     (<1.2)  


 


APTT     (22.0-30.0)  sec


 


Sodium   140   (137-145)  mmol/L


 


Potassium   4.0   (3.5-5.1)  mmol/L


 


Chloride   98   ()  mmol/L


 


Carbon Dioxide   37 H   (22-30)  mmol/L


 


Anion Gap   5   mmol/L


 


BUN   21 H   (7-17)  mg/dL


 


Creatinine   0.24 L   (0.52-1.04)  mg/dL


 


Est GFR (CKD-EPI)AfAm   >90   (>60 ml/min/1.73 sqM)  


 


Est GFR (CKD-EPI)NonAf   >90   (>60 ml/min/1.73 sqM)  


 


Glucose   89   (74-99)  mg/dL


 


Plasma Lactic Acid German    1.6  (0.7-2.0)  mmol/L


 


Calcium   9.7   (8.4-10.2)  mg/dL


 


Magnesium   2.0   (1.6-2.3)  mg/dL


 


Total Bilirubin   1.0   (0.2-1.3)  mg/dL


 


AST   19   (14-36)  U/L


 


ALT   34   (9-52)  U/L


 


Alkaline Phosphatase   127 H   ()  U/L


 


Troponin I     (0.000-0.034)  ng/mL


 


NT-Pro-B Natriuret Pep     pg/mL


 


Total Protein   6.7   (6.3-8.2)  g/dL


 


Albumin   3.3 L   (3.5-5.0)  g/dL














  09/26/19 09/26/19 09/26/19 Range/Units





  12:40 12:40 12:40 


 


WBC     (3.8-10.6)  k/uL


 


RBC     (3.80-5.40)  m/uL


 


Hgb     (11.4-16.0)  gm/dL


 


Hct     (34.0-46.0)  %


 


MCV     (80.0-100.0)  fL


 


MCH     (25.0-35.0)  pg


 


MCHC     (31.0-37.0)  g/dL


 


RDW     (11.5-15.5)  %


 


Plt Count     (150-450)  k/uL


 


Neutrophils %     %


 


Lymphocytes %     %


 


Monocytes %     %


 


Eosinophils %     %


 


Basophils %     %


 


Neutrophils #     (1.3-7.7)  k/uL


 


Lymphocytes #     (1.0-4.8)  k/uL


 


Monocytes #     (0-1.0)  k/uL


 


Eosinophils #     (0-0.7)  k/uL


 


Basophils #     (0-0.2)  k/uL


 


PT   11.7   (9.0-12.0)  sec


 


INR   1.1   (<1.2)  


 


APTT   26.7   (22.0-30.0)  sec


 


Sodium     (137-145)  mmol/L


 


Potassium     (3.5-5.1)  mmol/L


 


Chloride     ()  mmol/L


 


Carbon Dioxide     (22-30)  mmol/L


 


Anion Gap     mmol/L


 


BUN     (7-17)  mg/dL


 


Creatinine     (0.52-1.04)  mg/dL


 


Est GFR (CKD-EPI)AfAm     (>60 ml/min/1.73 sqM)  


 


Est GFR (CKD-EPI)NonAf     (>60 ml/min/1.73 sqM)  


 


Glucose     (74-99)  mg/dL


 


Plasma Lactic Acid German     (0.7-2.0)  mmol/L


 


Calcium     (8.4-10.2)  mg/dL


 


Magnesium     (1.6-2.3)  mg/dL


 


Total Bilirubin     (0.2-1.3)  mg/dL


 


AST     (14-36)  U/L


 


ALT     (9-52)  U/L


 


Alkaline Phosphatase     ()  U/L


 


Troponin I    <0.012  (0.000-0.034)  ng/mL


 


NT-Pro-B Natriuret Pep  101    pg/mL


 


Total Protein     (6.3-8.2)  g/dL


 


Albumin     (3.5-5.0)  g/dL














- Radiology Data


Radiology results: report reviewed (Chest x-ray is unchanged from prior), image 

reviewed





Critical Care Time


Critical Care Time: Yes


Total Critical Care Time: 31





Disposition


Clinical Impression: 


 Weakness, COPD with hypoxia, Acute respiratory failure with hypoxia and 

hypercapnia, Mediastinal lymphadenopathy, Severe protein-energy malnutrition





Disposition: ADMITTED AS IP TO THIS HOSP


Condition: Fair


Is patient prescribed a controlled substance at d/c from ED?: No


Referrals: 


Houston Jerome MD [Primary Care Provider] - 1-2 days
ambulate

## 2024-04-30 DIAGNOSIS — E78.5 HYPERLIPIDEMIA, UNSPECIFIED: ICD-10-CM

## 2024-04-30 DIAGNOSIS — Z88.0 ALLERGY STATUS TO PENICILLIN: ICD-10-CM

## 2024-04-30 DIAGNOSIS — M65.332 TRIGGER FINGER, LEFT MIDDLE FINGER: ICD-10-CM

## 2024-04-30 DIAGNOSIS — I10 ESSENTIAL (PRIMARY) HYPERTENSION: ICD-10-CM

## 2024-04-30 DIAGNOSIS — Z79.82 LONG TERM (CURRENT) USE OF ASPIRIN: ICD-10-CM

## 2024-04-30 DIAGNOSIS — Z95.1 PRESENCE OF AORTOCORONARY BYPASS GRAFT: ICD-10-CM

## 2024-04-30 DIAGNOSIS — E03.9 HYPOTHYROIDISM, UNSPECIFIED: ICD-10-CM

## 2024-04-30 DIAGNOSIS — G47.33 OBSTRUCTIVE SLEEP APNEA (ADULT) (PEDIATRIC): ICD-10-CM

## 2024-05-01 ENCOUNTER — OUTPATIENT (OUTPATIENT)
Dept: OUTPATIENT SERVICES | Facility: HOSPITAL | Age: 84
LOS: 1 days | End: 2024-05-01
Payer: COMMERCIAL

## 2024-05-01 DIAGNOSIS — I65.29 OCCLUSION AND STENOSIS OF UNSPECIFIED CAROTID ARTERY: ICD-10-CM

## 2024-05-01 DIAGNOSIS — Z90.49 ACQUIRED ABSENCE OF OTHER SPECIFIED PARTS OF DIGESTIVE TRACT: Chronic | ICD-10-CM

## 2024-05-01 DIAGNOSIS — Z98.890 OTHER SPECIFIED POSTPROCEDURAL STATES: Chronic | ICD-10-CM

## 2024-05-01 DIAGNOSIS — Z00.8 ENCOUNTER FOR OTHER GENERAL EXAMINATION: ICD-10-CM

## 2024-05-01 DIAGNOSIS — E89.0 POSTPROCEDURAL HYPOTHYROIDISM: Chronic | ICD-10-CM

## 2024-05-01 DIAGNOSIS — Z96.641 PRESENCE OF RIGHT ARTIFICIAL HIP JOINT: Chronic | ICD-10-CM

## 2024-05-01 DIAGNOSIS — I25.810 ATHEROSCLEROSIS OF CORONARY ARTERY BYPASS GRAFT(S) WITHOUT ANGINA PECTORIS: Chronic | ICD-10-CM

## 2024-05-01 PROCEDURE — A9500: CPT

## 2024-05-01 PROCEDURE — 78452 HT MUSCLE IMAGE SPECT MULT: CPT

## 2024-05-01 PROCEDURE — 93018 CV STRESS TEST I&R ONLY: CPT

## 2024-05-01 PROCEDURE — 78452 HT MUSCLE IMAGE SPECT MULT: CPT | Mod: 26

## 2024-05-02 DIAGNOSIS — I65.29 OCCLUSION AND STENOSIS OF UNSPECIFIED CAROTID ARTERY: ICD-10-CM

## 2024-05-06 ENCOUNTER — LABORATORY RESULT (OUTPATIENT)
Age: 84
End: 2024-05-06

## 2024-05-06 LAB
HCT VFR BLD CALC: 39.6 %
HGB BLD-MCNC: 12.7 G/DL
INR PPP: 0.9 RATIO
MCHC RBC-ENTMCNC: 29.9 PG
MCHC RBC-ENTMCNC: 32.1 G/DL
MCV RBC AUTO: 93.2 FL
PLATELET # BLD AUTO: 285 K/UL
PMV BLD AUTO: 0 /100 WBCS
PMV BLD: 10.4 FL
PT BLD: 10.3 SEC
RBC # BLD: 4.25 M/UL
RBC # FLD: 13.7 %
WBC # FLD AUTO: 7.46 K/UL

## 2024-05-07 LAB
ALBUMIN SERPL ELPH-MCNC: 4.2 G/DL
ALP BLD-CCNC: 80 U/L
ALT SERPL-CCNC: 19 U/L
ANION GAP SERPL CALC-SCNC: 12 MMOL/L
AST SERPL-CCNC: 21 U/L
BILIRUB SERPL-MCNC: 0.4 MG/DL
BUN SERPL-MCNC: 29 MG/DL
CALCIUM SERPL-MCNC: 9.5 MG/DL
CHLORIDE SERPL-SCNC: 101 MMOL/L
CHOLEST SERPL-MCNC: 326 MG/DL
CO2 SERPL-SCNC: 25 MMOL/L
CREAT SERPL-MCNC: 1.3 MG/DL
EGFR: 40 ML/MIN/1.73M2
ESTIMATED AVERAGE GLUCOSE: 137 MG/DL
GLUCOSE SERPL-MCNC: 101 MG/DL
HBA1C MFR BLD HPLC: 6.4 %
HDLC SERPL-MCNC: 50 MG/DL
LDLC SERPL CALC-MCNC: ABNORMAL
NONHDLC SERPL-MCNC: 276 MG/DL
POTASSIUM SERPL-SCNC: 4.5 MMOL/L
PROT SERPL-MCNC: 7 G/DL
SODIUM SERPL-SCNC: 138 MMOL/L
TRIGL SERPL-MCNC: 488 MG/DL
TSH SERPL-ACNC: 1.55 UIU/ML

## 2024-05-08 NOTE — PATIENT PROFILE ADULT - NS PRO AD PATIENT TYPE
Post Pacemaker Discharge Instructions:  DO NOT remove the flexan dressing that is covering the pacemaker incision until instructed by  your physician in your follow up appointment (usually around 10 days).  Inspect the site daily for tenderness, discharge, or signs of infection.  Keep dressing dry and intact  Do not apply powders or lotions to incision site until healed  Avoid stress to the incision/suture site. Avoid any kind of trauma to the pacemaker site.  Check your pulse daily and notify your physician if the rate is less than the pacemaker set rate.   Always carry your pacemaker ID card with you. A permanent card will be mailed to you in 6-8 weeks.   Avoid areas of high voltages such as power plants, radio transmitters, or welding equipment. You may walk through anti-theft doorways, but do not stand near them for any length of time.   Inform all Physicians and Dentists that you have a pacemaker.   Your pacemaker will need to be checked for proper functioning at intervals determined by your physician. It is very important to keep these appointments or your pacemaker may not function properly.     Activity:  Limit your activity for the next 48 hours. Avoid excessive bending or squatting.   You may use your arms but avoid reaching overhead for 6 weeks with the arm on the same side as the pacemaker.  Do not lift anything grater than 5 pounds for one month with the arm on the same side as your pacemaker.  No driving until instructed by you physician at your follow up appointment.     CALL YOUR DOCTOR IMMEDIATELY IF YOU EXPERIENCE ANY OF THE FOLLOWING:  Chest pain, palpitations, shortness of breath, excessive dizziness, fainting, nausea or vomiting.   Signs of infection including: swelling, discharge, redness, warmth , pain, fever    Health Care Proxy (HCP)

## 2024-05-09 ENCOUNTER — APPOINTMENT (OUTPATIENT)
Dept: ORTHOPEDIC SURGERY | Facility: CLINIC | Age: 84
End: 2024-05-09
Payer: COMMERCIAL

## 2024-05-09 DIAGNOSIS — M65.332 TRIGGER FINGER, LEFT MIDDLE FINGER: ICD-10-CM

## 2024-05-09 PROCEDURE — 99024 POSTOP FOLLOW-UP VISIT: CPT

## 2024-05-09 NOTE — DISCUSSION/SUMMARY
[de-identified] : The patient's sutures were removed in the office today.  The surgical incision site is clean dry and intact and without any signs of infection. We discussed scar massage.  Encouraged gentle range of motion of the wrist and fingers.  Encouraged patient to work on making a full fist. Advised to refrain from heavy lifting/pulling/pushing until repeat evaluation. Patient understands that some residual pain, swelling, and numbness/tingling following surgery is normal and expected.  Patient understands that any numbness/tingling experienced after 6 months is likely permanent. All questions and concerns addressed to patient's satisfaction. Patient expresses full understanding of treatment plan.

## 2024-05-09 NOTE — PHYSICAL EXAM
[de-identified] : Physical examination of the left hand third digit: Mild hand swelling Sutures removed Healed incision No evidence of infection.  No erythema appreciated.  No active bleeding or discharge.  No maceration. Mild tenderness at the surgical site Good finger ROM Sensorimotor intact distally.  Neuro vas intact.

## 2024-05-09 NOTE — HISTORY OF PRESENT ILLNESS
[de-identified] : 84-year-old female for first postop valuation status post left hand third digit trigger release.  She is doing well overall with no significant plaints at this time.  Denies any numbness or tingling.  Denies active bleeding or discharge.  Denies any signs of infection.

## 2024-05-10 VITALS
OXYGEN SATURATION: 95 % | SYSTOLIC BLOOD PRESSURE: 131 MMHG | HEART RATE: 57 BPM | WEIGHT: 171.08 LBS | RESPIRATION RATE: 16 BRPM | DIASTOLIC BLOOD PRESSURE: 64 MMHG

## 2024-05-10 NOTE — H&P CARDIOLOGY - HISTORY OF PRESENT ILLNESS
83 y/o F with PMHx of HTN, HLD, hypothyroidism (h/o thyroidectomy), DEXTER on CPAP, h/o b/l CEA, CAD, s/p AVR (porcine) in 2011 and 3V CABG in 2011 (LIMA-LAD, SVG-OM, SVG-PDA), who presented to her cardiologist with c/o left sided chest discomfort........................... Denies any SOB, dizziness, n/v, diaphoresis, orthopnea, PND, LE edema, palpitations, syncope.   She underwent a NST on 5/1/24 which revealed reversible large sized perfusion defect involving the basal to mid anterolateral, basal to mid inferolateral and apical lateral walls of the left ventricle, EF >55%.    Pt is now referred for LHC with possible intervention if clinically indicated.     Pre cath note:    indication:  [ ] STEMI                [ ] NSTEMI                 [ ] Acute coronary syndrome                     [ ]Unstable Angina   [ ] high risk  [ ] intermediate risk  [ ] low risk                     [x ] Stable Angina     non-invasive testing:   NST         Date:   5/1/24     result: [ ] high risk  [x ] intermediate risk  [ ] low risk    Anti- Anginal medications:                    [ ] not used                       [x ] used                   [ ] not used but strong indication not to use  -on BB    Ejection Fraction                   [ ] <29            [ ] 30-39%   [ ] 40-49%     [x ]>50%    CHF                   [ ] active (within last 14 days on meds   [ ] Chronic (on meds but no exacerbation)    COPD                   [ ] mild (on chronic bronchodilators)  [ ] moderate (on chronic steroid therapy)      [ ] severe (indication for home O2 or PACO2 >50)    Other risk factors:                       [ ] Previous MI                     [ ] CVA/ stroke                    [x ] carotid stent/ CEA                    [ ] PVD/PAD- (arterial aneurysm, non-palpable pulses, tortuous vessel with inability to insert catheter, infra-renal dissection, renal or subclavian artery stenosis)                    [ ] diabetic                    [x ] previous CABG                    [ ] Renal Failure       Right Seymour Test:    Adjusted Cath Bleeding Risk:     Pre-hydration:   85 y/o F with PMHx of HTN, HLD, hypothyroidism (h/o thyroidectomy), DEXTER on CPAP, h/o b/l CEA, CAD, s/p AVR (porcine) in 2011 and 3V CABG in 2011 (LIMA-LAD, SVG-OM, SVG-PDA), who presented to her cardiologist with c/o left sided chest discomfort and CABRAL on minimal activity, relieved with rest.  Denies any dizziness, n/v, diaphoresis, orthopnea, PND, LE edema, palpitations, syncope.  She underwent a NST on 5/1/24 which revealed reversible large sized perfusion defect involving the basal to mid anterolateral, basal to mid inferolateral and apical lateral walls of the left ventricle, EF >55%.    Pt is now referred for C with possible intervention if clinically indicated.     Pre cath note:    indication:  [ ] STEMI                [ ] NSTEMI                 [ ] Acute coronary syndrome                     [ ]Unstable Angina   [ ] high risk  [ ] intermediate risk  [ ] low risk                     [x ] Stable Angina     non-invasive testing:   NST         Date:   5/1/24     result: [ ] high risk  [x ] intermediate risk  [ ] low risk    Anti- Anginal medications:                    [ ] not used                       [x ] used                   [ ] not used but strong indication not to use  -on BB  -Amlodipine 2.5mg po x 1    Ejection Fraction                   [ ] <29            [ ] 30-39%   [ ] 40-49%     [x ]>50%    CHF                   [ ] active (within last 14 days on meds   [ ] Chronic (on meds but no exacerbation)    COPD                   [ ] mild (on chronic bronchodilators)  [ ] moderate (on chronic steroid therapy)      [ ] severe (indication for home O2 or PACO2 >50)    Other risk factors:                       [ ] Previous MI                     [ ] CVA/ stroke                    [x ] carotid stent/ CEA                    [ ] PVD/PAD- (arterial aneurysm, non-palpable pulses, tortuous vessel with inability to insert catheter, infra-renal dissection, renal or subclavian artery stenosis)                    [ ] diabetic                    [x ] previous CABG                    [ ] Renal Failure       Right Seymour Test: positive    Adjusted Cath Bleeding Risk: 3.1%    Pre-hydration: NS 250cc bolus

## 2024-05-10 NOTE — H&P CARDIOLOGY - COMMENTS
83 y/o F with PMHx of HTN, HLD, hypothyroidism (h/o thyroidectomy), DEXTER on CPAP, h/o b/l CEA, CAD, s/p AVR (porcine) in 2011 and 3V CABG in 2011 (LIMA-LAD, SVG-OM, SVG-PDA), who presented to her cardiologist with c/o left sided chest discomfort, with subsequent abnormal NST on 5/1/24 which revealed reversible large sized perfusion defect involving the basal to mid anterolateral, basal to mid inferolateral and apical lateral walls of the left ventricle, EF >55%.  Pt is now referred for LHC with possible intervention if clinically indicated. 85 y/o F with PMHx of HTN, HLD, hypothyroidism (h/o thyroidectomy), DEXTER on CPAP, h/o b/l CEA, CAD, s/p AVR (porcine) in 2011 and 3V CABG in 2011 (LIMA-LAD, SVG-OM, SVG-PDA), who presented to her cardiologist with c/o left sided chest discomfort and CABRAL, with subsequent abnormal NST on 5/1/24 which revealed reversible large sized perfusion defect involving the basal to mid anterolateral, basal to mid inferolateral and apical lateral walls of the left ventricle, EF >55%.  Pt is now referred for LHC with possible intervention if clinically indicated.

## 2024-05-13 ENCOUNTER — OUTPATIENT (OUTPATIENT)
Dept: OUTPATIENT SERVICES | Facility: HOSPITAL | Age: 84
LOS: 1 days | Discharge: HOME | End: 2024-05-13
Payer: COMMERCIAL

## 2024-05-13 ENCOUNTER — TRANSCRIPTION ENCOUNTER (OUTPATIENT)
Age: 84
End: 2024-05-13

## 2024-05-13 VITALS
RESPIRATION RATE: 14 BRPM | DIASTOLIC BLOOD PRESSURE: 55 MMHG | SYSTOLIC BLOOD PRESSURE: 103 MMHG | HEART RATE: 65 BPM | OXYGEN SATURATION: 99 %

## 2024-05-13 DIAGNOSIS — Z90.49 ACQUIRED ABSENCE OF OTHER SPECIFIED PARTS OF DIGESTIVE TRACT: Chronic | ICD-10-CM

## 2024-05-13 DIAGNOSIS — E89.0 POSTPROCEDURAL HYPOTHYROIDISM: Chronic | ICD-10-CM

## 2024-05-13 DIAGNOSIS — Z96.641 PRESENCE OF RIGHT ARTIFICIAL HIP JOINT: Chronic | ICD-10-CM

## 2024-05-13 DIAGNOSIS — Z98.890 OTHER SPECIFIED POSTPROCEDURAL STATES: Chronic | ICD-10-CM

## 2024-05-13 DIAGNOSIS — I25.810 ATHEROSCLEROSIS OF CORONARY ARTERY BYPASS GRAFT(S) WITHOUT ANGINA PECTORIS: Chronic | ICD-10-CM

## 2024-05-13 DIAGNOSIS — I25.10 ATHEROSCLEROTIC HEART DISEASE OF NATIVE CORONARY ARTERY WITHOUT ANGINA PECTORIS: ICD-10-CM

## 2024-05-13 LAB
ANION GAP SERPL CALC-SCNC: 11 MMOL/L — SIGNIFICANT CHANGE UP (ref 7–14)
BUN SERPL-MCNC: 34 MG/DL — HIGH (ref 10–20)
CALCIUM SERPL-MCNC: 10 MG/DL — SIGNIFICANT CHANGE UP (ref 8.4–10.5)
CHLORIDE SERPL-SCNC: 100 MMOL/L — SIGNIFICANT CHANGE UP (ref 98–110)
CO2 SERPL-SCNC: 28 MMOL/L — SIGNIFICANT CHANGE UP (ref 17–32)
CREAT SERPL-MCNC: 1.3 MG/DL — SIGNIFICANT CHANGE UP (ref 0.7–1.5)
EGFR: 40 ML/MIN/1.73M2 — LOW
GLUCOSE SERPL-MCNC: 100 MG/DL — HIGH (ref 70–99)
HCT VFR BLD CALC: 36.6 % — LOW (ref 37–47)
HGB BLD-MCNC: 12.4 G/DL — SIGNIFICANT CHANGE UP (ref 12–16)
MCHC RBC-ENTMCNC: 31.2 PG — HIGH (ref 27–31)
MCHC RBC-ENTMCNC: 33.9 G/DL — SIGNIFICANT CHANGE UP (ref 32–37)
MCV RBC AUTO: 92.2 FL — SIGNIFICANT CHANGE UP (ref 81–99)
NRBC # BLD: 0 /100 WBCS — SIGNIFICANT CHANGE UP (ref 0–0)
PLATELET # BLD AUTO: 257 K/UL — SIGNIFICANT CHANGE UP (ref 130–400)
PMV BLD: 10 FL — SIGNIFICANT CHANGE UP (ref 7.4–10.4)
POTASSIUM SERPL-MCNC: 5.2 MMOL/L — HIGH (ref 3.5–5)
POTASSIUM SERPL-SCNC: 5.2 MMOL/L — HIGH (ref 3.5–5)
RBC # BLD: 3.97 M/UL — LOW (ref 4.2–5.4)
RBC # FLD: 13.2 % — SIGNIFICANT CHANGE UP (ref 11.5–14.5)
SODIUM SERPL-SCNC: 139 MMOL/L — SIGNIFICANT CHANGE UP (ref 135–146)
WBC # BLD: 8.75 K/UL — SIGNIFICANT CHANGE UP (ref 4.8–10.8)
WBC # FLD AUTO: 8.75 K/UL — SIGNIFICANT CHANGE UP (ref 4.8–10.8)

## 2024-05-13 PROCEDURE — C1887: CPT

## 2024-05-13 PROCEDURE — 85027 COMPLETE CBC AUTOMATED: CPT

## 2024-05-13 PROCEDURE — 80048 BASIC METABOLIC PNL TOTAL CA: CPT

## 2024-05-13 PROCEDURE — 93455 CORONARY ART/GRFT ANGIO S&I: CPT

## 2024-05-13 PROCEDURE — C1769: CPT

## 2024-05-13 PROCEDURE — 93454 CORONARY ARTERY ANGIO S&I: CPT | Mod: 26

## 2024-05-13 PROCEDURE — 36415 COLL VENOUS BLD VENIPUNCTURE: CPT

## 2024-05-13 PROCEDURE — C1894: CPT

## 2024-05-13 RX ORDER — LEVOTHYROXINE SODIUM 125 MCG
1 TABLET ORAL
Refills: 0 | DISCHARGE

## 2024-05-13 RX ORDER — SODIUM CHLORIDE 9 MG/ML
250 INJECTION INTRAMUSCULAR; INTRAVENOUS; SUBCUTANEOUS ONCE
Refills: 0 | Status: COMPLETED | OUTPATIENT
Start: 2024-05-13 | End: 2024-05-13

## 2024-05-13 RX ORDER — CHLORHEXIDINE GLUCONATE 213 G/1000ML
1 SOLUTION TOPICAL ONCE
Refills: 0 | Status: DISCONTINUED | OUTPATIENT
Start: 2024-05-13 | End: 2024-05-13

## 2024-05-13 RX ORDER — HYDRALAZINE HCL 50 MG
10 TABLET ORAL ONCE
Refills: 0 | Status: COMPLETED | OUTPATIENT
Start: 2024-05-13 | End: 2024-05-13

## 2024-05-13 RX ORDER — ACETAMINOPHEN 500 MG
650 TABLET ORAL ONCE
Refills: 0 | Status: COMPLETED | OUTPATIENT
Start: 2024-05-13 | End: 2024-05-13

## 2024-05-13 RX ORDER — EZETIMIBE 10 MG/1
1 TABLET ORAL
Refills: 0 | DISCHARGE

## 2024-05-13 RX ORDER — LATANOPROST 0.05 MG/ML
1 SOLUTION/ DROPS OPHTHALMIC; TOPICAL
Qty: 0 | Refills: 0 | DISCHARGE

## 2024-05-13 RX ORDER — AMLODIPINE BESYLATE 2.5 MG/1
2.5 TABLET ORAL ONCE
Refills: 0 | Status: COMPLETED | OUTPATIENT
Start: 2024-05-13 | End: 2024-05-13

## 2024-05-13 RX ORDER — METOPROLOL TARTRATE 50 MG
1 TABLET ORAL
Qty: 0 | Refills: 0 | DISCHARGE

## 2024-05-13 RX ORDER — BRIMONIDINE TARTRATE 2 MG/MG
1 SOLUTION/ DROPS OPHTHALMIC
Qty: 0 | Refills: 0 | DISCHARGE

## 2024-05-13 RX ORDER — SODIUM CHLORIDE 9 MG/ML
1000 INJECTION INTRAMUSCULAR; INTRAVENOUS; SUBCUTANEOUS
Refills: 0 | Status: DISCONTINUED | OUTPATIENT
Start: 2024-05-13 | End: 2024-05-13

## 2024-05-13 RX ADMIN — SODIUM CHLORIDE 150 MILLILITER(S): 9 INJECTION INTRAMUSCULAR; INTRAVENOUS; SUBCUTANEOUS at 16:00

## 2024-05-13 RX ADMIN — AMLODIPINE BESYLATE 2.5 MILLIGRAM(S): 2.5 TABLET ORAL at 14:08

## 2024-05-13 RX ADMIN — SODIUM CHLORIDE 250 MILLILITER(S): 9 INJECTION INTRAMUSCULAR; INTRAVENOUS; SUBCUTANEOUS at 14:08

## 2024-05-13 RX ADMIN — Medication 650 MILLIGRAM(S): at 17:10

## 2024-05-13 RX ADMIN — Medication 10 MILLIGRAM(S): at 16:55

## 2024-05-13 NOTE — ASU DISCHARGE PLAN (ADULT/PEDIATRIC) - CARE PROVIDER_API CALL
Elvia Guerrero  Cardiovascular Disease  [address] [place] [address], [facility] [address]  [place] [address]  Phone: [phone]  Fax: [phone]  Follow Up Time: 2 weeks

## 2024-05-13 NOTE — ASU DISCHARGE PLAN (ADULT/PEDIATRIC) - ASU DC SPECIAL INSTRUCTIONSFT
Discharge Instructions as follows:  - Continue medical regimen as prescribed to prevent chest pain.  - Continue baby ASA, beta blocker, Zetia  - Instructed to call 911 if chest pain, shortness of breath or bleeding from access site.  - No heavy lifting > 10lbs x 1 week.  - No driving x 24 hours.  - No baths, swimming pools x 1 week, may shower  - Low sodium low fat low cholesterol diet  - Follow-up with Cardiologist in 1-2 weeks after discharge  - Soreness or tenderness at the site is possible, it will diminish over time. You may take Tylenol every 4-6 hours as needed. Nothing stronger is needed. NO Motrin/Ibuprofen  - Any questions call cardiac cath lab [phone]

## 2024-05-13 NOTE — CHART NOTE - NSCHARTNOTEFT_GEN_A_CORE
PRE-OP DIAGNOSIS:    Abnormal stress test       PROCEDURE:   [x] Coronary Angiogram            PHYSICIAN:    Dr. Levy    ASSISTANT:    Dr. Adler      PROCEDURE DESCRIPTION:     Consent:      [x] Patient     [] Family Member     []  Used        Anesthesia:     [] General     [x] Sedation     [x] Local        Access & Closure:     [x]6 Fr left Radial Artery > TR Band    [x]6 Fr left Femoral Artery > manual    IV Contrast: 75mL        Intervention:   None    Implants:   None     FINDINGS:     Coronary Dominance:   Right    LM:   50% stenosis     LAD:   mild disease   distal vessel supplied by patent LIMA    CX:   severe disease     OM1  supplied by patent SVG    RCA:   ostial severe disease   supplied by patent SVG      EF:55 %        ESTIMATED BLOOD LOSS: < 10 mL        CONDITION:     [x] Good     [] Fair     [] Critical        SPECIMEN REMOVED: N/A       POST-OP DIAGNOSIS:    Known Native left main and Two vessel coronary artery disease   Patent LIMA to LAD   Patent SVG graft to OM1  Patent SVG graft to RPDA       PLAN OF CARE:   [x] D/C Home Today   [x] Medications:   cont with current home medications  [x] Post-procedure LVEDP-guided IV fluids as ordered   [x] Encourage oral fluids daily  [x] Follow up with cardiology as outpatient

## 2024-05-20 DIAGNOSIS — I25.10 ATHEROSCLEROTIC HEART DISEASE OF NATIVE CORONARY ARTERY WITHOUT ANGINA PECTORIS: ICD-10-CM

## 2024-05-20 DIAGNOSIS — R94.39 ABNORMAL RESULT OF OTHER CARDIOVASCULAR FUNCTION STUDY: ICD-10-CM

## 2024-05-23 ENCOUNTER — APPOINTMENT (OUTPATIENT)
Dept: CARDIOLOGY | Facility: CLINIC | Age: 84
End: 2024-05-23
Payer: COMMERCIAL

## 2024-05-23 VITALS
WEIGHT: 171 LBS | HEART RATE: 56 BPM | DIASTOLIC BLOOD PRESSURE: 64 MMHG | BODY MASS INDEX: 32.31 KG/M2 | SYSTOLIC BLOOD PRESSURE: 102 MMHG | OXYGEN SATURATION: 96 %

## 2024-05-23 DIAGNOSIS — I10 ESSENTIAL (PRIMARY) HYPERTENSION: ICD-10-CM

## 2024-05-23 DIAGNOSIS — I25.10 ATHEROSCLEROTIC HEART DISEASE OF NATIVE CORONARY ARTERY W/OUT ANGINA PECTORIS: ICD-10-CM

## 2024-05-23 DIAGNOSIS — Z95.2 PRESENCE OF PROSTHETIC HEART VALVE: ICD-10-CM

## 2024-05-23 PROCEDURE — 99214 OFFICE O/P EST MOD 30 MIN: CPT

## 2024-05-23 RX ORDER — EZETIMIBE 10 MG/1
10 TABLET ORAL
Qty: 90 | Refills: 3 | Status: ACTIVE | COMMUNITY
Start: 1900-01-01 | End: 1900-01-01

## 2024-05-23 RX ORDER — METOPROLOL SUCCINATE 25 MG/1
25 TABLET, EXTENDED RELEASE ORAL DAILY
Qty: 90 | Refills: 3 | Status: ACTIVE | COMMUNITY
Start: 1900-01-01 | End: 1900-01-01

## 2024-05-23 RX ORDER — VALSARTAN AND HYDROCHLOROTHIAZIDE 160; 12.5 MG/1; MG/1
160-12.5 TABLET, FILM COATED ORAL DAILY
Qty: 90 | Refills: 3 | Status: ACTIVE | COMMUNITY
Start: 2023-06-06 | End: 1900-01-01

## 2024-05-23 NOTE — ADDENDUM
[FreeTextEntry1] : RCRI = 1, 6% risk (30-day risk of death, MI, or cardiac arrest) Moderate-risk for perioperative MACE Antibiotic prophylaxis is required prior to procedures/surgery
Transferred

## 2024-05-23 NOTE — DISCUSSION/SUMMARY
[FreeTextEntry1] : CAD s/p 3v CABG / AVR  [facility] on 5/13/2024 - Patent grafts, no intervention  Results reviewed. TTE (4/2024):  EF >55%, AVR MG 12, RVSP 35 Carotid US (4/2024):  <50%   Continue aspirin and Zetia Continue Metoprolol succinate 25 mg daily Continue Valsartan-HCTZ 160-12.5 mg Follow-up 4-6 months

## 2024-05-23 NOTE — HISTORY OF PRESENT ILLNESS
[FreeTextEntry1] : Former patient of Dr. Fox.  CAD s/p 3v CABG / AVR (2011), HTN, HLD, DEXTER on CPAP Right THR on 5/26/2023  Left-sided chest discomfort/burning on/off exertion.  Symptoms started 2 months ago. Difficulty swallowing large bites of food and potatoes Traumatic extubation after surgery  Stopped taking Rosuvastatin due to myalgias Statin intolerant - tried 4 others in the past  Follow-up s/p LHC on 5/13/2024 - Patent grafts, no intervention  Results reviewed. TTE (4/2024):  EF >55%, AVR MG 12, RVSP 35 Carotid US (4/2024):  <50%

## 2024-07-03 ENCOUNTER — APPOINTMENT (OUTPATIENT)
Dept: PULMONOLOGY | Facility: CLINIC | Age: 84
End: 2024-07-03
Payer: COMMERCIAL

## 2024-07-03 VITALS
OXYGEN SATURATION: 97 % | WEIGHT: 170 LBS | HEART RATE: 64 BPM | DIASTOLIC BLOOD PRESSURE: 64 MMHG | HEIGHT: 61 IN | BODY MASS INDEX: 32.1 KG/M2 | SYSTOLIC BLOOD PRESSURE: 122 MMHG

## 2024-07-03 DIAGNOSIS — G47.30 SLEEP APNEA, UNSPECIFIED: ICD-10-CM

## 2024-07-03 PROCEDURE — G2211 COMPLEX E/M VISIT ADD ON: CPT

## 2024-07-03 PROCEDURE — 99213 OFFICE O/P EST LOW 20 MIN: CPT

## 2024-09-23 ENCOUNTER — APPOINTMENT (OUTPATIENT)
Dept: SURGERY | Facility: CLINIC | Age: 84
End: 2024-09-23
Payer: COMMERCIAL

## 2024-09-23 ENCOUNTER — NON-APPOINTMENT (OUTPATIENT)
Age: 84
End: 2024-09-23

## 2024-09-23 ENCOUNTER — LABORATORY RESULT (OUTPATIENT)
Age: 84
End: 2024-09-23

## 2024-09-23 VITALS
SYSTOLIC BLOOD PRESSURE: 124 MMHG | TEMPERATURE: 97.1 F | HEIGHT: 61 IN | WEIGHT: 170 LBS | HEART RATE: 64 BPM | BODY MASS INDEX: 32.1 KG/M2 | OXYGEN SATURATION: 95 % | DIASTOLIC BLOOD PRESSURE: 82 MMHG

## 2024-09-23 DIAGNOSIS — C44.91 BASAL CELL CARCINOMA OF SKIN, UNSPECIFIED: ICD-10-CM

## 2024-09-23 PROBLEM — C44.319: Status: ACTIVE | Noted: 2024-09-23

## 2024-09-23 PROCEDURE — 11622 EXC S/N/H/F/G MAL+MRG 1.1-2: CPT

## 2024-09-25 NOTE — PROCEDURE
[FreeTextEntry1] : This patient recently had a biopsy of a cutaneous lesion on her left forehead adjacent to her eyebrow.  This biopsy revealed basal cell carcinoma.  The patient denies any previous skin cancers.  She does have significant sun exposure.  On physical exam she has about an 8 or 9 mm scar from the previous biopsy.  There is no evidence of infection.  After informed consent was obtained the area is prepped and draped the usual fashion.  At least a 4 to 5 mm margin was marked around the entire previous biopsy site.  An ellipse of skin measuring approximately 2 x 3 cm was excised down into the subcutaneous fat including the biopsy site and some normal surrounding skin.  This was sent for pathology.  The skin was then closed with a running 5-0 nylon suture.  There was good hemostasis.  Sterile dressings were applied.  The patient tolerated the procedure well.

## 2024-09-25 NOTE — ASSESSMENT
[FreeTextEntry1] : Patient cell carcinoma left eyebrow excised.  Patient tolerated the procedure well.  The specimen was sent for pathology.  Will see the patient back in about a week for suture removal and wound check.  Will discuss the pathology when it becomes available.

## 2024-09-26 NOTE — ASU PREOP CHECKLIST - VIA
Orders:    CBC and differential; Future    Comprehensive metabolic panel; Future    
Followed by rheumatology on prednisone 5 mg daily       
In remission       
With a blood pressure controlled on current regimen       
recliner

## 2024-10-01 ENCOUNTER — APPOINTMENT (OUTPATIENT)
Dept: SURGERY | Facility: CLINIC | Age: 84
End: 2024-10-01
Payer: COMMERCIAL

## 2024-10-01 VITALS
SYSTOLIC BLOOD PRESSURE: 116 MMHG | WEIGHT: 170 LBS | TEMPERATURE: 97 F | OXYGEN SATURATION: 95 % | BODY MASS INDEX: 32.1 KG/M2 | HEIGHT: 61 IN | HEART RATE: 66 BPM | DIASTOLIC BLOOD PRESSURE: 74 MMHG

## 2024-10-01 DIAGNOSIS — C44.319 BASAL CELL CARCINOMA OF SKIN OF OTHER PARTS OF FACE: ICD-10-CM

## 2024-10-01 PROCEDURE — 99024 POSTOP FOLLOW-UP VISIT: CPT

## 2024-10-04 NOTE — REASON FOR VISIT
[Family Member] : family member [de-identified] : excision of BCC left eebrow [de-identified] : 9/23/24

## 2024-10-04 NOTE — HISTORY OF PRESENT ILLNESS
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

## 2024-10-04 NOTE — PHYSICAL EXAM
[Normal] : full range of motion and no deformities appreciated [de-identified] : left brow sutures C/D/I

## 2024-10-04 NOTE — ASSESSMENT
[FreeTextEntry1] : Jena is an 84 year old female who presented to the office on 9/23/24 for evaluation and management of left eyebrow biopsy proven basal cell carcinoma. 10/1/24 presents today for suture removal. Sutures removed without difficulty. Strei strips applied. Patient and daughter made aware path is pending. Plan-> FOLLOW UP IN ONE WEEK FOR WOUND CHECK AND TO REVIEW PATHOLOGY. Encouraged to call office with any questions or concerns.

## 2024-10-11 ENCOUNTER — APPOINTMENT (OUTPATIENT)
Dept: SURGERY | Facility: CLINIC | Age: 84
End: 2024-10-11

## 2024-10-11 PROCEDURE — 99212 OFFICE O/P EST SF 10 MIN: CPT

## 2024-10-24 ENCOUNTER — NON-APPOINTMENT (OUTPATIENT)
Age: 84
End: 2024-10-24

## 2024-10-25 ENCOUNTER — APPOINTMENT (OUTPATIENT)
Dept: SURGERY | Facility: CLINIC | Age: 84
End: 2024-10-25
Payer: COMMERCIAL

## 2024-10-25 VITALS
SYSTOLIC BLOOD PRESSURE: 100 MMHG | HEART RATE: 56 BPM | WEIGHT: 170 LBS | BODY MASS INDEX: 32.1 KG/M2 | HEIGHT: 61 IN | DIASTOLIC BLOOD PRESSURE: 80 MMHG | OXYGEN SATURATION: 97 %

## 2024-10-25 VITALS — OXYGEN SATURATION: 97 % | HEART RATE: 53 BPM | DIASTOLIC BLOOD PRESSURE: 80 MMHG | SYSTOLIC BLOOD PRESSURE: 124 MMHG

## 2024-10-25 PROCEDURE — 11622 EXC S/N/H/F/G MAL+MRG 1.1-2: CPT

## 2024-11-04 ENCOUNTER — APPOINTMENT (OUTPATIENT)
Dept: SURGERY | Facility: CLINIC | Age: 84
End: 2024-11-04
Payer: COMMERCIAL

## 2024-11-04 VITALS
HEART RATE: 67 BPM | DIASTOLIC BLOOD PRESSURE: 86 MMHG | TEMPERATURE: 97 F | OXYGEN SATURATION: 96 % | HEIGHT: 61 IN | WEIGHT: 170 LBS | SYSTOLIC BLOOD PRESSURE: 130 MMHG | BODY MASS INDEX: 32.1 KG/M2

## 2024-11-04 PROCEDURE — 99024 POSTOP FOLLOW-UP VISIT: CPT

## 2024-11-21 ENCOUNTER — NON-APPOINTMENT (OUTPATIENT)
Age: 84
End: 2024-11-21

## 2024-11-26 ENCOUNTER — APPOINTMENT (OUTPATIENT)
Dept: CARDIOLOGY | Facility: CLINIC | Age: 84
End: 2024-11-26
Payer: COMMERCIAL

## 2024-11-26 ENCOUNTER — NON-APPOINTMENT (OUTPATIENT)
Age: 84
End: 2024-11-26

## 2024-11-26 VITALS
SYSTOLIC BLOOD PRESSURE: 108 MMHG | BODY MASS INDEX: 32.1 KG/M2 | HEIGHT: 61 IN | WEIGHT: 170 LBS | DIASTOLIC BLOOD PRESSURE: 68 MMHG

## 2024-11-26 DIAGNOSIS — Z95.1 PRESENCE OF AORTOCORONARY BYPASS GRAFT: ICD-10-CM

## 2024-11-26 DIAGNOSIS — I10 ESSENTIAL (PRIMARY) HYPERTENSION: ICD-10-CM

## 2024-11-26 DIAGNOSIS — I25.10 ATHEROSCLEROTIC HEART DISEASE OF NATIVE CORONARY ARTERY W/OUT ANGINA PECTORIS: ICD-10-CM

## 2024-11-26 DIAGNOSIS — Z95.2 PRESENCE OF PROSTHETIC HEART VALVE: ICD-10-CM

## 2024-11-26 DIAGNOSIS — E78.2 MIXED HYPERLIPIDEMIA: ICD-10-CM

## 2024-11-26 PROCEDURE — G2211 COMPLEX E/M VISIT ADD ON: CPT

## 2024-11-26 PROCEDURE — 93000 ELECTROCARDIOGRAM COMPLETE: CPT

## 2024-11-26 PROCEDURE — 99214 OFFICE O/P EST MOD 30 MIN: CPT

## 2024-11-26 RX ORDER — ROSUVASTATIN CALCIUM 10 MG/1
10 TABLET, FILM COATED ORAL
Qty: 90 | Refills: 0 | Status: ACTIVE | COMMUNITY
Start: 2024-11-26 | End: 1900-01-01

## 2024-11-26 RX ORDER — BRIMONIDINE TARTRATE, TIMOLOL MALEATE 2; 5 MG/ML; MG/ML
0.2-0.5 SOLUTION/ DROPS OPHTHALMIC
Refills: 0 | Status: ACTIVE | COMMUNITY

## 2025-02-21 ENCOUNTER — RX RENEWAL (OUTPATIENT)
Age: 85
End: 2025-02-21

## 2025-02-21 NOTE — PHYSICAL EXAM
Attempted to contact patient. Left message to call office.    [de-identified] : Left Hip\par Inspection: No effusion\par Wounds: Healed incision about the left hip, no drainage or erythema\par Alignment: Normal\par Stability: No instability\par Palpation: No specific tenderness on palpation\par ROM: Flexion to 60 degrees, 30 degrees external rotation, 30 degrees abduction. \par Muscle Test: 5/5 All motor groups\par Leg examination: Calf is soft and non-tender.\par \par Right hip\par Inspection: No swelling or ecchymosis.\par Wounds: none.\par Palpation: non-tender.\par Stability: no instability.\par Strength: 5/5 all motor groups.\par ROM: Flexion to 60 degrees, 30 degrees external rotation, 30 degrees abduction. Groin pain. \par Leg length: equal. [de-identified] : Imaging done today, AP pelvis and lateral of both hips shows maintained superiolateral joint space with loss of the medial joint space of the right hip. 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

## 2025-02-27 ENCOUNTER — APPOINTMENT (OUTPATIENT)
Dept: CARDIOLOGY | Facility: CLINIC | Age: 85
End: 2025-02-27
Payer: COMMERCIAL

## 2025-02-27 VITALS
HEART RATE: 54 BPM | DIASTOLIC BLOOD PRESSURE: 70 MMHG | WEIGHT: 170 LBS | BODY MASS INDEX: 32.1 KG/M2 | HEIGHT: 61 IN | SYSTOLIC BLOOD PRESSURE: 126 MMHG

## 2025-02-27 DIAGNOSIS — E78.2 MIXED HYPERLIPIDEMIA: ICD-10-CM

## 2025-02-27 DIAGNOSIS — Z95.1 PRESENCE OF AORTOCORONARY BYPASS GRAFT: ICD-10-CM

## 2025-02-27 DIAGNOSIS — I25.10 ATHEROSCLEROTIC HEART DISEASE OF NATIVE CORONARY ARTERY W/OUT ANGINA PECTORIS: ICD-10-CM

## 2025-02-27 DIAGNOSIS — I10 ESSENTIAL (PRIMARY) HYPERTENSION: ICD-10-CM

## 2025-02-27 DIAGNOSIS — Z95.2 PRESENCE OF PROSTHETIC HEART VALVE: ICD-10-CM

## 2025-02-27 PROCEDURE — 99214 OFFICE O/P EST MOD 30 MIN: CPT

## 2025-02-27 PROCEDURE — G2211 COMPLEX E/M VISIT ADD ON: CPT

## 2025-02-27 PROCEDURE — 93000 ELECTROCARDIOGRAM COMPLETE: CPT

## 2025-02-27 RX ORDER — EVOLOCUMAB 140 MG/ML
140 INJECTION, SOLUTION SUBCUTANEOUS
Qty: 6 | Refills: 1 | Status: ACTIVE | COMMUNITY
Start: 2025-02-27 | End: 1900-01-01

## 2025-05-20 ENCOUNTER — APPOINTMENT (OUTPATIENT)
Dept: CARDIOLOGY | Facility: CLINIC | Age: 85
End: 2025-05-20
Payer: COMMERCIAL

## 2025-05-20 VITALS
HEART RATE: 56 BPM | WEIGHT: 170 LBS | SYSTOLIC BLOOD PRESSURE: 120 MMHG | DIASTOLIC BLOOD PRESSURE: 70 MMHG | BODY MASS INDEX: 32.1 KG/M2 | HEIGHT: 61 IN

## 2025-05-20 DIAGNOSIS — I25.10 ATHEROSCLEROTIC HEART DISEASE OF NATIVE CORONARY ARTERY W/OUT ANGINA PECTORIS: ICD-10-CM

## 2025-05-20 DIAGNOSIS — I10 ESSENTIAL (PRIMARY) HYPERTENSION: ICD-10-CM

## 2025-05-20 DIAGNOSIS — Z95.1 PRESENCE OF AORTOCORONARY BYPASS GRAFT: ICD-10-CM

## 2025-05-20 DIAGNOSIS — E78.2 MIXED HYPERLIPIDEMIA: ICD-10-CM

## 2025-05-20 DIAGNOSIS — Z95.2 PRESENCE OF PROSTHETIC HEART VALVE: ICD-10-CM

## 2025-05-20 DIAGNOSIS — E03.9 HYPOTHYROIDISM, UNSPECIFIED: ICD-10-CM

## 2025-05-20 PROCEDURE — 99214 OFFICE O/P EST MOD 30 MIN: CPT

## 2025-05-20 PROCEDURE — 93000 ELECTROCARDIOGRAM COMPLETE: CPT

## 2025-05-20 PROCEDURE — G2211 COMPLEX E/M VISIT ADD ON: CPT

## 2025-05-28 LAB — TSH SERPL-ACNC: 1.95 UIU/ML

## 2025-05-29 LAB
24R-OH-CALCIDIOL SERPL-MCNC: 32.9 PG/ML
VIT B12 SERPL-MCNC: 373 PG/ML

## 2025-06-05 ENCOUNTER — APPOINTMENT (OUTPATIENT)
Dept: ENDOCRINOLOGY | Facility: CLINIC | Age: 85
End: 2025-06-05
Payer: COMMERCIAL

## 2025-06-05 ENCOUNTER — NON-APPOINTMENT (OUTPATIENT)
Age: 85
End: 2025-06-05

## 2025-06-05 VITALS
HEIGHT: 61 IN | HEART RATE: 71 BPM | DIASTOLIC BLOOD PRESSURE: 60 MMHG | SYSTOLIC BLOOD PRESSURE: 120 MMHG | OXYGEN SATURATION: 97 % | WEIGHT: 170 LBS | BODY MASS INDEX: 32.1 KG/M2

## 2025-06-05 DIAGNOSIS — Q37.9 OTHER SPECIFIED CONGENITAL MALFORMATION SYNDROMES, NOT ELSEWHERE CLASSIFIED: ICD-10-CM

## 2025-06-05 DIAGNOSIS — Z81.8 FAMILY HISTORY OF OTHER MENTAL AND BEHAVIORAL DISORDERS: ICD-10-CM

## 2025-06-05 DIAGNOSIS — H90.5 OTHER SPECIFIED CONGENITAL MALFORMATION SYNDROMES, NOT ELSEWHERE CLASSIFIED: ICD-10-CM

## 2025-06-05 DIAGNOSIS — Z77.22 CONTACT WITH AND (SUSPECTED) EXPOSURE TO ENVIRONMENTAL TOBACCO SMOKE (ACUTE) (CHRONIC): ICD-10-CM

## 2025-06-05 DIAGNOSIS — Q24.9 OTHER SPECIFIED CONGENITAL MALFORMATION SYNDROMES, NOT ELSEWHERE CLASSIFIED: ICD-10-CM

## 2025-06-05 DIAGNOSIS — Z83.511 FAMILY HISTORY OF GLAUCOMA: ICD-10-CM

## 2025-06-05 DIAGNOSIS — Z82.49 FAMILY HISTORY OF ISCHEMIC HEART DISEASE AND OTHER DISEASES OF THE CIRCULATORY SYSTEM: ICD-10-CM

## 2025-06-05 DIAGNOSIS — Z82.3 FAMILY HISTORY OF STROKE: ICD-10-CM

## 2025-06-05 DIAGNOSIS — H54.8 LEGAL BLINDNESS, AS DEFINED IN USA: ICD-10-CM

## 2025-06-05 DIAGNOSIS — Z83.3 FAMILY HISTORY OF DIABETES MELLITUS: ICD-10-CM

## 2025-06-05 DIAGNOSIS — Q87.89 OTHER SPECIFIED CONGENITAL MALFORMATION SYNDROMES, NOT ELSEWHERE CLASSIFIED: ICD-10-CM

## 2025-06-05 DIAGNOSIS — Z78.9 OTHER SPECIFIED HEALTH STATUS: ICD-10-CM

## 2025-06-05 PROCEDURE — 99203 OFFICE O/P NEW LOW 30 MIN: CPT

## 2025-06-05 RX ORDER — LEVOTHYROXINE SODIUM 0.11 MG/1
112 TABLET ORAL
Qty: 90 | Refills: 1 | Status: ACTIVE | COMMUNITY
Start: 2025-06-05 | End: 1900-01-01

## 2025-07-02 ENCOUNTER — APPOINTMENT (OUTPATIENT)
Dept: PULMONOLOGY | Facility: CLINIC | Age: 85
End: 2025-07-02
Payer: COMMERCIAL

## 2025-07-02 VITALS
OXYGEN SATURATION: 96 % | SYSTOLIC BLOOD PRESSURE: 106 MMHG | WEIGHT: 170 LBS | HEIGHT: 61 IN | DIASTOLIC BLOOD PRESSURE: 82 MMHG | HEART RATE: 85 BPM | BODY MASS INDEX: 32.1 KG/M2

## 2025-07-02 PROBLEM — E66.9 OBESITY (BMI 30-39.9): Status: ACTIVE | Noted: 2025-07-02

## 2025-07-02 PROBLEM — G47.33 OBSTRUCTIVE SLEEP APNEA, ADULT: Status: ACTIVE | Noted: 2025-07-02

## 2025-07-02 PROCEDURE — 99214 OFFICE O/P EST MOD 30 MIN: CPT

## 2025-07-02 PROCEDURE — G2211 COMPLEX E/M VISIT ADD ON: CPT

## 2025-07-22 ENCOUNTER — RX RENEWAL (OUTPATIENT)
Age: 85
End: 2025-07-22

## 2025-07-23 ENCOUNTER — NON-APPOINTMENT (OUTPATIENT)
Age: 85
End: 2025-07-23

## 2025-08-07 ENCOUNTER — APPOINTMENT (OUTPATIENT)
Dept: CARDIOLOGY | Facility: CLINIC | Age: 85
End: 2025-08-07
Payer: COMMERCIAL

## 2025-08-07 VITALS
BODY MASS INDEX: 32.1 KG/M2 | DIASTOLIC BLOOD PRESSURE: 70 MMHG | SYSTOLIC BLOOD PRESSURE: 115 MMHG | WEIGHT: 170 LBS | HEART RATE: 54 BPM | HEIGHT: 61 IN

## 2025-08-07 DIAGNOSIS — I25.10 ATHEROSCLEROTIC HEART DISEASE OF NATIVE CORONARY ARTERY W/OUT ANGINA PECTORIS: ICD-10-CM

## 2025-08-07 DIAGNOSIS — I10 ESSENTIAL (PRIMARY) HYPERTENSION: ICD-10-CM

## 2025-08-07 DIAGNOSIS — Z95.2 PRESENCE OF PROSTHETIC HEART VALVE: ICD-10-CM

## 2025-08-07 DIAGNOSIS — Z95.1 PRESENCE OF AORTOCORONARY BYPASS GRAFT: ICD-10-CM

## 2025-08-07 DIAGNOSIS — E78.2 MIXED HYPERLIPIDEMIA: ICD-10-CM

## 2025-08-07 PROCEDURE — 99214 OFFICE O/P EST MOD 30 MIN: CPT

## 2025-08-07 PROCEDURE — G2211 COMPLEX E/M VISIT ADD ON: CPT

## 2025-08-07 PROCEDURE — 93000 ELECTROCARDIOGRAM COMPLETE: CPT

## 2025-08-22 ENCOUNTER — NON-APPOINTMENT (OUTPATIENT)
Age: 85
End: 2025-08-22

## 2025-09-16 ENCOUNTER — APPOINTMENT (OUTPATIENT)
Dept: ENDOCRINOLOGY | Facility: CLINIC | Age: 85
End: 2025-09-16